# Patient Record
Sex: FEMALE | Race: BLACK OR AFRICAN AMERICAN | NOT HISPANIC OR LATINO | URBAN - METROPOLITAN AREA
[De-identification: names, ages, dates, MRNs, and addresses within clinical notes are randomized per-mention and may not be internally consistent; named-entity substitution may affect disease eponyms.]

---

## 2018-03-06 LAB
BUN SERPL-MCNC: 14 MG/DL (ref 8–27)
BUN/CREAT SERPL: 17 (ref 12–28)
CALCIUM SERPL-MCNC: 10 MG/DL (ref 8.7–10.3)
CHLORIDE SERPL-SCNC: 99 MMOL/L (ref 96–106)
CO2 SERPL-SCNC: 26 MMOL/L (ref 18–29)
CREAT SERPL-MCNC: 0.82 MG/DL (ref 0.57–1)
GFR SERPLBLD CREATININE-BSD FMLA CKD-EPI: 75 ML/MIN/1.73
GFR SERPLBLD CREATININE-BSD FMLA CKD-EPI: 86 ML/MIN/1.73
GLUCOSE SERPL-MCNC: 103 MG/DL (ref 65–99)
HBA1C MFR BLD: 6.8 % (ref 4.8–5.6)
LAB CORP INTERPRETATION:: ABNORMAL
MICROALBUMIN UR-MCNC: <3 UG/ML
POTASSIUM SERPL-SCNC: 4.7 MMOL/L (ref 3.5–5.2)
SODIUM SERPL-SCNC: 141 MMOL/L (ref 134–144)

## 2018-03-09 RX ORDER — LISINOPRIL AND HYDROCHLOROTHIAZIDE 10; 12.5 MG/1; MG/1
1 TABLET ORAL DAILY
COMMUNITY
Start: 2017-06-15 | End: 2018-03-13 | Stop reason: SDUPTHER

## 2018-03-09 RX ORDER — METFORMIN HYDROCHLORIDE 500 MG/1
1 TABLET ORAL 2 TIMES DAILY
COMMUNITY
Start: 2017-06-15 | End: 2018-03-13 | Stop reason: SDUPTHER

## 2018-03-09 RX ORDER — ROSUVASTATIN CALCIUM 20 MG/1
1 TABLET, COATED ORAL DAILY
COMMUNITY
Start: 2017-06-15 | End: 2018-03-13 | Stop reason: SDUPTHER

## 2018-03-09 RX ORDER — OMEPRAZOLE 40 MG/1
1 CAPSULE, DELAYED RELEASE ORAL DAILY
COMMUNITY
Start: 2017-08-07 | End: 2018-03-13 | Stop reason: SDUPTHER

## 2018-03-09 RX ORDER — ASPIRIN 81 MG/1
1 TABLET ORAL DAILY
Status: ON HOLD | COMMUNITY
Start: 2013-02-13 | End: 2024-10-11

## 2018-03-13 ENCOUNTER — OFFICE VISIT (OUTPATIENT)
Dept: FAMILY MEDICINE | Facility: CLINIC | Age: 68
End: 2018-03-13
Attending: FAMILY MEDICINE
Payer: COMMERCIAL

## 2018-03-13 VITALS
SYSTOLIC BLOOD PRESSURE: 126 MMHG | HEIGHT: 61 IN | OXYGEN SATURATION: 97 % | BODY MASS INDEX: 39.04 KG/M2 | DIASTOLIC BLOOD PRESSURE: 72 MMHG | HEART RATE: 73 BPM | WEIGHT: 206.8 LBS | TEMPERATURE: 98.2 F

## 2018-03-13 DIAGNOSIS — E11.9 TYPE 2 DIABETES MELLITUS WITHOUT COMPLICATION, WITHOUT LONG-TERM CURRENT USE OF INSULIN (CMS/HCC): ICD-10-CM

## 2018-03-13 DIAGNOSIS — G89.29 CHRONIC PAIN OF LEFT ANKLE: ICD-10-CM

## 2018-03-13 DIAGNOSIS — I10 BENIGN ESSENTIAL HYPERTENSION: Primary | ICD-10-CM

## 2018-03-13 DIAGNOSIS — E66.9 OBESITY (BMI 35.0-39.9 WITHOUT COMORBIDITY): ICD-10-CM

## 2018-03-13 DIAGNOSIS — Z11.59 ENCOUNTER FOR HEPATITIS C SCREENING TEST FOR LOW RISK PATIENT: ICD-10-CM

## 2018-03-13 DIAGNOSIS — M25.572 CHRONIC PAIN OF LEFT ANKLE: ICD-10-CM

## 2018-03-13 DIAGNOSIS — E78.2 MULTIPLE-TYPE HYPERLIPIDEMIA: ICD-10-CM

## 2018-03-13 PROCEDURE — 99214 OFFICE O/P EST MOD 30 MIN: CPT | Performed by: FAMILY MEDICINE

## 2018-03-13 RX ORDER — OMEPRAZOLE 40 MG/1
40 CAPSULE, DELAYED RELEASE ORAL DAILY
Qty: 90 CAPSULE | Refills: 1 | Status: SHIPPED | OUTPATIENT
Start: 2018-03-13 | End: 2018-05-09 | Stop reason: SDUPTHER

## 2018-03-13 RX ORDER — METFORMIN HYDROCHLORIDE 500 MG/1
500 TABLET ORAL 2 TIMES DAILY
Qty: 180 TABLET | Refills: 1 | Status: SHIPPED | OUTPATIENT
Start: 2018-03-13 | End: 2018-06-11 | Stop reason: HOSPADM

## 2018-03-13 RX ORDER — LISINOPRIL AND HYDROCHLOROTHIAZIDE 10; 12.5 MG/1; MG/1
1 TABLET ORAL DAILY
Qty: 90 TABLET | Refills: 1 | Status: SHIPPED | OUTPATIENT
Start: 2018-03-13 | End: 2018-09-02 | Stop reason: SDUPTHER

## 2018-03-13 RX ORDER — ROSUVASTATIN CALCIUM 20 MG/1
20 TABLET, COATED ORAL DAILY
Qty: 90 TABLET | Refills: 1 | Status: SHIPPED | OUTPATIENT
Start: 2018-03-13 | End: 2020-03-22

## 2018-03-13 ASSESSMENT — ENCOUNTER SYMPTOMS
CHOKING: 0
DIZZINESS: 0
CHEST TIGHTNESS: 0
CHILLS: 0
EYE PAIN: 0
WHEEZING: 0
ACTIVITY CHANGE: 0
SHORTNESS OF BREATH: 0
NUMBNESS: 0
FATIGUE: 0
NERVOUS/ANXIOUS: 0
APPETITE CHANGE: 0
TROUBLE SWALLOWING: 0
ARTHRALGIAS: 0
EYE ITCHING: 0
RHINORRHEA: 0
FEVER: 0
COUGH: 0
EYE DISCHARGE: 0
DIARRHEA: 0
SINUS PAIN: 0
PALPITATIONS: 0
CONSTIPATION: 0
NAUSEA: 0
FREQUENCY: 0
APNEA: 0
BLOOD IN STOOL: 0
ABDOMINAL DISTENTION: 0
BACK PAIN: 0
SORE THROAT: 0
JOINT SWELLING: 1
DIFFICULTY URINATING: 0
VOMITING: 0
HEMATURIA: 0
ABDOMINAL PAIN: 0

## 2018-03-13 NOTE — PATIENT INSTRUCTIONS
Certain factors increase the risk of developing diabetes, including:    Weight. Being overweight is a primary risk factor for type 2 diabetes. The more fatty tissue you have, the more resistant your cells become to insulin.  Fat distribution. If your body stores fat primarily in your abdomen, your risk of type 2 diabetes is greater than if your body stores fat elsewhere, such as your hips and thighs.  Inactivity. The less active you are, the greater your risk of type 2 diabetes. Physical activity helps you control your weight, uses up glucose as energy and makes your cells more sensitive to insulin.  Family history. The risk of type 2 diabetes increases if your parent or sibling has type 2 diabetes.  Race. Although it's unclear why, people of certain races -- including blacks, Hispanics, American Indians and -Americans -- are more likely to develop type 2 diabetes than whites are.  Age. The risk of type 2 diabetes increases as you get older, especially after age 45. That's probably because people tend to exercise less, lose muscle mass and gain weight as they age. But type 2 diabetes is also increasing dramatically among children, adolescents and younger adults.  Prediabetes. Prediabetes is a condition in which your blood sugar level is higher than normal, but not high enough to be classified as diabetes. Left untreated, prediabetes often progresses to type 2 diabetes.  Gestational diabetes. If you developed gestational diabetes when you were pregnant, your risk of later developing type 2 diabetes increases. If you gave birth to a baby weighing more than 9 pounds (4 kilograms), you're also at risk of type 2 diabetes.  Although long-term complications of diabetes develop gradually, they can eventually be disabling or even life-threatening. Some of the potential complications of diabetes include:    Heart and blood vessel disease. Diabetes dramatically increases the risk of various cardiovascular problems,  including coronary artery disease with chest pain (angina), heart attack, stroke, narrowing of arteries (atherosclerosis) and high blood pressure. The risk of stroke is two to four times higher for people with diabetes, and the death rate from heart disease is two to four times higher for people with diabetes than for people without the disease, according to the American Heart Association.  Nerve damage (neuropathy). Excess sugar can injure the walls of the tiny blood vessels (capillaries) that nourish your nerves, especially in the legs. This can cause tingling, numbness, burning or pain that usually begins at the tips of the toes or fingers and gradually spreads upward. Poorly controlled blood sugar can eventually cause you to lose all sense of feeling in the affected limbs. Damage to the nerves that control digestion can cause problems with nausea, vomiting, diarrhea or constipation. For men, erectile dysfunction may be an issue.  Kidney damage (nephropathy). The kidneys contain millions of tiny blood vessel clusters that filter waste from your blood. Diabetes can damage this delicate filtering system. Severe damage can lead to kidney failure or irreversible end-stage kidney disease, requiring dialysis or a kidney transplant.  Eye damage. Diabetes can damage the blood vessels of the retina (diabetic retinopathy), potentially leading to blindness. Diabetes also increases the risk of other serious vision conditions, such as cataracts and glaucoma.  Foot damage. Nerve damage in the feet or poor blood flow to the feet increases the risk of various foot complications. Left untreated, cuts and blisters can become serious infections. Severe damage might require toe, foot or even leg amputation.  Skin and mouth conditions. Diabetes may leave you more susceptible to skin problems, including bacterial and fungal infections. Gum infections also may be a concern, especially if you have a history of poor dental  hygiene.  Osteoporosis. Diabetes may lead to lower than normal bone mineral density, increasing your risk of osteoporosis.  Alzheimer's disease. Type 2 diabetes may increase the risk of Alzheimer's disease and vascular dementia. The poorer your blood sugar control, the greater the risk appears to be. So what connects the two conditions? One theory is that cardiovascular problems caused by diabetes could contribute to dementia by blocking blood flow to the brain or causing strokes. Other possibilities are that too much insulin in the blood leads to brain-damaging inflammation, or lack of insulin in the brain deprives brain cells of glucose.  Hearing problems. Diabetes can also lead to hearing impairment.  Consider these tips:    Commit to managing your diabetes. Learn all you can about type 2 diabetes. Make healthy eating and physical activity part of your daily routine. Establish a relationship with a diabetes educator, and ask your diabetes treatment team for help when you need it.  Identify yourself. Wear a tag or bracelet that says you have diabetes. Keep a glucagon kit nearby in case of a low blood sugar emergency -- and make sure your friends and loved ones know how to use it.  Schedule a yearly physical exam and regular eye exams. Your regular diabetes checkups aren't meant to replace yearly physicals or routine eye exams. During the physical, your doctor will look for any diabetes-related complications, as well as screen for other medical problems. Your eye care specialist will check for signs of retinal damage, cataracts and glaucoma.  Keep your immunizations up to date. High blood sugar can weaken your immune system. Get a flu shot every year, and get a tetanus booster shot every 10 years. Your doctor will likely recommend the pneumonia vaccine, as well. The Centers for Disease Control and Prevention (CDC) also currently recommends hepatitis B vaccination if you haven't previously been vaccinated against  hepatitis B and you're an adult ages 19 to 59 with type 1 or type 2 diabetes. The most recent CDC guidelines advise vaccination as soon as possible after diagnosis with type 1 or type 2 diabetes. If you are age 60 or older, have diabetes and haven't previously received the vaccine, talk to your doctor about whether it's right for you.  Take care of your teeth. Diabetes may leave you prone to gum infections. Brush your teeth at least twice a day, floss your teeth once a day, and schedule dental exams at least twice a year. Consult your dentist right away if your gums bleed or look red or swollen.  Pay attention to your feet. Wash your feet daily in lukewarm water. Dry them gently, especially between the toes, and moisturize with lotion. Check your feet every day for blisters, cuts, sores, redness or swelling. Consult your doctor if you have a sore or other foot problem that isn't healing.  Keep your blood pressure and cholesterol under control. Eating healthy foods and exercising regularly can go a long way toward controlling high blood pressure and cholesterol. Medication may be needed, too.  If you smoke or use other types of tobacco, ask your doctor to help you quit. Smoking increases your risk of various diabetes complications, including heart attack, stroke, nerve damage and kidney disease. Talk to your doctor about ways to stop smoking or to stop using other types of tobacco.  If you drink alcohol, do so responsibly. Alcohol, as well as drink mixers, can cause either high or low blood sugar, depending on how much you drink and if you eat at the same time. If you choose to drink, do so only in moderation and always with a meal. The recommendation for women is no more than one drink daily and no more than two drinks daily for men.  Take stress seriously. If you're stressed, it's easy to abandon your usual diabetes management routine. The hormones your body may produce in response to prolonged stress may prevent  insulin from working properly, which only makes matters worse. To take control, set limits. Prioritize your tasks. Learn relaxation techniques. Get plenty of sleep.    People with a BMI greater than 30 are at higher risk of developing many conditions including hypertension, diabetes, cardiovascular disease, including heart attack and stroke, some cancers, including breast and colon, sleep apnea, arthritis, Alzheimer's disease, and breathing disorders.  Obesity is associated with higher morbidity or disease severity.   With each 5 point reduction in your BMI your risk of sudden death drops by nearly 30 percent.  To maximize success, employ a weight loss program consisting of caloric restriction and regular aerobic exercise. Each alone will result in a caloric loss, but when combined, the caloric burning effect is more dramatic.  A reasonable goal is to lose one pound per week.  Obesity is a chronic disease that will continue for a lifetime. It will not be effectively treated with fad diets, starvation diets, or liquid diets. Do not go on a diet.   As we get older our caloric requirements decrease. Older people don't need as much food as younger people. Our caloric requirements peak at about age 25 to 30 and then decline. A very crude estimate is about 200 calories every decade after thirty.   No fast food:  You can not lose weight eating fast food.  In the September 2007 issue of Men's Health, commentary was made that you need to run the length of a football field 109 times to burn off the calories in the [employer] 10 piece chicken select premium breast strips with spicy buffalo sauce.  No soda, fruit juices, or sweetened beverages: instead drink diet beverages in limited amounts or water. One of the greatest enemies to the obese individual is soda. Even no calorie beverages with artificial sweeteners such as diet soda can increase our cravings for more sweets so be careful. My advice, don't drink your  calories.  Alcoholic drinks are loaded with calories. Most people also tend to ignore the calories their getting from their nightly cocktail. You can ignore them if you want but they're adding up with every sip.  Eat healthier foods such as vegetables, fruit, whole grains, legumes, and nuts.   Portion control: simply put, eat less. Calorie restriction is the most essential ingredient to any weight loss strategy. Try using smaller sized dinner plates. Research has shown that people using smaller plates will consume less food. Visually, the plate appears filled with food and this appeases your desire to eat.  Eat slowly. Those that eat to fast don't allow enough time for the brain to get the 'I'm getting full signal.'   Eat the amount of food you need, not the amount you want. Food for today is only meant to sustain us until tomorrow, nothing more.  Remove yourself from high risk eating situations. For example, if at a party, do not hang around the food table or in the kitchen.  Avoid placing the food serving containers on the dinner table. This reduces the tendency to have seconds.  Meal planning is important as well. Too often, people fail to plan their meals in advance and end up going out or making poor dietary decisions. This is especially important for those who travel and eat out frequently.  Do not grocery shop when hungry. Everything you crave will end up in your cart. Instead, prepare your grocery list, and adhere to it at the store.  Shop the perimeter. The aisles of the grocery store are nothing but calorie trouble.   Pack your lunch instead of eating takeout or dining out. Not only will it be cheaper but, more importantly, healthier.  When dining out visit the restaurant's website prior to your visit and create a health meal. Then stick to it at ordering time. Do not get swayed by a tempting special.  Order a doggie bag with your meal at the time you place your order. When your meal arrives put anything  above and beyond a reasonable portion into the doggie bag for tomorrow’s lunch.  Do not prepare more food than you need. The less food you make, the less you can eat. There are times when you may want to prepare a little extra. For instance, if you pack your lunch you may want to prepare enough for dinner and lunch the following day. Now that is meal planning.   Make your lunch for the next day after dinner. It is always helpful to make your next day's meal when you are not hungry.  Drink water before meals. Having a glass of water prior to each meal will occupy space in your stomach that would otherwise have been filled with food.  Eat a salad with each meal and have the salad dressing put on the side. Salads have a lower caloric density and are healthy. Just go easy with that salad dressing. Salad dressings are loaded with calories - read the labels to see for yourself - and it's possible to get fat eating salads.      Replace desserts with healthy snacks like fruit or low fat yogurt.  Exercise regularly. Exercise burns calories and in combination with calorie restriction results in weight loss. Start with a few short walks throughout the day.     Track your progress by recording your results.  Assign a , whether it be a family member, friend, co-worker, , or a healthcare professional to monitor your progress. Keep in close contact with your  at predetermined intervals so you stay on course. The  should be kept current regarding your progress. Choose someone that will offer positive support and motivate you past time periods when you plateau.  Reward yourself when short term goals are achieved. Just a small token for yourself that will inspire you to reach the next level. Avoid rewarding yourself with food. That could compromise your objective.  Do not get frustrated or give up. Too often people become disgusted with their progress and throw in the towel. Be mentally prepared for the  "times when progress is hard to come by. Prior to beginning, anticipate having periods of time where progress is slowed or stalled.  Then it will not come as such a shock when they occur and you will be able to persevere and continue.   Manage your stress related eating with walking, meditation, yoga or breathing techniques.    Forgive yourself if you slip up. You are only human and are bound to make mistakes. When you do make mistakes, you need to quickly get back on track without beating yourself up. However, do not use the \"I am only human\" excuse to intentionally deviate from your plan. You have to be honest with yourself.  Remember you are in control of your eating habits and no one else.  Go public. Don't secretly try to lose weight concerned about the embarrassment of failure. One, that's self defeating. And two, you can't get any outside help. Instead, announce to everyone you know your intentions to lose weight and ask for their assistance. Get them on your side.   Follow the Rule of the 3 P's: Be positive, persistent, and patient.  Find what motivates you to lose weight. Whether you want to fit into new clothes, look sexier, or how about live a longer healthier life that can be spent doing the things you enjoy, when you want to do them, with the people you love doing them with. Focus on your motivating reasons to lose weight. Write them down and keep them with you.    "

## 2018-03-13 NOTE — PROGRESS NOTES
"Subjective       Patient ID: Fanny Reddy is a 67 y.o. female.    HPI   vision in right eye is not to 100 percent. peripheral vision is intact. No sensitivity to light. Pt notes gets headaches and red eyes if blood pressure is elevated. Have not experienced for 10 years. Recent fall in December 2017. Was seen at [facility] for two ligamentous sprains in left ankles. Denies hitting head. Ankle swells occasionally and is seeing Dr. Chan at [facility] as needed. Pain is controlled in ankle, but still experiencing some pain, tightness, and reduced ROM in Left ankle. Completing exercise at home.    Not compliant with checking blood glucose daily. She is aware that she needs to monitor it daily.     Psx:  No past surgical history on file.  macular pucker on retina in the right eye (November 29, 2017- [facility])    The following have been reviewed and updated as appropriate in this visit:  Allergies  Meds       Review of Systems   Constitutional: Negative for chills, fatigue and fever.   HENT: Negative for congestion, ear discharge, rhinorrhea, sinus pain and sore throat.    Eyes: Negative for pain, discharge and itching.   Respiratory: Negative for apnea, choking, chest tightness and wheezing.    Cardiovascular: Negative for chest pain, palpitations and leg swelling.   Gastrointestinal: Negative for abdominal distention, abdominal pain, blood in stool, constipation, diarrhea, nausea and vomiting.   Genitourinary: Negative for difficulty urinating.   Skin: Negative for rash.   Allergic/Immunologic: Negative for environmental allergies and food allergies.   Neurological: Negative for dizziness and syncope.       Objective     Vitals:    03/13/18 0814   BP: 126/72   Pulse: 73   Temp: 36.8 °C (98.2 °F)   TempSrc: Oral   SpO2: 97%   Weight: 93.8 kg (206 lb 12.8 oz)   Height: 1.549 m (5' 1\")     Body mass index is 39.07 kg/m².    Physical Exam    Assessment/Plan   Problem List Items Addressed " This Visit     Benign essential hypertension - Primary     Stable, cont current regimen         Relevant Medications    lisinopril-hydrochlorothiazide (PRINZIDE,ZESTORETIC) 10-12.5 mg per tablet    Other Relevant Orders    CBC and differential    Comprehensive metabolic panel    Hemoglobin A1c    Lipid panel    Type 2 diabetes mellitus (CMS/HCC) (HCC)    Relevant Medications    metFORMIN (GLUCOPHAGE) 500 mg tablet    Other Relevant Orders    CBC and differential    Comprehensive metabolic panel    Hemoglobin A1c    Lipid panel    Multiple-type hyperlipidemia     Cont statin, repeat lipids prior to next visit         Relevant Medications    rosuvastatin (CRESTOR) 20 mg tablet    Other Relevant Orders    CBC and differential    Comprehensive metabolic panel    Hemoglobin A1c    Lipid panel    Obesity (BMI 35.0-39.9 without comorbidity)     Continue good lifestyle modifications  Pt has successfully lost 6 lbs since last visit.         Relevant Orders    CBC and differential    Comprehensive metabolic panel    Hemoglobin A1c    Lipid panel      Other Visit Diagnoses     Encounter for hepatitis C screening test for low risk patient        Relevant Orders    Hepatitis C Viral RNA, Genotype, LiPA®    Chronic pain of left ankle        managed and followed by dr. weeks - cont physical therapy

## 2018-03-13 NOTE — PROGRESS NOTES
PHI: vision in right eye is not to 100 percent. peripheral vision is intact. No sensitivity to light. Pt notes gets headaches and red eyes if blood pressure is elevated. Have not experienced for 10 years. Recent fall in December 2017. Was seen at [facility] for two ligamentous sprains in left ankles. Denies hitting head. Ankle swells occasionally and is seeing Dr. Chan at [facility] as needed. Pain is controlled in ankle, but still experiencing some pain, tightness, and reduced ROM in Left ankle. Completing exercise at home.    Not compliant with checking blood glucose daily. She is aware that she needs to monitor it daily.     Psx: macular pucker on retina in the right eye (November 29, 2017- [facility])

## 2018-03-13 NOTE — PROGRESS NOTES
"Subjective      Patient ID: Fanny Reddy is a 67 y.o. female.    Cc: chronic conditions  dm2 - stable  htn - stable  hld - stable  Obesity - pt has successfully lost 6 lbs since last visit. Niece does most of the cooking at home and it is more healthy food.    Left ankle - sprain in December. Managed by dr. Chan. Currently doing self physical therapy. Still limping when walking and slowly improving rom      Social History     Social History   • Marital status: Unknown     Spouse name: N/A   • Number of children: N/A   • Years of education: N/A     Occupational History   • Not on file.     Social History Main Topics   • Smoking status: Never Smoker   • Smokeless tobacco: Never Used   • Alcohol use Yes      Comment: socially at celebratory events   • Drug use: No   • Sexual activity: No     Other Topics Concern   • Not on file     Social History Narrative   • No narrative on file         The following have been reviewed and updated as appropriate in this visit:  Allergies  Meds       Review of Systems   Constitutional: Negative for activity change, appetite change, fatigue and fever.   HENT: Negative for congestion, ear pain, postnasal drip, sore throat and trouble swallowing.    Eyes: Negative for pain.   Respiratory: Negative for cough, shortness of breath and wheezing.    Cardiovascular: Negative for chest pain.   Gastrointestinal: Negative for abdominal pain, constipation, diarrhea, nausea and vomiting.   Genitourinary: Negative for frequency and hematuria.   Musculoskeletal: Positive for joint swelling. Negative for arthralgias and back pain.   Skin: Negative for rash.   Neurological: Negative for dizziness and numbness.   Psychiatric/Behavioral: The patient is not nervous/anxious.        Objective     Vitals:    03/13/18 0814   BP: 126/72   Pulse: 73   Temp: 36.8 °C (98.2 °F)   TempSrc: Oral   SpO2: 97%   Weight: 93.8 kg (206 lb 12.8 oz)   Height: 1.549 m (5' 1\")     Body mass index is 39.07 " kg/m².    Physical Exam   Constitutional: She is oriented to person, place, and time. She appears well-developed and well-nourished.   HENT:   Head: Normocephalic and atraumatic.   Eyes: EOM are normal.   Neck: No thyromegaly present.   Cardiovascular: Normal rate, regular rhythm, normal heart sounds and intact distal pulses.    Pulmonary/Chest: Effort normal and breath sounds normal.   Neurological: She is alert and oriented to person, place, and time.   Skin: Skin is warm and dry.   Psychiatric: She has a normal mood and affect. Her behavior is normal.       Assessment/Plan   Problem List Items Addressed This Visit     Benign essential hypertension - Primary     Stable, cont current regimen         Relevant Medications    lisinopril-hydrochlorothiazide (PRINZIDE,ZESTORETIC) 10-12.5 mg per tablet    Other Relevant Orders    CBC and differential    Comprehensive metabolic panel    Hemoglobin A1c    Lipid panel    Type 2 diabetes mellitus (CMS/HCC) (HCC)    Relevant Medications    metFORMIN (GLUCOPHAGE) 500 mg tablet    Other Relevant Orders    CBC and differential    Comprehensive metabolic panel    Hemoglobin A1c    Lipid panel    Multiple-type hyperlipidemia     Cont statin, repeat lipids prior to next visit         Relevant Medications    rosuvastatin (CRESTOR) 20 mg tablet    Other Relevant Orders    CBC and differential    Comprehensive metabolic panel    Hemoglobin A1c    Lipid panel    Obesity (BMI 35.0-39.9 without comorbidity)     Continue good lifestyle modifications  Pt has successfully lost 6 lbs since last visit.         Relevant Orders    CBC and differential    Comprehensive metabolic panel    Hemoglobin A1c    Lipid panel      Other Visit Diagnoses     Encounter for hepatitis C screening test for low risk patient        Relevant Orders    Hepatitis C Viral RNA, Genotype, LiPA®    Chronic pain of left ankle        managed and followed by dr. weeks - cont physical therapy        Up to date on  mammograms, colonoscopy, and gyn. Up to date on all vaccinations including both pna, shingles, flu and tetanus

## 2018-05-09 RX ORDER — OMEPRAZOLE 40 MG/1
CAPSULE, DELAYED RELEASE ORAL
Qty: 90 CAPSULE | Refills: 2 | Status: SHIPPED | OUTPATIENT
Start: 2018-05-09 | End: 2018-05-10 | Stop reason: SDUPTHER

## 2018-05-10 RX ORDER — OMEPRAZOLE 40 MG/1
CAPSULE, DELAYED RELEASE ORAL
Qty: 90 CAPSULE | Refills: 1 | Status: SHIPPED | OUTPATIENT
Start: 2018-05-10 | End: 2019-10-04

## 2018-06-28 RX ORDER — METFORMIN HYDROCHLORIDE 500 MG/1
500 TABLET ORAL 2 TIMES DAILY WITH MEALS
Qty: 180 TABLET | Refills: 1 | Status: SHIPPED | OUTPATIENT
Start: 2018-06-28 | End: 2018-10-18 | Stop reason: SDUPTHER

## 2018-06-28 NOTE — TELEPHONE ENCOUNTER
From: Fanny Reddy  Sent: 6/28/2018 9:54 AM EDT  Subject: Medication Renewal Request    Fanny Reddy would like a refill of the following medications:     Other - Metformin    Preferred pharmacy: [id number] - [place] [id number]  Delivery method: Pickup  Preferred pick-up date and time: 6/28/2018 5:00 PM

## 2018-08-21 ENCOUNTER — TRANSCRIBE ORDERS (OUTPATIENT)
Dept: SCHEDULING | Age: 68
End: 2018-08-21

## 2018-08-21 DIAGNOSIS — Z12.31 ENCOUNTER FOR SCREENING MAMMOGRAM FOR MALIGNANT NEOPLASM OF BREAST: Primary | ICD-10-CM

## 2018-09-04 RX ORDER — LISINOPRIL AND HYDROCHLOROTHIAZIDE 10; 12.5 MG/1; MG/1
TABLET ORAL
Qty: 90 TABLET | Refills: 1 | Status: SHIPPED | OUTPATIENT
Start: 2018-09-04 | End: 2019-03-04 | Stop reason: SDUPTHER

## 2018-09-12 LAB
ALBUMIN SERPL-MCNC: 4.6 G/DL (ref 3.6–4.8)
ALBUMIN/GLOB SERPL: 2.2 {RATIO} (ref 1.2–2.2)
ALP SERPL-CCNC: 75 IU/L (ref 39–117)
ALT SERPL-CCNC: 32 IU/L (ref 0–32)
AST SERPL-CCNC: 23 IU/L (ref 0–40)
BASOPHILS # BLD AUTO: 0 X10E3/UL (ref 0–0.2)
BASOPHILS NFR BLD AUTO: 1 %
BILIRUB SERPL-MCNC: 0.4 MG/DL (ref 0–1.2)
BUN SERPL-MCNC: 14 MG/DL (ref 8–27)
BUN/CREAT SERPL: 16 (ref 12–28)
CALCIUM SERPL-MCNC: 9.8 MG/DL (ref 8.7–10.3)
CHLORIDE SERPL-SCNC: 101 MMOL/L (ref 96–106)
CHOLEST SERPL-MCNC: 220 MG/DL (ref 100–199)
CO2 SERPL-SCNC: 24 MMOL/L (ref 20–29)
CREAT SERPL-MCNC: 0.89 MG/DL (ref 0.57–1)
EOSINOPHIL # BLD AUTO: 0.1 X10E3/UL (ref 0–0.4)
EOSINOPHIL NFR BLD AUTO: 3 %
ERYTHROCYTE [DISTWIDTH] IN BLOOD BY AUTOMATED COUNT: 15.6 % (ref 12.3–15.4)
GLOBULIN SER CALC-MCNC: 2.1 G/DL (ref 1.5–4.5)
GLUCOSE SERPL-MCNC: 124 MG/DL (ref 65–99)
HBA1C MFR BLD: 6.9 % (ref 4.8–5.6)
HCT VFR BLD AUTO: 39.3 % (ref 34–46.6)
HDLC SERPL-MCNC: 59 MG/DL
HGB BLD-MCNC: 12.6 G/DL (ref 11.1–15.9)
IMM GRANULOCYTES # BLD: 0 X10E3/UL (ref 0–0.1)
IMM GRANULOCYTES NFR BLD: 1 %
LAB CORP EGFR IF AFRICN AM: 78 ML/MIN/1.73
LAB CORP EGFR IF NONAFRICN AM: 67 ML/MIN/1.73
LDLC SERPL CALC-MCNC: 139 MG/DL (ref 0–99)
LYMPHOCYTES # BLD AUTO: 1.5 X10E3/UL (ref 0.7–3.1)
LYMPHOCYTES NFR BLD AUTO: 35 %
MCH RBC QN AUTO: 25.6 PG (ref 26.6–33)
MCHC RBC AUTO-ENTMCNC: 32.1 G/DL (ref 31.5–35.7)
MCV RBC AUTO: 80 FL (ref 79–97)
MONOCYTES # BLD AUTO: 0.4 X10E3/UL (ref 0.1–0.9)
MONOCYTES NFR BLD AUTO: 9 %
NEUTROPHILS # BLD AUTO: 2.2 X10E3/UL (ref 1.4–7)
NEUTROPHILS NFR BLD AUTO: 51 %
PLATELET # BLD AUTO: 271 X10E3/UL (ref 150–379)
POTASSIUM SERPL-SCNC: 4.3 MMOL/L (ref 3.5–5.2)
PROT SERPL-MCNC: 6.7 G/DL (ref 6–8.5)
RBC # BLD AUTO: 4.92 X10E6/UL (ref 3.77–5.28)
SODIUM SERPL-SCNC: 140 MMOL/L (ref 134–144)
TRIGL SERPL-MCNC: 108 MG/DL (ref 0–149)
VLDLC SERPL CALC-MCNC: 22 MG/DL (ref 5–40)
WBC # BLD AUTO: 4.3 X10E3/UL (ref 3.4–10.8)

## 2018-09-15 LAB
HCV GENTYP SERPL NAA+PROBE: NORMAL
LAB CORP PLEASE NOTE:: NORMAL

## 2018-09-17 DIAGNOSIS — Z11.59 ENCOUNTER FOR HEPATITIS C SCREENING TEST FOR LOW RISK PATIENT: Primary | ICD-10-CM

## 2018-09-18 ENCOUNTER — OFFICE VISIT (OUTPATIENT)
Dept: FAMILY MEDICINE | Facility: CLINIC | Age: 68
End: 2018-09-18
Payer: COMMERCIAL

## 2018-09-18 VITALS
WEIGHT: 214.2 LBS | SYSTOLIC BLOOD PRESSURE: 126 MMHG | TEMPERATURE: 98.3 F | BODY MASS INDEX: 40.44 KG/M2 | HEART RATE: 73 BPM | DIASTOLIC BLOOD PRESSURE: 74 MMHG | HEIGHT: 61 IN | OXYGEN SATURATION: 97 %

## 2018-09-18 DIAGNOSIS — Z23 NEED FOR VACCINATION: ICD-10-CM

## 2018-09-18 DIAGNOSIS — I10 BENIGN ESSENTIAL HYPERTENSION: ICD-10-CM

## 2018-09-18 DIAGNOSIS — E66.01 OBESITY, CLASS III, BMI 40-49.9 (MORBID OBESITY) (CMS/HCC): ICD-10-CM

## 2018-09-18 DIAGNOSIS — E78.2 MULTIPLE-TYPE HYPERLIPIDEMIA: ICD-10-CM

## 2018-09-18 DIAGNOSIS — E11.9 TYPE 2 DIABETES MELLITUS WITHOUT COMPLICATION, WITHOUT LONG-TERM CURRENT USE OF INSULIN (CMS/HCC): Primary | ICD-10-CM

## 2018-09-18 PROBLEM — E66.813 OBESITY, CLASS III, BMI 40-49.9 (MORBID OBESITY): Status: ACTIVE | Noted: 2018-03-13

## 2018-09-18 LAB
HCV AB S/CO SERPL IA: <0.1 S/CO RATIO (ref 0–0.9)
LAB CORP COMMENT:: NORMAL

## 2018-09-18 PROCEDURE — 99214 OFFICE O/P EST MOD 30 MIN: CPT | Mod: 25 | Performed by: FAMILY MEDICINE

## 2018-09-18 PROCEDURE — 90653 IIV ADJUVANT VACCINE IM: CPT | Performed by: FAMILY MEDICINE

## 2018-09-18 PROCEDURE — 90471 IMMUNIZATION ADMIN: CPT | Performed by: FAMILY MEDICINE

## 2018-09-18 RX ORDER — ROSUVASTATIN CALCIUM 20 MG/1
20 TABLET, COATED ORAL DAILY
COMMUNITY
End: 2018-10-10 | Stop reason: SDUPTHER

## 2018-09-18 RX ORDER — DICLOFENAC SODIUM 10 MG/G
GEL TOPICAL 2 TIMES DAILY PRN
Qty: 100 G | Refills: 1 | Status: SHIPPED | OUTPATIENT
Start: 2018-09-18 | End: 2018-10-12 | Stop reason: SDUPTHER

## 2018-09-18 ASSESSMENT — ENCOUNTER SYMPTOMS
HEMATURIA: 0
BACK PAIN: 0
NUMBNESS: 0
SHORTNESS OF BREATH: 0
CONSTIPATION: 0
ACTIVITY CHANGE: 0
ARTHRALGIAS: 0
FEVER: 0
ABDOMINAL PAIN: 0
NERVOUS/ANXIOUS: 0
APPETITE CHANGE: 0
EYE PAIN: 0
SORE THROAT: 0
DIZZINESS: 0
VOMITING: 0
COUGH: 0
NAUSEA: 0
DIARRHEA: 0
FREQUENCY: 0
FATIGUE: 0
TROUBLE SWALLOWING: 0
WHEEZING: 0

## 2018-09-18 NOTE — PROGRESS NOTES
"Subjective      Patient ID: Fanny Reddy is a 68 y.o. female.    HPI      10/3/18 - has surgery for cataract with Dr. Hernandez  dm2 - no issues  Has seen podiatry this year  Knows she needs to lose weight and is interested in going to nutritional classes at [facility]    The following have been reviewed and updated as appropriate in this visit:       Review of Systems   Constitutional: Negative for activity change, appetite change, fatigue and fever.   HENT: Negative for congestion, ear pain, postnasal drip, sore throat and trouble swallowing.    Eyes: Negative for pain.   Respiratory: Negative for cough, shortness of breath and wheezing.    Cardiovascular: Negative for chest pain.   Gastrointestinal: Negative for abdominal pain, constipation, diarrhea, nausea and vomiting.   Genitourinary: Negative for frequency and hematuria.   Musculoskeletal: Negative for arthralgias and back pain.   Skin: Negative for rash.   Neurological: Negative for dizziness and numbness.   Psychiatric/Behavioral: The patient is not nervous/anxious.        Social History     Social History   • Marital status: Unknown     Spouse name: N/A   • Number of children: N/A   • Years of education: N/A     Occupational History   • Not on file.     Social History Main Topics   • Smoking status: Never Smoker   • Smokeless tobacco: Never Used   • Alcohol use Yes      Comment: socially at celebratory events   • Drug use: No   • Sexual activity: No     Other Topics Concern   • Not on file     Social History Narrative   • No narrative on file       Objective     Vitals:    09/18/18 0829   BP: 126/74   Pulse: 73   Temp: 36.8 °C (98.3 °F)   SpO2: 97%   Weight: 97.2 kg (214 lb 3.2 oz)   Height: 1.549 m (5' 1\")       Physical Exam   Constitutional: She is oriented to person, place, and time. She appears well-developed and well-nourished.   HENT:   Head: Normocephalic and atraumatic.   Neck: Normal range of motion. Neck supple. No thyromegaly present. "   Cardiovascular: Normal rate, regular rhythm, normal heart sounds and intact distal pulses.  Exam reveals no gallop and no friction rub.    No murmur heard.  Pulmonary/Chest: Effort normal and breath sounds normal. No respiratory distress. She has no wheezes. She has no rales.   Neurological: She is alert and oriented to person, place, and time.   Skin: Skin is warm and dry. No rash noted. No erythema.   Psychiatric: She has a normal mood and affect. Her behavior is normal.       Assessment/Plan   Problem List Items Addressed This Visit     Benign essential hypertension     Stable, cont current regimen  Counseled low salt/sodium diet and increased cardiovascular exercise           Type 2 diabetes mellitus (CMS/[facility]) ([facility]) - Primary     Controlled  a1c 6.9%  Cont current regimen  Pt has seen ophtho and podiatry this year  Cont acei and statin         Multiple-type hyperlipidemia     Cont statin         Relevant Medications    rosuvastatin (CRESTOR) 20 mg tablet    Obesity, Class III, BMI 40-49.9 (morbid obesity) (CMS/[facility]) ([facility])     Counseled diet/nutrition and exercise           Other Visit Diagnoses     Need for vaccination        Relevant Orders    Influenza vaccine 65 and older IM preservative free (Completed)          Gyn - Dr. Wade - went sept 2018 - follows annually  mammo scheduled 9/24/18  Flu shot administered today

## 2018-09-18 NOTE — ASSESSMENT & PLAN NOTE
Stable, cont current regimen  Counseled low salt/sodium diet and increased cardiovascular exercise

## 2018-09-18 NOTE — ASSESSMENT & PLAN NOTE
Controlled  a1c 6.9%  Cont current regimen  Pt has seen ophtho and podiatry this year  Cont acei and statin

## 2018-09-24 ENCOUNTER — HOSPITAL ENCOUNTER (OUTPATIENT)
Dept: RADIOLOGY | Facility: HOSPITAL | Age: 68
Discharge: HOME | End: 2018-09-24
Attending: PHYSICIAN ASSISTANT
Payer: COMMERCIAL

## 2018-09-24 DIAGNOSIS — Z12.31 ENCOUNTER FOR SCREENING MAMMOGRAM FOR MALIGNANT NEOPLASM OF BREAST: ICD-10-CM

## 2018-09-24 PROCEDURE — 77063 BREAST TOMOSYNTHESIS BI: CPT

## 2018-10-09 LAB — SPECIMEN STATUS: NORMAL

## 2018-10-10 RX ORDER — ROSUVASTATIN CALCIUM 20 MG/1
20 TABLET, COATED ORAL DAILY
Qty: 90 TABLET | Refills: 1 | Status: SHIPPED | OUTPATIENT
Start: 2018-10-10 | End: 2019-04-02 | Stop reason: SDUPTHER

## 2018-10-12 RX ORDER — DICLOFENAC SODIUM 10 MG/G
GEL TOPICAL
Qty: 100 G | Refills: 1 | Status: SHIPPED | OUTPATIENT
Start: 2018-10-12 | End: 2019-10-04

## 2018-10-15 RX ORDER — DICLOFENAC SODIUM 10 MG/G
GEL TOPICAL
Qty: 100 G | Refills: 1 | Status: SHIPPED | OUTPATIENT
Start: 2018-10-15 | End: 2018-12-11 | Stop reason: SDUPTHER

## 2018-10-19 RX ORDER — METFORMIN HYDROCHLORIDE 500 MG/1
500 TABLET ORAL 2 TIMES DAILY WITH MEALS
Qty: 180 TABLET | Refills: 1 | Status: SHIPPED | OUTPATIENT
Start: 2018-10-19 | End: 2019-02-27 | Stop reason: SDUPTHER

## 2018-10-19 NOTE — TELEPHONE ENCOUNTER
From: Fanny Reddy  Sent: 10/18/2018 10:03 AM EDT  Subject: Medication Renewal Request    Fanny Reddy would like a refill of the following medications:     metFORMIN (GLUCOPHAGE) 500 mg tablet [Lupe Herman DO]    Preferred pharmacy: [facility] [id number] [place] [address]  Delivery method: Pickup

## 2018-11-03 RX ORDER — OMEPRAZOLE 40 MG/1
CAPSULE, DELAYED RELEASE ORAL
Qty: 90 CAPSULE | Refills: 1 | Status: SHIPPED | OUTPATIENT
Start: 2018-11-03 | End: 2019-10-04

## 2018-11-05 RX ORDER — OMEPRAZOLE 40 MG/1
CAPSULE, DELAYED RELEASE ORAL
Qty: 90 CAPSULE | Refills: 1 | Status: SHIPPED | OUTPATIENT
Start: 2018-11-05 | End: 2019-05-27 | Stop reason: SDUPTHER

## 2018-11-20 ENCOUNTER — OFFICE VISIT (OUTPATIENT)
Dept: FAMILY MEDICINE | Facility: CLINIC | Age: 68
End: 2018-11-20
Payer: COMMERCIAL

## 2018-11-20 VITALS
HEIGHT: 61 IN | OXYGEN SATURATION: 98 % | BODY MASS INDEX: 40.7 KG/M2 | TEMPERATURE: 98.6 F | WEIGHT: 215.6 LBS | HEART RATE: 104 BPM

## 2018-11-20 DIAGNOSIS — L71.0 PERIORAL DERMATITIS: Primary | ICD-10-CM

## 2018-11-20 PROCEDURE — 99213 OFFICE O/P EST LOW 20 MIN: CPT | Performed by: FAMILY MEDICINE

## 2018-11-20 RX ORDER — MUPIROCIN 20 MG/G
1 OINTMENT TOPICAL 3 TIMES DAILY
Qty: 22 G | Refills: 1 | Status: SHIPPED | OUTPATIENT
Start: 2018-11-20 | End: 2018-11-27

## 2018-11-20 ASSESSMENT — ENCOUNTER SYMPTOMS
SORE THROAT: 0
CHILLS: 0
SHORTNESS OF BREATH: 0
RHINORRHEA: 0
FATIGUE: 0
SINUS PAIN: 0
FEVER: 0

## 2018-11-20 NOTE — PROGRESS NOTES
"Subjective      Patient ID: Fanny Reddy is a 68 y.o. female.    HPI     4 weeks if skin irritation around lower lip  Has tried neosporin, aquaphor, abreva, lip glosses with no improvement  Skin feels tight  Does admit to licking lips sometimes and previously having dry lips    No fever/chills    The following have been reviewed and updated as appropriate in this visit:       Review of Systems   Constitutional: Negative for chills, fatigue and fever.   HENT: Positive for drooling and mouth sores. Negative for congestion, postnasal drip, rhinorrhea, sinus pain and sore throat.    Respiratory: Negative for shortness of breath.    Cardiovascular: Negative for chest pain.   Skin: Positive for rash.       Social History     Social History   • Marital status: Single     Spouse name: N/A   • Number of children: N/A   • Years of education: N/A     Occupational History   • Not on file.     Social History Main Topics   • Smoking status: Never Smoker   • Smokeless tobacco: Never Used   • Alcohol use Yes      Comment: socially at celebratory events   • Drug use: No   • Sexual activity: No     Other Topics Concern   • Not on file     Social History Narrative   • No narrative on file       Objective     Vitals:    11/20/18 0821   Pulse: (!) 104   Temp: 37 °C (98.6 °F)   SpO2: 98%   Weight: 97.8 kg (215 lb 9.6 oz)   Height: 1.549 m (5' 1\")     Body mass index is 40.74 kg/m².    Physical Exam   Constitutional: She appears well-developed and well-nourished.   HENT:   Head: Normocephalic and atraumatic.   Eyes: Conjunctivae and EOM are normal.   Cardiovascular: Normal rate, regular rhythm and normal heart sounds.  Exam reveals no gallop and no friction rub.    No murmur heard.  Pulmonary/Chest: Effort normal and breath sounds normal. No respiratory distress. She has no wheezes. She has no rales.   Skin:        Psychiatric: She has a normal mood and affect. Her behavior is normal.       Assessment/Plan   Problem List Items " Addressed This Visit     None      Visit Diagnoses     Perioral dermatitis    -  Primary    mupirocin bid apply with qtip and keep lips hydrated with aquaphor, rto if no improvement    Relevant Medications    mupirocin (BACTROBAN) 2 % ointment

## 2018-11-20 NOTE — LETTER
November 20, 2018     Patient: Fanny Reddy   YOB: 1950   Date of Visit: 11/20/2018       To Whom it May Concern:    Fanny Reddy was seen in my clinic on 11/20/2018 at 8:45 am. Please excuse Fanny COTTO for her absence from work on this day to make the appointment.    If you have any questions or concerns, please don't hesitate to call.         Sincerely,         Lupe Herman,         CC: No Recipients

## 2018-11-28 DIAGNOSIS — L30.9 DERMATITIS: Primary | ICD-10-CM

## 2018-12-11 RX ORDER — DICLOFENAC SODIUM 10 MG/G
GEL TOPICAL
Qty: 100 G | Refills: 1 | Status: SHIPPED | OUTPATIENT
Start: 2018-12-11 | End: 2019-02-10 | Stop reason: SDUPTHER

## 2019-01-07 DIAGNOSIS — M54.5 LOW BACK PAIN, UNSPECIFIED BACK PAIN LATERALITY, UNSPECIFIED CHRONICITY, WITH SCIATICA PRESENCE UNSPECIFIED: Primary | ICD-10-CM

## 2019-01-07 NOTE — Clinical Note
Pt had appt this morning and needs referral dated for today, switched insurance and didn't realize needed referral. Thanks!

## 2019-01-14 ENCOUNTER — TRANSCRIBE ORDERS (OUTPATIENT)
Dept: SCHEDULING | Age: 69
End: 2019-01-14

## 2019-01-14 DIAGNOSIS — M54.50 LOW BACK PAIN: Primary | ICD-10-CM

## 2019-01-18 ENCOUNTER — HOSPITAL ENCOUNTER (OUTPATIENT)
Dept: RADIOLOGY | Facility: HOSPITAL | Age: 69
Discharge: HOME | End: 2019-01-18
Attending: ORTHOPAEDIC SURGERY
Payer: COMMERCIAL

## 2019-01-18 DIAGNOSIS — M54.50 LOW BACK PAIN: ICD-10-CM

## 2019-01-18 PROCEDURE — 72100 X-RAY EXAM L-S SPINE 2/3 VWS: CPT

## 2019-01-25 DIAGNOSIS — M54.5 LOW BACK PAIN, UNSPECIFIED BACK PAIN LATERALITY, UNSPECIFIED CHRONICITY, WITH SCIATICA PRESENCE UNSPECIFIED: Primary | ICD-10-CM

## 2019-02-07 ENCOUNTER — OFFICE VISIT (OUTPATIENT)
Dept: FAMILY MEDICINE | Facility: CLINIC | Age: 69
End: 2019-02-07
Payer: COMMERCIAL

## 2019-02-07 DIAGNOSIS — L02.11 CUTANEOUS ABSCESS OF NECK: ICD-10-CM

## 2019-02-07 DIAGNOSIS — L02.91 SOFT TISSUE ABSCESS: Primary | ICD-10-CM

## 2019-02-07 DIAGNOSIS — L02.91 ABSCESS: ICD-10-CM

## 2019-02-07 PROCEDURE — 10060 I&D ABSCESS SIMPLE/SINGLE: CPT | Mod: GC | Performed by: FAMILY MEDICINE

## 2019-02-07 PROCEDURE — 99214 OFFICE O/P EST MOD 30 MIN: CPT | Mod: 25,GC | Performed by: FAMILY MEDICINE

## 2019-02-07 RX ORDER — DOXYCYCLINE HYCLATE 100 MG
100 TABLET ORAL 2 TIMES DAILY
Qty: 10 TABLET | Refills: 0 | Status: SHIPPED | OUTPATIENT
Start: 2019-02-07 | End: 2019-02-12

## 2019-02-07 RX ADMIN — LIDOCAINE HYDROCHLORIDE 3 ML: 10 INJECTION, SOLUTION EPIDURAL; INFILTRATION; INTRACAUDAL; PERINEURAL at 12:00

## 2019-02-07 NOTE — PATIENT INSTRUCTIONS
General Instructions:    · Take Doxycycline 100 mg twice daily for 5 days  · Contact the office if you notice fevers, chills, increasing warmth/redness/tenderness  · Remember to take all of your medications as directed  · Aim for regular aerobic exercise (e.g. Jogging, Cycling, Rowing) for at least 150 minutes per week to help protect your heart  · Eat a healthy diet with focus on vegetables, fruits, and lean meats

## 2019-02-07 NOTE — PROGRESS NOTES
[facility]     CHIEF COMPLAINT   Fanny Reddy is a 68 y.o. female who presents to the office for evaluation of bump on neck.   HISTORY OF PRESENT ILLNESS      History is obtained from the patient as well as her niece who is also present during today's exam.    Patient reports a bump on the back of her neck with onset over the weekend. There is associated pain and erythema. No recent trauma. Denies fevers, chills, discharge, recent antibiotic use. She has tried Neosporin and hot compresses with no relief. Has not tried puncturing it at home. Denies any known allergies to antibiotics. FHx noncontributory.    PAST MEDICAL AND SURGICAL HISTORY      PMHx:  Patient Active Problem List    Diagnosis Date Noted   • Cutaneous abscess of neck 02/07/2019   • Obesity, Class III, BMI 40-49.9 (morbid obesity) (CMS/[facility]) ([facility]) 03/13/2018   • Benign essential hypertension 01/19/2011   • Type 2 diabetes mellitus (CMS/[facility]) ([facility]) 01/19/2011   • Multiple-type hyperlipidemia 01/19/2011   • Vitamin D deficiency 01/19/2011     PSHx:  No past surgical history on file.  MEDICATIONS        Current Outpatient Prescriptions:   •  aspirin (ASPIR-LOW) 81 mg enteric coated tablet, Take 1 tablet by mouth daily., Disp: , Rfl:   •  diclofenac sodium (VOLTAREN) 1 % topical gel, APPLY TOPICALLY TWO TIMES A DAY AS NEEDED FOR MILD PAIN, Disp: 100 g, Rfl: 1  •  diclofenac sodium (VOLTAREN) 1 % topical gel, APPLY TO AFFECTED AREA(S) TWO TIMES A DAY AS NEEDED MILD PAIN, Disp: 100 g, Rfl: 1  •  doxycycline hyclate (VIBRA-TABS) 100 mg tablet, Take 1 tablet (100 mg total) by mouth 2 (two) times a day for 5 days., Disp: 10 tablet, Rfl: 0  •  lisinopril-hydrochlorothiazide (PRINZIDE,ZESTORETIC) 10-12.5 mg per tablet, TAKE ONE TABLET BY MOUTH EVERY DAY, Disp: 90 tablet, Rfl: 1  •  metFORMIN (GLUCOPHAGE) 500 mg tablet, Take 1 tablet (500 mg total) by mouth 2 (two) times a day with meals., Disp: 180 tablet, Rfl: 1  •  omeprazole  "(PriLOSEC) 40 mg capsule, TAKE ONE CAPSULE BY MOUTH EVERY DAY, Disp: 90 capsule, Rfl: 1  •  omeprazole (PriLOSEC) 40 mg capsule, TAKE ONE CAPSULE BY MOUTH EVERY DAY, Disp: 90 capsule, Rfl: 1  •  rosuvastatin (CRESTOR) 20 mg tablet, Take 1 tablet (20 mg total) by mouth daily., Disp: 90 tablet, Rfl: 1  ALLERGIES      Patient has no known allergies.  FAMILY HISTORY      No family history on file.  SOCIAL HISTORY      Social History     Social History   • Marital status: Single     Spouse name: N/A   • Number of children: N/A   • Years of education: N/A     Social History Main Topics   • Smoking status: Never Smoker   • Smokeless tobacco: Never Used   • Alcohol use Yes      Comment: socially at celebratory events   • Drug use: No   • Sexual activity: No     Other Topics Concern   • Not on file     Social History Narrative   • No narrative on file     REVIEW OF SYSTEMS      .Review of Systems   Constitutional: Negative for chills and fever.   Respiratory: Negative for shortness of breath.    Cardiovascular: Negative for chest pain.   Skin: Positive for color change.        Erythematous and painful lump at back of neck   Hematological: Negative for adenopathy.     PHYSICAL EXAMINATION      /72   Pulse 83   Temp 37.3 °C (99.2 °F)   Ht 1.562 m (5' 1.5\")   Wt 100 kg (220 lb 9.6 oz)   SpO2 98%   BMI 41.01 kg/m²   Body mass index is 41.01 kg/m².    Physical Exam   Constitutional: She appears well-developed and well-nourished. She is cooperative. No distress.   Cardiovascular: Normal rate, regular rhythm, S1 normal and S2 normal.  Exam reveals no gallop and no friction rub.    No murmur heard.  Pulmonary/Chest: Effort normal and breath sounds normal. No accessory muscle usage. No respiratory distress. She has no wheezes. She has no rhonchi. She has no rales.   Lymphadenopathy:     She has no cervical adenopathy.   Skin:   Single 3 cm nodule that is erythematous with a central opening located posterolaterally on the " R neck. There is fluctuance and tenderness to palpation. No erythema of surrounding skin.      LABS / IMAGING / STUDIES      Lab Results   Component Value Date    CREATININE 0.89 09/11/2018     Lab Results   Component Value Date    WBC 4.3 09/11/2018    HGB 12.6 09/11/2018    HCT 39.3 09/11/2018    MCV 80 09/11/2018     09/11/2018     Lab Results   Component Value Date    CHOL 220 (H) 09/11/2018    TRIG 108 09/11/2018    HDL 59 09/11/2018     Lab Results   Component Value Date    HGBA1C 6.9 (H) 09/11/2018     Lab Results   Component Value Date    MICROALBUR <3.0 03/05/2018         ASSESSMENT AND PLAN   Problem List Items Addressed This Visit     Cutaneous abscess of neck     Failed topical neosporin and warm compresses. No signs of systemic infection. I&D performed in office with purulent material drained. Wound left open without packing. Refer to procedure note for details. Doxycycline 100 mg BID x5 days prescribed. Patient was advised to contact the office if she develops increasing erythema, warmth, pain of site or fevers/chills.         Relevant Orders    Incision and Drainage      Other Visit Diagnoses     Soft tissue abscess    -  Primary    Relevant Medications    lidocaine PF (XYLOCAINE) 10 mg/mL (1 %) injection 3 mL (Completed) (Start on 2/8/2019 10:15 PM)    Other Relevant Orders    Incision and Drainage    Abscess               Kvng Kam,   2/7/2019

## 2019-02-08 VITALS
HEART RATE: 83 BPM | HEIGHT: 62 IN | TEMPERATURE: 99.2 F | BODY MASS INDEX: 40.59 KG/M2 | OXYGEN SATURATION: 98 % | SYSTOLIC BLOOD PRESSURE: 120 MMHG | WEIGHT: 220.6 LBS | DIASTOLIC BLOOD PRESSURE: 72 MMHG

## 2019-02-08 RX ORDER — LIDOCAINE HYDROCHLORIDE 10 MG/ML
3 INJECTION, SOLUTION EPIDURAL; INFILTRATION; INTRACAUDAL; PERINEURAL ONCE
Status: COMPLETED | OUTPATIENT
Start: 2019-02-08 | End: 2019-02-07

## 2019-02-08 ASSESSMENT — ENCOUNTER SYMPTOMS
ADENOPATHY: 0
FEVER: 0
SHORTNESS OF BREATH: 0
COLOR CHANGE: 1
CHILLS: 0

## 2019-02-09 NOTE — ASSESSMENT & PLAN NOTE
Failed topical neosporin and warm compresses. No signs of systemic infection. I&D performed in office with purulent material drained. Wound left open without packing. Refer to procedure note for details. Doxycycline 100 mg BID x5 days prescribed. Patient was advised to contact the office if she develops increasing erythema, warmth, pain of site or fevers/chills.

## 2019-02-09 NOTE — PROCEDURES
Incision and drainage  Date/Time: 2/8/2019 9:13 PM  Performed by: KENZIE AVILES  Authorized by: KENZIE AVILES     Consent:     Consent obtained:  Verbal    Consent given by:  Patient    Risks discussed:  Bleeding, incomplete drainage, pain and infection    Alternatives discussed:  No treatment and alternative treatment  Location:     Type:  Abscess    Size:  3 mm    Location:  Neck  Pre-procedure details:     Skin preparation:  Antiseptic wash  Anesthesia (see MAR for exact dosages):     Anesthesia method:  Local infiltration    Local anesthetic:  Lidocaine 1% w/o epi  Procedure type:     Complexity:  Simple  Procedure details:     Needle aspiration: no      Incision types:  Single straight    Incision depth:  Subcutaneous    Scalpel blade:  11    Wound management:  Probed and deloculated and irrigated with saline    Drainage:  Purulent and bloody    Drainage amount:  Scant    Wound treatment:  Wound left open    Packing materials:  None  Post-procedure details:     Patient tolerance of procedure:  Tolerated well, no immediate complications

## 2019-02-11 RX ORDER — DICLOFENAC SODIUM 10 MG/G
GEL TOPICAL
Qty: 100 G | Refills: 1 | Status: SHIPPED | OUTPATIENT
Start: 2019-02-11 | End: 2019-04-11 | Stop reason: SDUPTHER

## 2019-02-13 NOTE — PROGRESS NOTES
[facility]     CHIEF COMPLAINT   Fanny Reddy is a 68 y.o. female who presents to the office for evaluation of abscess f/u     HISTORY OF PRESENT ILLNESS      2/7/19: I&D with Doxycycline 100 mg BID x5 days    She just completed her 5 day antibiotic course yesterday. Tolerated treatment well. No fevers, chills. Original site of abscess no longer bothersome to the patient. She does have some residual scar tissue forming a nodule at the site.   PAST MEDICAL AND SURGICAL HISTORY      PMHx:  Patient Active Problem List    Diagnosis Date Noted   • Cutaneous abscess of neck 02/07/2019   • Obesity, Class III, BMI 40-49.9 (morbid obesity) (CMS/[facility]) ([facility]) 03/13/2018   • Benign essential hypertension 01/19/2011   • Type 2 diabetes mellitus (CMS/[facility]) ([facility]) 01/19/2011   • Multiple-type hyperlipidemia 01/19/2011   • Vitamin D deficiency 01/19/2011     PSHx:  No past surgical history on file.  MEDICATIONS        Current Outpatient Prescriptions:   •  aspirin (ASPIR-LOW) 81 mg enteric coated tablet, Take 1 tablet by mouth daily., Disp: , Rfl:   •  diclofenac sodium (VOLTAREN) 1 % topical gel, APPLY TOPICALLY TWO TIMES A DAY AS NEEDED FOR MILD PAIN, Disp: 100 g, Rfl: 1  •  diclofenac sodium (VOLTAREN) 1 % topical gel, APPLY TO AFFECTED AREA(S) TWO TIMES A DAY AS NEEDED FOR MILD PAIN, Disp: 100 g, Rfl: 1  •  lisinopril-hydrochlorothiazide (PRINZIDE,ZESTORETIC) 10-12.5 mg per tablet, TAKE ONE TABLET BY MOUTH EVERY DAY, Disp: 90 tablet, Rfl: 1  •  metFORMIN (GLUCOPHAGE) 500 mg tablet, Take 1 tablet (500 mg total) by mouth 2 (two) times a day with meals., Disp: 180 tablet, Rfl: 1  •  omeprazole (PriLOSEC) 40 mg capsule, TAKE ONE CAPSULE BY MOUTH EVERY DAY, Disp: 90 capsule, Rfl: 1  •  omeprazole (PriLOSEC) 40 mg capsule, TAKE ONE CAPSULE BY MOUTH EVERY DAY, Disp: 90 capsule, Rfl: 1  •  rosuvastatin (CRESTOR) 20 mg tablet, Take 1 tablet (20 mg total) by mouth daily., Disp: 90 tablet, Rfl: 1  ALLERGIES     "  Patient has no known allergies.  FAMILY HISTORY      No family history on file.  SOCIAL HISTORY      Social History     Social History   • Marital status: Single     Spouse name: N/A   • Number of children: N/A   • Years of education: N/A     Social History Main Topics   • Smoking status: Never Smoker   • Smokeless tobacco: Never Used   • Alcohol use Yes      Comment: socially at celebratory events   • Drug use: No   • Sexual activity: No     Other Topics Concern   • Not on file     Social History Narrative   • No narrative on file     REVIEW OF SYSTEMS      .Review of Systems   Constitutional: Negative for chills and fever.   Respiratory: Negative for shortness of breath.    Cardiovascular: Negative for chest pain.   Gastrointestinal: Negative for abdominal pain and diarrhea.   Skin: Negative for wound.     PHYSICAL EXAMINATION      /80   Pulse 67   Temp 36.7 °C (98 °F)   Ht 1.562 m (5' 1.5\")   Wt 100 kg (220 lb 6.4 oz)   SpO2 96%   BMI 40.97 kg/m²   Body mass index is 40.97 kg/m².    Physical Exam   Constitutional: She appears well-developed and well-nourished. She is cooperative. No distress.   Cardiovascular: Normal rate, regular rhythm, S1 normal and S2 normal.  Exam reveals no gallop and no friction rub.    No murmur heard.  Pulmonary/Chest: Effort normal and breath sounds normal. No accessory muscle usage. No respiratory distress. She has no wheezes. She has no rhonchi. She has no rales.   Skin:          LABS / IMAGING / STUDIES      Lab Results   Component Value Date    CREATININE 0.89 09/11/2018     Lab Results   Component Value Date    WBC 4.3 09/11/2018    HGB 12.6 09/11/2018    HCT 39.3 09/11/2018    MCV 80 09/11/2018     09/11/2018     Lab Results   Component Value Date    CHOL 220 (H) 09/11/2018    TRIG 108 09/11/2018    HDL 59 09/11/2018     Lab Results   Component Value Date    HGBA1C 6.9 (H) 09/11/2018     Lab Results   Component Value Date    MICROALBUR <3.0 03/05/2018       "   ASSESSMENT AND PLAN   Problem List Items Addressed This Visit     Benign essential hypertension    Relevant Orders    Ambulatory referral to Diabetic Education    Type 2 diabetes mellitus (CMS/[facility]) ([facility])    Relevant Orders    Ambulatory referral to Diabetic Education    Obesity, Class III, BMI 40-49.9 (morbid obesity) (CMS/[facility]) ([facility])    Relevant Orders    Ambulatory referral to Diabetic Education    Cutaneous abscess of neck - Primary     Resolved s/p office I&D and 5 day course of Doxycycline.                Kvng Kam DO  2/14/2019

## 2019-02-14 ENCOUNTER — OFFICE VISIT (OUTPATIENT)
Dept: FAMILY MEDICINE | Facility: CLINIC | Age: 69
End: 2019-02-14
Payer: COMMERCIAL

## 2019-02-14 VITALS
SYSTOLIC BLOOD PRESSURE: 136 MMHG | HEART RATE: 67 BPM | WEIGHT: 220.4 LBS | TEMPERATURE: 98 F | HEIGHT: 62 IN | OXYGEN SATURATION: 96 % | BODY MASS INDEX: 40.56 KG/M2 | DIASTOLIC BLOOD PRESSURE: 80 MMHG

## 2019-02-14 DIAGNOSIS — E11.9 TYPE 2 DIABETES MELLITUS WITHOUT COMPLICATION, WITHOUT LONG-TERM CURRENT USE OF INSULIN (CMS/HCC): ICD-10-CM

## 2019-02-14 DIAGNOSIS — L02.11 CUTANEOUS ABSCESS OF NECK: Primary | ICD-10-CM

## 2019-02-14 DIAGNOSIS — E66.01 OBESITY, CLASS III, BMI 40-49.9 (MORBID OBESITY) (CMS/HCC): ICD-10-CM

## 2019-02-14 DIAGNOSIS — I10 BENIGN ESSENTIAL HYPERTENSION: ICD-10-CM

## 2019-02-14 PROCEDURE — 99213 OFFICE O/P EST LOW 20 MIN: CPT | Mod: GC,24 | Performed by: FAMILY MEDICINE

## 2019-02-14 NOTE — PATIENT INSTRUCTIONS
General Instructions:    · Remember to take all of your medications as directed  · Aim for regular aerobic exercise (e.g. Jogging, Cycling, Rowing) for at least 150 minutes per week to help protect your heart  · Eat a healthy diet with focus on vegetables, fruits, and lean meats

## 2019-02-15 ASSESSMENT — ENCOUNTER SYMPTOMS
CHILLS: 0
ABDOMINAL PAIN: 0
DIARRHEA: 0
FEVER: 0
SHORTNESS OF BREATH: 0
WOUND: 0

## 2019-02-26 ENCOUNTER — TELEPHONE (OUTPATIENT)
Dept: FAMILY MEDICINE | Facility: CLINIC | Age: 69
End: 2019-02-26

## 2019-02-27 ENCOUNTER — TELEPHONE (OUTPATIENT)
Dept: FAMILY MEDICINE | Facility: CLINIC | Age: 69
End: 2019-02-27

## 2019-02-27 RX ORDER — METFORMIN HYDROCHLORIDE 500 MG/1
500 TABLET ORAL 2 TIMES DAILY WITH MEALS
Qty: 180 TABLET | Refills: 1 | Status: SHIPPED | OUTPATIENT
Start: 2019-02-27 | End: 2019-02-28 | Stop reason: SDUPTHER

## 2019-02-27 NOTE — TELEPHONE ENCOUNTER
From: Fanny Reddy  Sent: 2/27/2019 4:45 PM EST  Subject: Medication Renewal Request    Fanny Reddy would like a refill of the following medications:     metFORMIN (GLUCOPHAGE) 500 mg tablet [Lupe Herman DO]    Preferred pharmacy: [facility] [id number] [place] [address]  Delivery method: Pickup  Preferred pick-up date and time: 2/28/2019 4:30 PM

## 2019-02-28 RX ORDER — METFORMIN HYDROCHLORIDE 500 MG/1
500 TABLET ORAL 2 TIMES DAILY WITH MEALS
Qty: 180 TABLET | Refills: 1 | Status: SHIPPED | OUTPATIENT
Start: 2019-02-28 | End: 2019-05-27 | Stop reason: SDUPTHER

## 2019-03-04 RX ORDER — LISINOPRIL AND HYDROCHLOROTHIAZIDE 10; 12.5 MG/1; MG/1
1 TABLET ORAL
Qty: 90 TABLET | Refills: 1 | Status: SHIPPED | OUTPATIENT
Start: 2019-03-04 | End: 2019-04-30 | Stop reason: SDUPTHER

## 2019-03-04 NOTE — TELEPHONE ENCOUNTER
Pharmacy called requesting a Rx refill for the following to be sent to pharmacy on file      Thanks!

## 2019-03-18 DIAGNOSIS — E78.5 HYPERLIPIDEMIA, UNSPECIFIED HYPERLIPIDEMIA TYPE: ICD-10-CM

## 2019-03-18 DIAGNOSIS — I10 ESSENTIAL HYPERTENSION, BENIGN: Primary | ICD-10-CM

## 2019-03-25 ENCOUNTER — OFFICE VISIT (OUTPATIENT)
Dept: FAMILY MEDICINE | Facility: CLINIC | Age: 69
End: 2019-03-25
Payer: COMMERCIAL

## 2019-03-25 VITALS
WEIGHT: 218 LBS | HEART RATE: 83 BPM | TEMPERATURE: 98.6 F | SYSTOLIC BLOOD PRESSURE: 126 MMHG | BODY MASS INDEX: 40.52 KG/M2 | OXYGEN SATURATION: 91 % | DIASTOLIC BLOOD PRESSURE: 80 MMHG

## 2019-03-25 DIAGNOSIS — E11.9 TYPE 2 DIABETES MELLITUS WITHOUT COMPLICATION, WITHOUT LONG-TERM CURRENT USE OF INSULIN (CMS/HCC): Primary | ICD-10-CM

## 2019-03-25 DIAGNOSIS — Z12.31 SCREENING MAMMOGRAM, ENCOUNTER FOR: ICD-10-CM

## 2019-03-25 DIAGNOSIS — E78.2 MULTIPLE-TYPE HYPERLIPIDEMIA: ICD-10-CM

## 2019-03-25 DIAGNOSIS — Z13.820 SCREENING FOR OSTEOPOROSIS: ICD-10-CM

## 2019-03-25 DIAGNOSIS — I10 BENIGN ESSENTIAL HYPERTENSION: ICD-10-CM

## 2019-03-25 DIAGNOSIS — E66.01 OBESITY, CLASS III, BMI 40-49.9 (MORBID OBESITY) (CMS/HCC): ICD-10-CM

## 2019-03-25 PROCEDURE — G0402 INITIAL PREVENTIVE EXAM: HCPCS | Performed by: FAMILY MEDICINE

## 2019-03-25 RX ORDER — VALACYCLOVIR HYDROCHLORIDE 500 MG/1
500 TABLET, FILM COATED ORAL DAILY
Qty: 90 TABLET | Refills: 1 | Status: SHIPPED | OUTPATIENT
Start: 2019-03-25 | End: 2019-06-04 | Stop reason: SDUPTHER

## 2019-03-25 ASSESSMENT — VISUAL ACUITY
OS_CC: 20/30
OD_CC: 20/40

## 2019-03-25 ASSESSMENT — MINI COG
TOTAL SCORE: 5
COMPLETED: YES

## 2019-03-25 NOTE — PROGRESS NOTES
Subjective     Fanny Reddy is a 69 y.o. female who presents for a Welcome to Medicare exam.     Comprehensive Medical and Social History  Patient Active Problem List   Diagnosis   • Benign essential hypertension   • Type 2 diabetes mellitus (CMS/[facility]) ([facility])   • Multiple-type hyperlipidemia   • Vitamin D deficiency   • Obesity, Class III, BMI 40-49.9 (morbid obesity) (CMS/[facility]) ([facility])   • Cutaneous abscess of neck     No past medical history on file.  No past surgical history on file.  No Known Allergies  Current Outpatient Prescriptions   Medication Sig Dispense Refill   • aspirin (ASPIR-LOW) 81 mg enteric coated tablet Take 1 tablet by mouth daily.     • diclofenac sodium (VOLTAREN) 1 % topical gel APPLY TOPICALLY TWO TIMES A DAY AS NEEDED FOR MILD PAIN 100 g 1   • diclofenac sodium (VOLTAREN) 1 % topical gel APPLY TO AFFECTED AREA(S) TWO TIMES A DAY AS NEEDED FOR MILD PAIN 100 g 1   • lisinopril-hydrochlorothiazide (PRINZIDE,ZESTORETIC) 10-12.5 mg per tablet Take 1 tablet by mouth once daily. 90 tablet 1   • metFORMIN (GLUCOPHAGE) 500 mg tablet Take 1 tablet (500 mg total) by mouth 2 (two) times a day with meals. 180 tablet 1   • omeprazole (PriLOSEC) 40 mg capsule TAKE ONE CAPSULE BY MOUTH EVERY DAY 90 capsule 1   • omeprazole (PriLOSEC) 40 mg capsule TAKE ONE CAPSULE BY MOUTH EVERY DAY 90 capsule 1   • rosuvastatin (CRESTOR) 20 mg tablet Take 1 tablet (20 mg total) by mouth daily. 90 tablet 1   • valACYclovir (VALTREX) 500 mg tablet Take 1 tablet (500 mg total) by mouth daily. 90 tablet 1     No current facility-administered medications for this visit.      Social History     Social History   • Marital status: Single     Spouse name: N/A   • Number of children: N/A   • Years of education: N/A     Social History Main Topics   • Smoking status: Never Smoker   • Smokeless tobacco: Never Used   • Alcohol use Yes      Comment: socially at celebratory events   • Drug use: No   • Sexual activity: No     Other  Topics Concern   • Not on file     Social History Narrative   • No narrative on file       Objective   Vitals  Vitals:    03/25/19 0921   BP: 126/80   Pulse: 83   Temp: 37 °C (98.6 °F)   SpO2: (!) 91%   Weight: 98.9 kg (218 lb)     Body mass index is 40.52 kg/m².    Advanced Care Plan  Does patient have advance directive?: No                                     PHQ  Over the Past 2 Weeks, Have You Felt Down, Depressed or Hopeless?: no  Over the Past 2 Weeks, Have You Felt Little Interest or Pleasure In Doing Things?: no                                          Mini Cog  Completed: Yes  Score: 5  Result: Negative    Get Up and Go  Result: Pass            STEADI Falls Risk  One or more falls in the last year: No           Has trouble stepping up onto a curb: No   Advised to use a cane or walker to get around safely: No   Often has to rush to the toilet: No   Feels unsteady when walking: No   Has lost some feeling in feet: No   Often feels sad or depressed: No   Steadies self on furniture while walking at home: No   Takes medication that makes him/her feel lightheaded or more tired than usual: No   Worried about falling: No   Takes medicine to sleep or improve mood: No   Needs to push with hands when rising from a chair: No   Falls screen completed: No       Hearing and Vision Screening   Visual Acuity Screening    Right eye Left eye Both eyes   Without correction:      With correction: 20/40 20/30          Assessment/Plan   Problem List Items Addressed This Visit     Benign essential hypertension     Stable, cont current regimen  Counseled low salt/sodium diet and increased cardiovascular exercise           Relevant Orders    Ambulatory referral to Nutrition Services    CBC and Differential    Comprehensive metabolic panel    Hemoglobin A1c    Microalbumin / creatinine urine ratio    Lipid panel    Type 2 diabetes mellitus (CMS/HCC) ([facility]) - Primary     Controlled fall 2018 6.9%  Cont current regimen  Pt to see ophtho  fall 2019  Podiatry appt oct 2019  Cont acei and statin         Relevant Orders    Ambulatory referral to Nutrition Services    CBC and Differential    Comprehensive metabolic panel    Hemoglobin A1c    Microalbumin / creatinine urine ratio    Lipid panel    Multiple-type hyperlipidemia     Cont statin  F/u lipid profile         Relevant Orders    Ambulatory referral to Nutrition Services    CBC and Differential    Comprehensive metabolic panel    Hemoglobin A1c    Microalbumin / creatinine urine ratio    Lipid panel    Obesity, Class III, BMI 40-49.9 (morbid obesity) (CMS/[facility]) ([facility])     Counseled diet/nutrition and exercise  DM education/nutrition provided          Relevant Orders    Ambulatory referral to Nutrition Services    CBC and Differential    Comprehensive metabolic panel    Hemoglobin A1c    Microalbumin / creatinine urine ratio    Lipid panel      Other Visit Diagnoses     Screening mammogram, encounter for        Relevant Orders    BI SCREENING MAMMOGRAM BILATERAL    Screening for osteoporosis        Relevant Orders    DEXA BONE DENSITY          See Patient Instructions (the written plan) which was given to the patient for PPPS and health risk factors with interventions.

## 2019-03-25 NOTE — PATIENT INSTRUCTIONS
Women's Personalized Prevention Plan Services (PPPS)      Preventive Services Checklist (Assumes Average Risk Unless Otherwise Noted)  Preventive Service Target Population and Frequency Last Done Date Due   Abd Aortic Aneurysm Screening Family history of AAA (covered once) n/a n/a   Alcohol Misuse Screening As necessary for those at risk (covered annually) n/a    Breast Cancer Screening Mammogram every 1-2yrs 50-71yo; every 1-2yrs 35-48yo if patient desires after weighing potential harms and benefits (covered once 35-40yo, annually >=41yo) 9/24/18 Sept 2019   Cholesterol Screening Both risk factors: 1) >=21yo and 2)  increased risk coronary artery disease (covered every 5 years) Fall 2018 Due - ordered 3/25/19   Colorectal Cancer Screening >=49yo: Colonoscopy every 10 years, FIT annually or Cologuard every 3 years (up to 84yo for Cologuard) 10/29/17 10 years   Diabetes Screening Any 1 risk factor: hypertension, dyslipidemia, obesity, high glucose; or Any 2 risk factors: >=66 yo, overweight, family history diabetes, history gestational diabetes or delivery of infant >9lbs (covered every 6 months) Fall 2018 Due - ordered 3/25/19   Glaucoma Screening Any 1 risk factor: 1) diabetes, 2) family history glaucoma, 3)  >=49yo, 4)  American >=64yo (covered annually) Fall 2018 Fall 2019   Hepatitis C Screening Any 1 risk factor: 1) born between 9518-2314, 2) blood transfusion before 1992, 3) current or past injection drug use (covered once for average risk, annually for high risk) Completed     Lung Cancer Screening Low-dose chest CT if all 3 risk factors: 1) 55-78yo, 2) smoker or quit within last 15y, 3) >=30 pack years (covered annually) n/a    Osteoporosis Screening >=64yo (covered every 24 months)  <64yo if any 1 risk factor: 1) estrogen deficient and at risk for osteoporosis; 2) established osteoporosis on treatment; 3) x-rays show possible osteoporosis, osteopenia or  "vertebral fractures; 4) on steroid or planning to start; 5) primary hyperparathyroidism (covered as medically necessary) 2015 Due with mammo fall 2019   Sexually Transmitted Diseases (STDs) As necessary chlamydia, gonorrhea, syphilis, hepatitis B (covered annually if not pregnant, more often in pregnancy)  HIV if any 1 risk factor present: 1) <14yo or >66yo and at increased risk, 2) 15-66yo and ask for it, or 3) pregnant (covered annually if not pregnant, up to 3x in pregnancy) n/a n/a   Vaccine: Hepatitis B As necessary if medium or high risk (series covered once)     Vaccine: Influenza All patients without contraindications (covered annually.) Fall 2018 Fall 2019   Vaccine: Pneumococcal Pneumovax + Prevnar (both covered, >=11 months apart) Completed     Vaccine: Shingrix (Shingles) >= 51yo without contraindications             (2 doses, 2 months apart) To check with pharmacy    Other Services Tdap - check with pharmacy              Women's Personalized Prevention Plan Services (PPPS)                                           Health Risk Factors and Interventions  \"x\" Indicates risk is associated with this patient Risk Factor Recommended Interventions     Obesity     Hypertension     Diabetes     Fall Risk     Tobacco Use     Other:          "

## 2019-03-25 NOTE — ASSESSMENT & PLAN NOTE
Controlled fall 2018 6.9%  Cont current regimen  [facility]  [facility] app[facility] oct [facility]  Cont acei and statin

## 2019-04-02 RX ORDER — ROSUVASTATIN CALCIUM 20 MG/1
TABLET, COATED ORAL
Qty: 90 TABLET | Refills: 1 | Status: SHIPPED | OUTPATIENT
Start: 2019-04-02 | End: 2019-09-27 | Stop reason: SDUPTHER

## 2019-04-03 LAB — AMB MLHC EXTERNAL DIABETIC EYE EXAMS: NEGATIVE

## 2019-04-11 RX ORDER — DICLOFENAC SODIUM 10 MG/G
GEL TOPICAL
Qty: 100 G | Refills: 1 | Status: SHIPPED | OUTPATIENT
Start: 2019-04-11 | End: 2019-06-07 | Stop reason: SDUPTHER

## 2019-05-01 RX ORDER — LISINOPRIL AND HYDROCHLOROTHIAZIDE 10; 12.5 MG/1; MG/1
1 TABLET ORAL
Qty: 90 TABLET | Refills: 1 | Status: SHIPPED | OUTPATIENT
Start: 2019-05-01 | End: 2019-11-02 | Stop reason: SDUPTHER

## 2019-05-01 NOTE — TELEPHONE ENCOUNTER
From: Fanny Reddy  Sent: 4/30/2019 5:25 PM EDT  Subject: Medication Renewal Request    Fanny Reddy would like a refill of the following medications:     lisinopril-hydrochlorothiazide (PRINZIDE,ZESTORETIC) 10-12.5 mg per tablet [Lupe Herman, DO]   Patient Comment: Is it possible to get a 90 day supply?    Preferred pharmacy: [facility] [id number] [place] [id number]  Delivery method: Pickup  Preferred pick-up date and time: 5/3/2019 12:30 PM

## 2019-05-28 RX ORDER — OMEPRAZOLE 40 MG/1
40 CAPSULE, DELAYED RELEASE ORAL
Qty: 90 CAPSULE | Refills: 1 | Status: SHIPPED | OUTPATIENT
Start: 2019-05-28 | End: 2019-11-13 | Stop reason: SDUPTHER

## 2019-05-28 RX ORDER — METFORMIN HYDROCHLORIDE 500 MG/1
500 TABLET ORAL 2 TIMES DAILY WITH MEALS
Qty: 180 TABLET | Refills: 1 | Status: SHIPPED | OUTPATIENT
Start: 2019-05-28 | End: 2020-02-19

## 2019-05-28 NOTE — TELEPHONE ENCOUNTER
From: Fanny Reddy  Sent: 5/27/2019 9:21 AM EDT  Subject: Medication Renewal Request    Fanny Reddy would like a refill of the following medications:     omeprazole (PriLOSEC) 40 mg capsule [Lupe Herman DO]   Patient Comment: Can I please get a 90 day supply - Many thanks     metFORMIN (GLUCOPHAGE) 500 mg tablet [Lupe Herman DO]    Preferred pharmacy: [facility] [id number] [place] [id number]  Delivery method: Pickup  Preferred pick-up date and time: 5/29/2019 5:00 PM

## 2019-05-30 DIAGNOSIS — G89.29 CHRONIC BILATERAL LOW BACK PAIN, WITH SCIATICA PRESENCE UNSPECIFIED: Primary | ICD-10-CM

## 2019-05-30 DIAGNOSIS — M54.5 CHRONIC BILATERAL LOW BACK PAIN, WITH SCIATICA PRESENCE UNSPECIFIED: Primary | ICD-10-CM

## 2019-06-04 RX ORDER — VALACYCLOVIR HYDROCHLORIDE 500 MG/1
500 TABLET, FILM COATED ORAL DAILY
Qty: 90 TABLET | Refills: 1 | Status: SHIPPED | OUTPATIENT
Start: 2019-06-04 | End: 2020-10-07 | Stop reason: SDUPTHER

## 2019-06-07 RX ORDER — DICLOFENAC SODIUM 10 MG/G
GEL TOPICAL
Qty: 100 G | Refills: 1 | Status: SHIPPED | OUTPATIENT
Start: 2019-06-07 | End: 2019-07-06 | Stop reason: SDUPTHER

## 2019-06-27 RX ORDER — TRIAMCINOLONE ACETONIDE 1 MG/G
1 OINTMENT TOPICAL 2 TIMES DAILY
Qty: 30 G | Refills: 1 | Status: SHIPPED | OUTPATIENT
Start: 2019-06-27 | End: 2019-07-23 | Stop reason: SDUPTHER

## 2019-07-01 ENCOUNTER — OFFICE VISIT (OUTPATIENT)
Dept: FAMILY MEDICINE | Facility: CLINIC | Age: 69
End: 2019-07-01
Payer: COMMERCIAL

## 2019-07-01 VITALS
BODY MASS INDEX: 40.67 KG/M2 | TEMPERATURE: 99.2 F | HEIGHT: 62 IN | WEIGHT: 221 LBS | HEART RATE: 84 BPM | OXYGEN SATURATION: 96 %

## 2019-07-01 DIAGNOSIS — L30.9 ECZEMA, UNSPECIFIED TYPE: Primary | ICD-10-CM

## 2019-07-01 PROCEDURE — 99213 OFFICE O/P EST LOW 20 MIN: CPT | Performed by: FAMILY MEDICINE

## 2019-07-01 ASSESSMENT — ENCOUNTER SYMPTOMS
FEVER: 0
ARTHRALGIAS: 0
CHILLS: 0
JOINT SWELLING: 0
WHEEZING: 0
FATIGUE: 0
SHORTNESS OF BREATH: 0
WOUND: 0
PALPITATIONS: 0

## 2019-07-01 NOTE — PROGRESS NOTES
"Subjective      Patient ID: Fanny Reddy is a 69 y.o. female.    HPI     Hx of eczema on hands   Worsening sx past week  Started triamcinolone 3 days ago and has already noticed some improvement  Worse is on the crevices of where her fingers are  No redness no oozing  +itching at night    The following have been reviewed and updated as appropriate in this visit:  Allergies  Meds  Problems       Review of Systems   Constitutional: Negative for chills, fatigue and fever.   Respiratory: Negative for shortness of breath and wheezing.    Cardiovascular: Negative for chest pain and palpitations.   Musculoskeletal: Negative for arthralgias and joint swelling.   Skin: Positive for rash. Negative for wound.       Social History     Social History   • Marital status: Single     Spouse name: N/A   • Number of children: N/A   • Years of education: N/A     Occupational History   • Not on file.     Social History Main Topics   • Smoking status: Never Smoker   • Smokeless tobacco: Never Used   • Alcohol use Yes      Comment: socially at celebratory events   • Drug use: No   • Sexual activity: No     Other Topics Concern   • Not on file     Social History Narrative   • No narrative on file       Objective     Vitals:    07/01/19 1058   Pulse: 84   Temp: 37.3 °C (99.2 °F)   SpO2: 96%   Weight: 100 kg (221 lb)   Height: 1.575 m (5' 2\")     Body mass index is 40.42 kg/m².    Physical Exam   Constitutional: She is oriented to person, place, and time. She appears well-developed and well-nourished.   Cardiovascular: Normal rate, regular rhythm and normal heart sounds.  Exam reveals no gallop and no friction rub.    No murmur heard.  Pulmonary/Chest: Effort normal and breath sounds normal. No respiratory distress. She has no wheezes. She has no rales.   Musculoskeletal: Normal range of motion. She exhibits no edema or tenderness.   Neurological: She is alert and oriented to person, place, and time.   Skin: Skin is warm. No " pallor.   +eczema patches/cracking near mcp flexure b/l hands  No s/sx of cellulitis   Psychiatric: She has a normal mood and affect. Her behavior is normal.       Assessment/Plan   Problem List Items Addressed This Visit     None      Visit Diagnoses     Eczema, unspecified type    -  Primary    cont topical triamcinolone, cont aquaphor/cera ve. no s/sx of infection at this time

## 2019-07-08 RX ORDER — DICLOFENAC SODIUM 10 MG/G
GEL TOPICAL
Qty: 100 G | Refills: 1 | Status: SHIPPED | OUTPATIENT
Start: 2019-07-08 | End: 2019-09-08 | Stop reason: SDUPTHER

## 2019-07-23 RX ORDER — TRIAMCINOLONE ACETONIDE 1 MG/G
OINTMENT TOPICAL
Qty: 30 G | Refills: 1 | Status: SHIPPED | OUTPATIENT
Start: 2019-07-23 | End: 2019-08-24 | Stop reason: SDUPTHER

## 2019-08-25 RX ORDER — TRIAMCINOLONE ACETONIDE 1 MG/G
OINTMENT TOPICAL
Qty: 30 G | Refills: 1 | Status: SHIPPED | OUTPATIENT
Start: 2019-08-25 | End: 2019-09-07 | Stop reason: SDUPTHER

## 2019-08-25 RX ORDER — METFORMIN HYDROCHLORIDE 500 MG/1
TABLET ORAL
Qty: 180 TABLET | Refills: 1 | Status: SHIPPED | OUTPATIENT
Start: 2019-08-25 | End: 2019-10-04

## 2019-08-26 DIAGNOSIS — M75.00 ADHESIVE CAPSULITIS OF SHOULDER, UNSPECIFIED LATERALITY: ICD-10-CM

## 2019-08-26 DIAGNOSIS — R53.83 OTHER FATIGUE: Primary | ICD-10-CM

## 2019-09-09 RX ORDER — DICLOFENAC SODIUM 10 MG/G
GEL TOPICAL
Qty: 100 G | Refills: 1 | Status: SHIPPED | OUTPATIENT
Start: 2019-09-09 | End: 2019-11-13 | Stop reason: SDUPTHER

## 2019-09-09 RX ORDER — TRIAMCINOLONE ACETONIDE 1 MG/G
OINTMENT TOPICAL
Qty: 30 G | Refills: 1 | Status: SHIPPED | OUTPATIENT
Start: 2019-09-09 | End: 2019-09-30 | Stop reason: SDUPTHER

## 2019-09-18 DIAGNOSIS — Z11.59 NEED FOR HEPATITIS C SCREENING TEST: ICD-10-CM

## 2019-09-18 DIAGNOSIS — Z00.00 ROUTINE GENERAL MEDICAL EXAMINATION AT A HEALTH CARE FACILITY: Primary | ICD-10-CM

## 2019-09-18 DIAGNOSIS — I10 ESSENTIAL HYPERTENSION, BENIGN: ICD-10-CM

## 2019-09-18 DIAGNOSIS — R53.83 FATIGUE, UNSPECIFIED TYPE: ICD-10-CM

## 2019-09-18 DIAGNOSIS — E11.9 CONTROLLED TYPE 2 DIABETES MELLITUS WITHOUT COMPLICATION, WITHOUT LONG-TERM CURRENT USE OF INSULIN (CMS/HCC): ICD-10-CM

## 2019-09-18 DIAGNOSIS — E78.5 HYPERLIPIDEMIA, UNSPECIFIED HYPERLIPIDEMIA TYPE: ICD-10-CM

## 2019-09-24 LAB
ALBUMIN SERPL-MCNC: 4.5 G/DL (ref 3.6–4.8)
ALBUMIN/GLOB SERPL: 2.4 {RATIO} (ref 1.2–2.2)
ALP SERPL-CCNC: 85 IU/L (ref 39–117)
ALT SERPL-CCNC: 48 IU/L (ref 0–32)
AST SERPL-CCNC: 36 IU/L (ref 0–40)
BASOPHILS # BLD AUTO: 0.1 X10E3/UL (ref 0–0.2)
BASOPHILS NFR BLD AUTO: 1 %
BILIRUB SERPL-MCNC: 0.4 MG/DL (ref 0–1.2)
BUN SERPL-MCNC: 14 MG/DL (ref 8–27)
BUN/CREAT SERPL: 14 (ref 12–28)
CALCIUM SERPL-MCNC: 9.6 MG/DL (ref 8.7–10.3)
CHLORIDE SERPL-SCNC: 102 MMOL/L (ref 96–106)
CHOLEST SERPL-MCNC: 150 MG/DL (ref 100–199)
CO2 SERPL-SCNC: 23 MMOL/L (ref 20–29)
CREAT SERPL-MCNC: 0.97 MG/DL (ref 0.57–1)
EOSINOPHIL # BLD AUTO: 0.1 X10E3/UL (ref 0–0.4)
EOSINOPHIL NFR BLD AUTO: 3 %
ERYTHROCYTE [DISTWIDTH] IN BLOOD BY AUTOMATED COUNT: 15.9 % (ref 12.3–15.4)
GLOBULIN SER CALC-MCNC: 1.9 G/DL (ref 1.5–4.5)
GLUCOSE SERPL-MCNC: 160 MG/DL (ref 65–99)
HBA1C MFR BLD: 8.4 % (ref 4.8–5.6)
HCT VFR BLD AUTO: 38.8 % (ref 34–46.6)
HCV AB S/CO SERPL IA: 0.1 S/CO RATIO (ref 0–0.9)
HDLC SERPL-MCNC: 52 MG/DL
HGB BLD-MCNC: 12.7 G/DL (ref 11.1–15.9)
IMM GRANULOCYTES # BLD AUTO: 0 X10E3/UL (ref 0–0.1)
IMM GRANULOCYTES NFR BLD AUTO: 1 %
LAB CORP EGFR IF AFRICN AM: 69 ML/MIN/1.73
LAB CORP EGFR IF NONAFRICN AM: 60 ML/MIN/1.73
LDLC SERPL CALC-MCNC: 75 MG/DL (ref 0–99)
LYMPHOCYTES # BLD AUTO: 1.7 X10E3/UL (ref 0.7–3.1)
LYMPHOCYTES NFR BLD AUTO: 35 %
MCH RBC QN AUTO: 25.3 PG (ref 26.6–33)
MCHC RBC AUTO-ENTMCNC: 32.7 G/DL (ref 31.5–35.7)
MCV RBC AUTO: 77 FL (ref 79–97)
MICROALBUMIN UR-MCNC: 3.3 UG/ML
MONOCYTES # BLD AUTO: 0.3 X10E3/UL (ref 0.1–0.9)
MONOCYTES NFR BLD AUTO: 6 %
NEUTROPHILS # BLD AUTO: 2.6 X10E3/UL (ref 1.4–7)
NEUTROPHILS NFR BLD AUTO: 54 %
PLATELET # BLD AUTO: 271 X10E3/UL (ref 150–450)
POTASSIUM SERPL-SCNC: 4.5 MMOL/L (ref 3.5–5.2)
PROT SERPL-MCNC: 6.4 G/DL (ref 6–8.5)
RBC # BLD AUTO: 5.01 X10E6/UL (ref 3.77–5.28)
SODIUM SERPL-SCNC: 139 MMOL/L (ref 134–144)
T4 FREE SERPL-MCNC: 1.19 NG/DL (ref 0.82–1.77)
TRIGL SERPL-MCNC: 117 MG/DL (ref 0–149)
TSH SERPL DL<=0.005 MIU/L-ACNC: 4.25 UIU/ML (ref 0.45–4.5)
VLDLC SERPL CALC-MCNC: 23 MG/DL (ref 5–40)
WBC # BLD AUTO: 4.9 X10E3/UL (ref 3.4–10.8)

## 2019-09-27 RX ORDER — ROSUVASTATIN CALCIUM 20 MG/1
TABLET, COATED ORAL
Qty: 90 TABLET | Refills: 1 | Status: SHIPPED | OUTPATIENT
Start: 2019-09-27 | End: 2019-10-04

## 2019-09-30 RX ORDER — TRIAMCINOLONE ACETONIDE 1 MG/G
OINTMENT TOPICAL
Qty: 30 G | Refills: 1 | Status: SHIPPED | OUTPATIENT
Start: 2019-09-30 | End: 2019-11-27 | Stop reason: SDUPTHER

## 2019-10-04 ENCOUNTER — OFFICE VISIT (OUTPATIENT)
Dept: FAMILY MEDICINE | Facility: CLINIC | Age: 69
End: 2019-10-04
Payer: COMMERCIAL

## 2019-10-04 VITALS
HEIGHT: 62 IN | SYSTOLIC BLOOD PRESSURE: 128 MMHG | DIASTOLIC BLOOD PRESSURE: 82 MMHG | BODY MASS INDEX: 39.56 KG/M2 | OXYGEN SATURATION: 96 % | TEMPERATURE: 99 F | WEIGHT: 215 LBS | HEART RATE: 88 BPM

## 2019-10-04 DIAGNOSIS — E78.2 MULTIPLE-TYPE HYPERLIPIDEMIA: ICD-10-CM

## 2019-10-04 DIAGNOSIS — E11.65 UNCONTROLLED TYPE 2 DIABETES MELLITUS WITH HYPERGLYCEMIA (CMS/HCC): Primary | ICD-10-CM

## 2019-10-04 DIAGNOSIS — E11.9 TYPE 2 DIABETES MELLITUS WITHOUT COMPLICATION, WITHOUT LONG-TERM CURRENT USE OF INSULIN (CMS/HCC): ICD-10-CM

## 2019-10-04 DIAGNOSIS — I10 BENIGN ESSENTIAL HYPERTENSION: Primary | ICD-10-CM

## 2019-10-04 PROCEDURE — 99214 OFFICE O/P EST MOD 30 MIN: CPT | Mod: 25 | Performed by: FAMILY MEDICINE

## 2019-10-04 PROCEDURE — 90653 IIV ADJUVANT VACCINE IM: CPT | Performed by: FAMILY MEDICINE

## 2019-10-04 PROCEDURE — G0008 ADMIN INFLUENZA VIRUS VAC: HCPCS | Performed by: FAMILY MEDICINE

## 2019-10-04 RX ORDER — INSULIN PUMP SYRINGE, 3 ML
EACH MISCELLANEOUS
Qty: 1 EACH | Refills: 0 | Status: SHIPPED | OUTPATIENT
Start: 2019-10-04 | End: 2020-10-03

## 2019-10-04 ASSESSMENT — ENCOUNTER SYMPTOMS
SORE THROAT: 0
ABDOMINAL PAIN: 0
COUGH: 0
DIZZINESS: 0
VOMITING: 0
APPETITE CHANGE: 0
CONSTIPATION: 0
FEVER: 0
FREQUENCY: 0
BACK PAIN: 0
TROUBLE SWALLOWING: 0
NAUSEA: 0
NUMBNESS: 0
HEMATURIA: 0
ARTHRALGIAS: 0
WHEEZING: 0
DIARRHEA: 0
SHORTNESS OF BREATH: 0
EYE PAIN: 0
FATIGUE: 0
NERVOUS/ANXIOUS: 0
ACTIVITY CHANGE: 0

## 2019-10-04 NOTE — PROGRESS NOTES
Subjective      Patient ID: Fanny Reddy is a 69 y.o. female.    HPI     Chronic conditions  Hypertension stable   hyperlipidemia stable  Diabetes type 2 patient's A1c increased to over 8% on most recent labs.  Patient states she was noncompliant when she was on vacation in [place] she would forget her nighttime dosing of metformin almost daily.  She also did not pay too much attention to the food that she was eating.  She states when she saw that lab value on her portal that was a wake-up call for her to get her act back and check.  She has since been compliant with her medication.  She was at her GYNs office who also has a nutritionist whom she met with.  Nutrition is provided her with some good options for all meals including snacks.  Patient has since already adopted that into her daily habits.  She states in 1 week she is Tim lost 3 pounds and that has been motivation for her to keep going.    The following have been reviewed and updated as appropriate in this visit:  Allergies  Meds  Problems       Review of Systems   Constitutional: Negative for activity change, appetite change, fatigue and fever.   HENT: Negative for congestion, ear pain, postnasal drip, sore throat and trouble swallowing.    Eyes: Negative for pain.   Respiratory: Negative for cough, shortness of breath and wheezing.    Cardiovascular: Negative for chest pain.   Gastrointestinal: Negative for abdominal pain, constipation, diarrhea, nausea and vomiting.   Genitourinary: Negative for frequency and hematuria.   Musculoskeletal: Negative for arthralgias and back pain.   Skin: Negative for rash.   Neurological: Negative for dizziness and numbness.   Psychiatric/Behavioral: The patient is not nervous/anxious.        Social History     Social History   • Marital status: Single     Spouse name: N/A   • Number of children: N/A   • Years of education: N/A     Occupational History   • Not on file.     Social History Main Topics   •  "Smoking status: Never Smoker   • Smokeless tobacco: Never Used   • Alcohol use Yes      Comment: socially at celebratory events   • Drug use: No   • Sexual activity: No     Other Topics Concern   • Not on file     Social History Narrative   • No narrative on file       Objective     Vitals:    10/04/19 0912   BP: 128/82   Pulse: 88   Temp: 37.2 °C (99 °F)   SpO2: 96%   Weight: 97.5 kg (215 lb)   Height: 1.575 m (5' 2\")     Body mass index is 39.32 kg/m².    Physical Exam   Constitutional: She is oriented to person, place, and time. She appears well-developed and well-nourished.   HENT:   Head: Normocephalic and atraumatic.   Eyes: EOM are normal.   Neck: Normal range of motion. Neck supple. No thyromegaly present.   Cardiovascular: Normal rate, regular rhythm, normal heart sounds and intact distal pulses.  Exam reveals no gallop and no friction rub.    No murmur heard.  Pulmonary/Chest: Effort normal and breath sounds normal. No respiratory distress. She has no wheezes. She has no rales.   Musculoskeletal: She exhibits no edema.   Lymphadenopathy:     She has no cervical adenopathy.   Neurological: She is alert and oriented to person, place, and time.   Skin: Skin is warm and dry. No rash noted. No erythema.   Psychiatric: She has a normal mood and affect. Her behavior is normal.       Assessment/Plan   Problem List Items Addressed This Visit     Benign essential hypertension - Primary     Stable, cont current regimen  Counseled low salt/sodium diet and increased cardiovascular exercise           Type 2 diabetes mellitus (CMS/[facility])     a1c increased to >8%  Pt admitted to non compliance with medication and seeing that value scared her.  She has been diligent about taking her meds since. She has also started working with a nutritionist and has lost 3 lbs in 1 week.  Recheck a1c in 3 months  Cont acei and statin  Pt compliant with podiatry and ophtho annually         Relevant Orders    Hemoglobin A1c    Comprehensive " metabolic panel    Multiple-type hyperlipidemia     Cont statin         BMI 39.0-39.9,adult          mammo and dexa - has set up for 10/18/19  Flu administered   shingrix - on waiting list on shop [employer]

## 2019-10-04 NOTE — ASSESSMENT & PLAN NOTE
a1c increased to >8%  Pt admitted to non compliance with medication and seeing that value scared her.  She has been diligent about taking her meds since. She has also started working with a nutritionist and has lost 3 lbs in 1 week.  Recheck a1c in 3 months  Cont acei and statin  Pt compliant with podiatry and ophtho annually

## 2019-10-18 ENCOUNTER — HOSPITAL ENCOUNTER (OUTPATIENT)
Dept: RADIOLOGY | Facility: HOSPITAL | Age: 69
Discharge: HOME | End: 2019-10-18
Attending: FAMILY MEDICINE
Payer: COMMERCIAL

## 2019-10-18 DIAGNOSIS — Z12.31 SCREENING MAMMOGRAM, ENCOUNTER FOR: ICD-10-CM

## 2019-10-18 DIAGNOSIS — Z13.820 SCREENING FOR OSTEOPOROSIS: ICD-10-CM

## 2019-10-18 PROCEDURE — 77063 BREAST TOMOSYNTHESIS BI: CPT

## 2019-10-18 PROCEDURE — 77080 DXA BONE DENSITY AXIAL: CPT

## 2019-11-04 RX ORDER — LISINOPRIL AND HYDROCHLOROTHIAZIDE 10; 12.5 MG/1; MG/1
1 TABLET ORAL
Qty: 90 TABLET | Refills: 1 | Status: SHIPPED | OUTPATIENT
Start: 2019-11-04 | End: 2020-04-02 | Stop reason: SDUPTHER

## 2019-11-13 RX ORDER — OMEPRAZOLE 40 MG/1
40 CAPSULE, DELAYED RELEASE ORAL
Qty: 90 CAPSULE | Refills: 1 | Status: SHIPPED | OUTPATIENT
Start: 2019-11-13 | End: 2020-05-05 | Stop reason: SDUPTHER

## 2019-11-13 RX ORDER — DICLOFENAC SODIUM 10 MG/G
GEL TOPICAL
Qty: 100 G | Refills: 1 | Status: SHIPPED | OUTPATIENT
Start: 2019-11-13 | End: 2019-12-01 | Stop reason: SDUPTHER

## 2019-11-27 RX ORDER — TRIAMCINOLONE ACETONIDE 1 MG/G
OINTMENT TOPICAL
Qty: 30 G | Refills: 1 | Status: SHIPPED | OUTPATIENT
Start: 2019-11-27 | End: 2019-12-12 | Stop reason: SDUPTHER

## 2019-12-01 RX ORDER — DICLOFENAC SODIUM 10 MG/G
GEL TOPICAL
Qty: 100 G | Refills: 1 | Status: SHIPPED | OUTPATIENT
Start: 2019-12-01 | End: 2020-12-14

## 2019-12-02 RX ORDER — DICLOFENAC SODIUM 10 MG/G
GEL TOPICAL
Qty: 100 G | Refills: 1 | Status: SHIPPED | OUTPATIENT
Start: 2019-12-02 | End: 2020-12-14

## 2019-12-12 RX ORDER — TRIAMCINOLONE ACETONIDE 1 MG/G
OINTMENT TOPICAL
Qty: 30 G | Refills: 1 | Status: SHIPPED | OUTPATIENT
Start: 2019-12-12 | End: 2020-01-22

## 2019-12-30 RX ORDER — NAPROXEN 500 MG/1
500 TABLET ORAL 2 TIMES DAILY WITH MEALS
Qty: 60 TABLET | Refills: 1 | Status: SHIPPED | OUTPATIENT
Start: 2019-12-30 | End: 2020-03-09

## 2019-12-30 RX ORDER — NAPROXEN 500 MG/1
TABLET ORAL
COMMUNITY
Start: 2019-11-13 | End: 2019-12-30 | Stop reason: SDUPTHER

## 2020-01-21 DIAGNOSIS — E11.9 TYPE 2 DIABETES MELLITUS WITHOUT COMPLICATION, WITHOUT LONG-TERM CURRENT USE OF INSULIN (CMS/HCC): Primary | ICD-10-CM

## 2020-01-22 RX ORDER — TRIAMCINOLONE ACETONIDE 1 MG/G
OINTMENT TOPICAL 2 TIMES DAILY
Qty: 30 G | Refills: 1 | Status: SHIPPED | OUTPATIENT
Start: 2020-01-22 | End: 2020-02-21

## 2020-02-18 ENCOUNTER — HOSPITAL ENCOUNTER (EMERGENCY)
Facility: HOSPITAL | Age: 70
Discharge: HOME | End: 2020-02-18
Attending: EMERGENCY MEDICINE | Admitting: EMERGENCY MEDICINE
Payer: COMMERCIAL

## 2020-02-18 VITALS
TEMPERATURE: 98 F | DIASTOLIC BLOOD PRESSURE: 78 MMHG | HEIGHT: 62 IN | BODY MASS INDEX: 39.56 KG/M2 | RESPIRATION RATE: 15 BRPM | SYSTOLIC BLOOD PRESSURE: 162 MMHG | WEIGHT: 215 LBS | HEART RATE: 70 BPM | OXYGEN SATURATION: 99 %

## 2020-02-18 DIAGNOSIS — K62.5 RECTAL BLEEDING: Primary | ICD-10-CM

## 2020-02-18 LAB
ABO + RH BLD: NORMAL
ALBUMIN SERPL-MCNC: 4 G/DL (ref 3.4–5)
ALP SERPL-CCNC: 66 IU/L (ref 35–126)
ALT SERPL-CCNC: 30 IU/L (ref 11–54)
ANION GAP SERPL CALC-SCNC: 11 MEQ/L (ref 3–15)
APTT PPP: 26 SEC (ref 23–35)
AST SERPL-CCNC: 28 IU/L (ref 15–41)
BASOPHILS # BLD: 0.05 K/UL (ref 0.01–0.1)
BASOPHILS NFR BLD: 1 %
BILIRUB SERPL-MCNC: 0.7 MG/DL (ref 0.3–1.2)
BLD GP AB SCN SERPL QL: NEGATIVE
BUN SERPL-MCNC: 13 MG/DL (ref 8–20)
CALCIUM SERPL-MCNC: 8.7 MG/DL (ref 8.9–10.3)
CHLORIDE SERPL-SCNC: 106 MEQ/L (ref 98–109)
CO2 SERPL-SCNC: 22 MEQ/L (ref 22–32)
CREAT SERPL-MCNC: 0.9 MG/DL (ref 0.6–1.1)
D AG BLD QL: NEGATIVE
DIFFERENTIAL METHOD BLD: ABNORMAL
EOSINOPHIL # BLD: 0.22 K/UL (ref 0.04–0.36)
EOSINOPHIL NFR BLD: 4.5 %
ERYTHROCYTE [DISTWIDTH] IN BLOOD BY AUTOMATED COUNT: 14.2 % (ref 11.7–14.4)
GFR SERPL CREATININE-BSD FRML MDRD: >60 ML/MIN/1.73M*2
GLUCOSE SERPL-MCNC: 225 MG/DL (ref 70–99)
HCT VFR BLDCO AUTO: 39.3 % (ref 35–45)
HGB BLD-MCNC: 12.4 G/DL (ref 11.8–15.7)
IMM GRANULOCYTES # BLD AUTO: 0.04 K/UL (ref 0–0.08)
IMM GRANULOCYTES NFR BLD AUTO: 0.8 %
INR PPP: 1 INR
LABORATORY COMMENT REPORT: NORMAL
LYMPHOCYTES # BLD: 1.77 K/UL (ref 1.2–3.5)
LYMPHOCYTES NFR BLD: 36.1 %
MCH RBC QN AUTO: 24.9 PG (ref 28–33.2)
MCHC RBC AUTO-ENTMCNC: 31.6 G/DL (ref 32.2–35.5)
MCV RBC AUTO: 79.1 FL (ref 83–98)
MONOCYTES # BLD: 0.43 K/UL (ref 0.28–0.8)
MONOCYTES NFR BLD: 8.8 %
NEUTROPHILS # BLD: 2.39 K/UL (ref 1.7–7)
NEUTS SEG NFR BLD: 48.8 %
NRBC BLD-RTO: 0 %
PDW BLD AUTO: 9.6 FL (ref 9.4–12.3)
PLATELET # BLD AUTO: 300 K/UL (ref 150–369)
POTASSIUM SERPL-SCNC: 5 MEQ/L (ref 3.6–5.1)
PROT SERPL-MCNC: 6.2 G/DL (ref 6–8.2)
PROTHROMBIN TIME: 12.9 SEC (ref 12.2–14.5)
RBC # BLD AUTO: 4.97 M/UL (ref 3.93–5.22)
SODIUM SERPL-SCNC: 139 MEQ/L (ref 136–144)
WBC # BLD AUTO: 4.9 K/UL (ref 3.8–10.5)

## 2020-02-18 PROCEDURE — 80053 COMPREHEN METABOLIC PANEL: CPT | Performed by: EMERGENCY MEDICINE

## 2020-02-18 PROCEDURE — 85730 THROMBOPLASTIN TIME PARTIAL: CPT | Performed by: EMERGENCY MEDICINE

## 2020-02-18 PROCEDURE — 36415 COLL VENOUS BLD VENIPUNCTURE: CPT | Performed by: EMERGENCY MEDICINE

## 2020-02-18 PROCEDURE — 99283 EMERGENCY DEPT VISIT LOW MDM: CPT

## 2020-02-18 PROCEDURE — 63700000 HC SELF-ADMINISTRABLE DRUG: Performed by: EMERGENCY MEDICINE

## 2020-02-18 PROCEDURE — 85025 COMPLETE CBC W/AUTO DIFF WBC: CPT | Performed by: EMERGENCY MEDICINE

## 2020-02-18 PROCEDURE — 85610 PROTHROMBIN TIME: CPT | Performed by: EMERGENCY MEDICINE

## 2020-02-18 PROCEDURE — 86900 BLOOD TYPING SEROLOGIC ABO: CPT

## 2020-02-18 RX ADMIN — LISINOPRIL: 10 TABLET ORAL at 07:27

## 2020-02-18 ASSESSMENT — ENCOUNTER SYMPTOMS
LIGHT-HEADEDNESS: 0
EYE PAIN: 0
SEIZURES: 0
CHILLS: 0
PALPITATIONS: 0
SHORTNESS OF BREATH: 0
HEMATOCHEZIA: 1
ABDOMINAL PAIN: 0
VOMITING: 0
FEVER: 0
HEMATURIA: 0
BLOOD IN STOOL: 1
ARTHRALGIAS: 0
CONSTIPATION: 1
DYSURIA: 0
COLOR CHANGE: 0
DIARRHEA: 1
BACK PAIN: 0
SORE THROAT: 0
COUGH: 0

## 2020-02-18 NOTE — ED PROVIDER NOTES
HPI     Chief Complaint   Patient presents with   • Rectal Bleeding         History provided by:  Patient  Rectal Bleeding   Quality:  Bright red  Amount:  Moderate  Chronicity:  New  Context: diarrhea (3 episodes this morning)    Similar prior episodes: no    Relieved by:  Nothing  Ineffective treatments:  None tried  Associated symptoms: no abdominal pain, no fever, no light-headedness and no vomiting         Patient History     Past Medical History:   Diagnosis Date   • Type 2 diabetes mellitus (CMS/HCC)        History reviewed. No pertinent surgical history.    History reviewed. No pertinent family history.    Social History     Tobacco Use   • Smoking status: Never Smoker   • Smokeless tobacco: Never Used   Substance Use Topics   • Alcohol use: Yes     Comment: socially at [employer] events   • Drug use: No       Systems Reviewed from Nursing Triage:          Review of Systems     Review of Systems   Constitutional: Negative for chills and fever.   HENT: Negative for ear pain and sore throat.    Eyes: Negative for pain and visual disturbance.   Respiratory: Negative for cough and shortness of breath.    Cardiovascular: Negative for chest pain and palpitations.   Gastrointestinal: Positive for blood in stool, constipation, diarrhea and hematochezia. Negative for abdominal pain and vomiting.   Genitourinary: Negative for dysuria and hematuria.   Musculoskeletal: Negative for arthralgias and back pain.   Skin: Negative for color change and rash.   Neurological: Negative for seizures, syncope and light-headedness.   All other systems reviewed and are negative.       Physical Exam     ED Triage Vitals [02/18/20 0626]   Temp Heart Rate Resp BP SpO2   -- 77 16 (!) 208/87 98 %      Temp src Heart Rate Source Patient Position BP Location FiO2 (%) (Set)   -- -- Lying Right upper arm --                     Patient Vitals for the past 24 hrs:   BP Pulse Resp SpO2 Height Weight   02/18/20 0626 (!) 208/87 77 16 98 % 1.575 m  "(5' 2\") 97.5 kg (215 lb)                                       Physical Exam   Constitutional: She appears well-developed and well-nourished. No distress.   HENT:   Head: Normocephalic and atraumatic.   Eyes: Conjunctivae are normal. No scleral icterus.   Neck: Normal range of motion. Neck supple. No JVD present.   Cardiovascular: Normal rate and regular rhythm.   Pulmonary/Chest: Effort normal and breath sounds normal. No respiratory distress.   Abdominal: Soft. There is no tenderness.   Genitourinary:   Genitourinary Comments: Heme positive  Bloody brown stool     Musculoskeletal: She exhibits no edema.   Neurological: She is alert. She has normal strength.   Skin: Skin is warm and dry.   Psychiatric: She has a normal mood and affect. Her behavior is normal.   Nursing note and vitals reviewed.           Procedures    ED Course & MDM     Labs Reviewed   CBC AND DIFF   PROTIME-INR   PTT   TYPE AND SCREEN   COMPREHENSIVE METABOLIC PANEL       No orders to display               MDM      Scribe Attestation  By signing my name below, I, Alfredo Teixeira, attest that this documentation has been prepared under the direction and in the presence of Dr. Covington.    2/18/2020 6:58 AM    I, Krysta Covington, personally performed the services described in this documentation, as documented by the scribe in my presence, and it is both accurate and complete.  2/18/2020 7:44 AM        ED Course as of Feb 18 1007   Tue Feb 18, 2020   0650 68yo female h/o HTN, DM and chol c/o 3 episodes of loose stools this morning. At the end of the last episode, noticed BRBPR. Never happened before. Last colonoscopy 3 years ago showed polyps, that were removed. Recently has been constipated. No abd pain. Takes baby ASA daily. Occas takes Naproxen for knee pain.  Soft abd, rectal: blood  Did not take her BP meds today.  Will check labs and reassess.    [KEO]   0744 Hgb 12.4  BP stabilizing    [KEO]   0840 Pt has had no further bleeding in ED. Feels " comfortable f/u with Dr. Willams (her GI doc) as outpatient.    [KEO]      ED Course User Index  [KEO] Krysta Covington MD         Clinical Impressions as of Feb 18 1007   Rectal bleeding        Alfredo Teixeira  02/18/20 0659       Krysta Covington MD  02/18/20 1007

## 2020-02-19 RX ORDER — METFORMIN HYDROCHLORIDE 500 MG/1
TABLET ORAL
Qty: 180 TABLET | Refills: 1 | Status: SHIPPED | OUTPATIENT
Start: 2020-02-19 | End: 2020-02-21 | Stop reason: SDUPTHER

## 2020-02-21 ENCOUNTER — OFFICE VISIT (OUTPATIENT)
Dept: FAMILY MEDICINE | Facility: CLINIC | Age: 70
End: 2020-02-21
Payer: COMMERCIAL

## 2020-02-21 VITALS
DIASTOLIC BLOOD PRESSURE: 80 MMHG | SYSTOLIC BLOOD PRESSURE: 132 MMHG | BODY MASS INDEX: 39.93 KG/M2 | WEIGHT: 217 LBS | HEIGHT: 62 IN | OXYGEN SATURATION: 96 % | HEART RATE: 86 BPM | TEMPERATURE: 98.8 F

## 2020-02-21 DIAGNOSIS — E11.9 TYPE 2 DIABETES MELLITUS WITHOUT COMPLICATION, WITHOUT LONG-TERM CURRENT USE OF INSULIN (CMS/HCC): ICD-10-CM

## 2020-02-21 DIAGNOSIS — N18.30 TYPE 2 DIABETES MELLITUS WITH STAGE 3 CHRONIC KIDNEY DISEASE, WITHOUT LONG-TERM CURRENT USE OF INSULIN (CMS/HCC): ICD-10-CM

## 2020-02-21 DIAGNOSIS — J38.2 VOCAL CORD NODULE: ICD-10-CM

## 2020-02-21 DIAGNOSIS — I10 BENIGN ESSENTIAL HYPERTENSION: Primary | ICD-10-CM

## 2020-02-21 DIAGNOSIS — E78.2 MULTIPLE-TYPE HYPERLIPIDEMIA: ICD-10-CM

## 2020-02-21 DIAGNOSIS — E11.22 TYPE 2 DIABETES MELLITUS WITH STAGE 3 CHRONIC KIDNEY DISEASE, WITHOUT LONG-TERM CURRENT USE OF INSULIN (CMS/HCC): ICD-10-CM

## 2020-02-21 PROCEDURE — 99214 OFFICE O/P EST MOD 30 MIN: CPT | Performed by: FAMILY MEDICINE

## 2020-02-21 RX ORDER — TRIAMCINOLONE ACETONIDE 1 MG/G
OINTMENT TOPICAL
Qty: 30 G | Refills: 1 | Status: SHIPPED | OUTPATIENT
Start: 2020-02-21 | End: 2020-03-17

## 2020-02-21 RX ORDER — METFORMIN HYDROCHLORIDE 500 MG/1
1000 TABLET ORAL 2 TIMES DAILY WITH MEALS
Qty: 360 TABLET | Refills: 1 | Status: SHIPPED | OUTPATIENT
Start: 2020-02-21 | End: 2020-07-22 | Stop reason: SDUPTHER

## 2020-02-21 ASSESSMENT — ENCOUNTER SYMPTOMS
WHEEZING: 0
NUMBNESS: 0
HEADACHES: 1
FREQUENCY: 0
CONSTIPATION: 0
FATIGUE: 0
HEMATURIA: 0
WEAKNESS: 0
DIARRHEA: 0
TROUBLE SWALLOWING: 0
COUGH: 0
APPETITE CHANGE: 0
EYE PAIN: 0
ABDOMINAL PAIN: 0
FEVER: 0
BACK PAIN: 0
VOMITING: 0
SORE THROAT: 0
SHORTNESS OF BREATH: 0
ACTIVITY CHANGE: 0
ARTHRALGIAS: 0
NERVOUS/ANXIOUS: 0
NAUSEA: 0
DIZZINESS: 0

## 2020-02-21 NOTE — ASSESSMENT & PLAN NOTE
Stable, cont current regimen  Counseled low salt/sodium diet and increased cardiovascular exercise    Elevated in ER but today wnl, most likely due to ER stressors

## 2020-02-21 NOTE — PROGRESS NOTES
Subjective      Patient ID: Fanny Reddy is a 69 y.o. female.    HPI     Recent ER visit for rectal bleeding    Had episode of BRBPR - stool and toilet bowl - no episodes since  Has follow up with Dr. Willams - most likely internal hemorrhoid and to use otc prep h  Has colonoscopy end of April 2020      htn - elevated bp in ER  dm2 - elevated sugars in ER - last a1c was elevated too and pt admits to not being compliant with foods she should be eating      Denies cp, sob, abdominal pain    The following have been reviewed and updated as appropriate in this visit:  Allergies  Meds  Problems       Review of Systems   Constitutional: Negative for activity change, appetite change, fatigue and fever.   HENT: Negative for congestion, ear pain, postnasal drip, sore throat and trouble swallowing.    Eyes: Negative for pain.   Respiratory: Negative for cough, shortness of breath and wheezing.    Cardiovascular: Negative for chest pain.   Gastrointestinal: Negative for abdominal pain, constipation, diarrhea, nausea and vomiting.   Genitourinary: Negative for frequency and hematuria.   Musculoskeletal: Negative for arthralgias and back pain.   Skin: Negative for rash.   Neurological: Positive for headaches. Negative for dizziness, syncope, weakness and numbness.   Psychiatric/Behavioral: The patient is not nervous/anxious.        Social History     Socioeconomic History   • Marital status: Single     Spouse name: Not on file   • Number of children: Not on file   • Years of education: Not on file   • Highest education level: Not on file   Occupational History   • Not on file   Social Needs   • Financial resource strain: Not on file   • Food insecurity:     Worry: Not on file     Inability: Not on file   • Transportation needs:     Medical: Not on file     Non-medical: Not on file   Tobacco Use   • Smoking status: Never Smoker   • Smokeless tobacco: Never Used   Substance and Sexual Activity   • Alcohol use: Yes     " Comment: socially at celebratory events   • Drug use: No   • Sexual activity: Never   Lifestyle   • Physical activity:     Days per week: Not on file     Minutes per session: Not on file   • Stress: Not on file   Relationships   • Social connections:     Talks on phone: Not on file     Gets together: Not on file     Attends Shinto service: Not on file     Active member of club or organization: Not on file     Attends meetings of clubs or organizations: Not on file     Relationship status: Not on file   • Intimate partner violence:     Fear of current or ex partner: Not on file     Emotionally abused: Not on file     Physically abused: Not on file     Forced sexual activity: Not on file   Other Topics Concern   • Not on file   Social History Narrative   • Not on file       Objective     Vitals:    02/21/20 1131   BP: 132/80   Pulse: 86   Temp: 37.1 °C (98.8 °F)   SpO2: 96%   Weight: 98.4 kg (217 lb)   Height: 1.575 m (5' 2\")     Body mass index is 39.69 kg/m².    Physical Exam   Constitutional: She is oriented to person, place, and time. She appears well-developed and well-nourished.   HENT:   Head: Normocephalic and atraumatic.   Eyes: EOM are normal.   Neck: No thyromegaly present.   Cardiovascular: Normal rate, regular rhythm, normal heart sounds and intact distal pulses. Exam reveals no gallop and no friction rub.   No murmur heard.  Pulmonary/Chest: Effort normal and breath sounds normal. No stridor. No respiratory distress. She has no wheezes.   Abdominal: Soft. Bowel sounds are normal. She exhibits no distension. There is no tenderness.   Neurological: She is alert and oriented to person, place, and time.   Skin: Skin is warm and dry.   Psychiatric: She has a normal mood and affect. Her behavior is normal.       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 dupont         Relevant Medications    metFORMIN (GLUCOPHAGE) 500 mg tablet    Multiple-type hyperlipidemia     Cont statin            Other    BMI 39.0-39.9,adult      Other Visit Diagnoses     Vocal cord nodule        Relevant Orders    Ambulatory referral to ENT

## 2020-03-09 RX ORDER — NAPROXEN 500 MG/1
TABLET ORAL
Qty: 60 TABLET | Refills: 1 | Status: SHIPPED | OUTPATIENT
Start: 2020-03-09 | End: 2020-05-05

## 2020-03-17 LAB
ALBUMIN SERPL-MCNC: 4.5 G/DL (ref 3.8–4.8)
ALBUMIN/GLOB SERPL: 2.3 {RATIO} (ref 1.2–2.2)
ALP SERPL-CCNC: 81 IU/L (ref 39–117)
ALT SERPL-CCNC: 26 IU/L (ref 0–32)
AST SERPL-CCNC: 27 IU/L (ref 0–40)
BILIRUB SERPL-MCNC: 0.3 MG/DL (ref 0–1.2)
BUN SERPL-MCNC: 12 MG/DL (ref 8–27)
BUN/CREAT SERPL: 13 (ref 12–28)
CALCIUM SERPL-MCNC: 9.6 MG/DL (ref 8.7–10.3)
CHLORIDE SERPL-SCNC: 99 MMOL/L (ref 96–106)
CO2 SERPL-SCNC: 23 MMOL/L (ref 20–29)
CREAT SERPL-MCNC: 0.91 MG/DL (ref 0.57–1)
GLOBULIN SER CALC-MCNC: 2 G/DL (ref 1.5–4.5)
GLUCOSE SERPL-MCNC: 166 MG/DL (ref 65–99)
HBA1C MFR BLD: 8.3 % (ref 4.8–5.6)
LAB CORP EGFR IF AFRICN AM: 75 ML/MIN/1.73
LAB CORP EGFR IF NONAFRICN AM: 65 ML/MIN/1.73
POTASSIUM SERPL-SCNC: 4.2 MMOL/L (ref 3.5–5.2)
PROT SERPL-MCNC: 6.5 G/DL (ref 6–8.5)
SODIUM SERPL-SCNC: 138 MMOL/L (ref 134–144)

## 2020-03-17 RX ORDER — TRIAMCINOLONE ACETONIDE 1 MG/G
OINTMENT TOPICAL
Qty: 30 G | Refills: 1 | Status: SHIPPED | OUTPATIENT
Start: 2020-03-17 | End: 2020-04-14

## 2020-03-22 RX ORDER — ROSUVASTATIN CALCIUM 20 MG/1
TABLET, COATED ORAL
Qty: 90 TABLET | Refills: 1 | Status: SHIPPED | OUTPATIENT
Start: 2020-03-22 | End: 2020-09-18

## 2020-03-26 ENCOUNTER — TELEPHONE (OUTPATIENT)
Dept: FAMILY MEDICINE | Facility: CLINIC | Age: 70
End: 2020-03-26

## 2020-03-26 NOTE — TELEPHONE ENCOUNTER
LVM to Pt notifying them that their appt has been canceled due to the outbreak of Covid 19. And to call back to reschedule.

## 2020-04-02 RX ORDER — LISINOPRIL AND HYDROCHLOROTHIAZIDE 10; 12.5 MG/1; MG/1
1 TABLET ORAL
Qty: 90 TABLET | Refills: 1 | Status: SHIPPED | OUTPATIENT
Start: 2020-04-02 | End: 2020-04-30

## 2020-04-14 RX ORDER — TRIAMCINOLONE ACETONIDE 1 MG/G
OINTMENT TOPICAL
Qty: 30 G | Refills: 1 | Status: SHIPPED | OUTPATIENT
Start: 2020-04-14 | End: 2020-12-14

## 2020-04-27 ENCOUNTER — TELEMEDICINE (OUTPATIENT)
Dept: NEUROLOGY | Facility: CLINIC | Age: 70
End: 2020-04-27
Payer: COMMERCIAL

## 2020-04-27 DIAGNOSIS — R51.9 NONINTRACTABLE HEADACHE, UNSPECIFIED CHRONICITY PATTERN, UNSPECIFIED HEADACHE TYPE: Primary | ICD-10-CM

## 2020-04-27 PROCEDURE — 99204 OFFICE O/P NEW MOD 45 MIN: CPT | Mod: 95 | Performed by: PSYCHIATRY & NEUROLOGY

## 2020-04-27 NOTE — PROGRESS NOTES
Request for Consent:   Video Encounter   Misael, my name is Mora Sharma MD.  Before we proceed, can you please verify your identification by telling me your full name and date of birth?  Correct Can you tell me who is in the room with you? No one    You and I are about to have a telemedicine check-in or visit because you have requested it.  This is a live video-conference.  I am a real person, speaking to you in real time.  There is no one else with me on the video-conference.  However, when we use ([employer], [employer], etc) it is important for you to know that the video-conference may not be secure or private.  I am not recording this conversation and I am asking you not to record it.  This telemedicine visit will be billed to your health insurance or you, if you are self-insured.  You understand you will be responsible for any copayments or coinsurances that apply to your telemedicine visit.  Before starting our telemedicine visit, I am required to get your consent for this virtual check-in or visit by telemedicine. Do you consent?    Patient Response to Request for Consent:  Yes    Patient ID: Fanny Reddy                              : 1950  MRN: [id number]                                            VISIT DATE: 2020  ENCOUNTER PROVIDER: Mora Sharma  REFERRING PROVIDER: No ref. provider found    CHIEF COMPLAINT: Headache    HISTORY OF PRESENT ILLNESS:  Fanny Reddy is a 70 year old woman with PMH DMII, HTN, HLD who presents for evaluation of headache.Since February she has been having dull headaches over the temples, especially the right temple. They weren't everyday, but more days than not. She is still getting these headaches, but the other day the headache was in the left temple for the first time. The pain may last for a few days. It is 3/10 in severity. She thought maybe the pain was related to the rain when it occurred on the left. The right temple pain keeps coming back. It  happens at random during the day. She tried Excedrin which has helped. She is a  and thought maybe it's related to working on the computer. She has not had any vision changes; no blurred vision, darkening of vision, double vision. She had a cataract removed on the right, she is being watched for glaucoma. No temple tenderness. No photophobia, phonophobia, nausea. When she is busy she doesn't notice the pain. She has no prior history of headaches.    No change in medications in 10 years.  She does have sweats at night, she will take the sheets off and then put them back and she is fine.  No jaw claudication.   She has not taken naproxen in last 6 weeks for arthritis. The arthritis is mostly in her knees. She had frozen shoulder in Jan, improved    She had MONICA 10 years ago, she was taken off CPAP due to normal sleep study.    PAST MEDICAL HISTORY:  has a past medical history of Type 2 diabetes mellitus (CMS/[facility]). HTN, HLD    PAST SURGICAL HISTORY: Hysterectomy    SOCIAL HISTORY:  reports that she has never smoked. She has never used smokeless tobacco. She reports that she drinks alcohol. She reports that she does not use drugs. Social etoh. No caffeine    FAMILY HISTORY: No FH of migraines or neurologic disease    MEDICATIONS:   Current Outpatient Medications:   •  aspirin (ASPIR-LOW) 81 mg enteric coated tablet, Take 1 tablet by mouth daily., Disp: , Rfl:   •  blood sugar diagnostic (glucose blood) strip, 1 application daily., Disp: 100 strip, Rfl: 6  •  blood-glucose meter (ONETOUCH VERIO FLEX START) kit, Use as instructed, Disp: 1 each, Rfl: 0  •  diclofenac sodium (VOLTAREN) 1 % topical gel, APPLY TO AFFECTED AREA(S) TWO TIMES A DAY, Disp: 100 g, Rfl: 1  •  diclofenac sodium (VOLTAREN) 1 % topical gel, APPLY TO AFFECTED AREA(S) TWO TIMES A DAY, Disp: 100 g, Rfl: 1  •  lancets 31 gauge misc, 1 application daily., Disp: 100 each, Rfl: 3  •  lisinopriL-hydrochlorothiazide (PRINZIDE,ZESTORETIC) 10-12.5 mg  per tablet, Take 1 tablet by mouth once daily., Disp: 90 tablet, Rfl: 1  •  metFORMIN (GLUCOPHAGE) 500 mg tablet, Take 2 tablets (1,000 mg total) by mouth 2 (two) times a day with meals., Disp: 360 tablet, Rfl: 1  •  naproxen (NAPROSYN) 500 mg tablet, TAKE ONE TABLET BY MOUTH TWICE A DAY WITH MEALS, Disp: 60 tablet, Rfl: 1  •  omeprazole (PriLOSEC) 40 mg capsule, Take 1 capsule (40 mg total) by mouth once daily., Disp: 90 capsule, Rfl: 1  •  rosuvastatin (CRESTOR) 20 mg tablet, TAKE ONE TABLET BY MOUTH EVERY DAY, Disp: 90 tablet, Rfl: 1  •  triamcinolone (KENALOG) 0.1 % ointment, APPLY TO AFFECTED AREA TWO TIMES A DAY, Disp: 30 g, Rfl: 1  •  valACYclovir (VALTREX) 500 mg tablet, Take 1 tablet (500 mg total) by mouth daily., Disp: 90 tablet, Rfl: 1    ALLERGIES: has No Known Allergies.     REVIEW OF SYSTEMS:  All other systems reviewed and negative except as noted in the HPI.    Exam limited through telemedicine  PHYSICAL EXAM:  There were no vitals taken for this visit.  GENERAL APPEARANCE: Well appearing, well nourished and well developed.  Not in acute distress.  Appears stated age.  HEAD: Normocephalic, atraumatic.  EYES:  Sclerae white. Conjunctivae clear.  NECK:  Neck was supple.  EXTREMITIES: No clubbing, no cyanosis nor edema.  SKIN:  Dry, intact. No rashes noted.  PSYCHIATRIC: Calm and cooperative with appropriate insight    NEUROLOGICAL EXAM:  MENTAL STATUS: Awake and alert with fluent language. Attention, memory, and executive function were intact.  CRANIAL NERVES:  Extraocular movements are intact. Normal pursuits and saccades. No nystagmus. Face is symmetric. Hearing grossly intact. Uvula and tongue are midline. No dysarthria.  MOTOR:  Normal muscle bulk. No pronator drift. Moves arms and legs equally  COORDINATION: No ataxia on finger-to-nose  GAIT: Normal based steady gait.     LABORATORY STUDIES:  A1c 8.3    IMAGING STUDIES:   No CNS imaging    IMPRESSION:  Fanny Reddy is a 70 year old woman  with PMH DMII, HTN, HLD who presents for evaluation of temporal headaches (R>>L) for the past couple of months. She denies any vision changes. She denies temporal tenderness or jaw claudication. She does endorse night sweats. Given her age and location of headache, I explained possibility of temporal arteritis and that this needs to be ruled out. Other considerations could be fluctuations in glucose or BP, or tension headache.    RECOMMENDATIONS:  -Obtain ESR, CRP. She has a routine appt scheduled with her ophthalmologist in 2 days. She will discuss the consideration of GCA with him as well. Await results of fundoscopic exam.  Discussed that pending results of ESR/CRP may need referral for TA biopsy. Will hold off on empiric steroids given lack of any vision symptoms at all and diabetes (A1c 8.3)  -Advised to check glucose and BP when the headaches occur  -Consider MRI brain pending above  -Stay hydrated, maintain regular sleep and meal schedules    Follow-up will be scheduled pending above tests    Thank you for allowing me to participate in the care of your patient.  Please feel free to contact me at any time if you have any further questions.    Mora Sharma MD

## 2020-04-27 NOTE — LETTER
Request for Consent:   Video Encounter   Misael, my name is Mora Sharma MD.  Before we proceed, can you please verify your identification by telling me your full name and date of birth?  Correct Can you tell me who is in the room with you? No one     You and I are about to have a telemedicine check-in or visit because you have requested it.  This is a live video-conference.  I am a real person, speaking to you in real time.  There is no one else with me on the video-conference.  However, when we use ([employer], [employer], etc) it is important for you to know that the video-conference may not be secure or private.  I am not recording this conversation and I am asking you not to record it.  This telemedicine visit will be billed to your health insurance or you, if you are self-insured.  You understand you will be responsible for any copayments or coinsurances that apply to your telemedicine visit.  Before starting our telemedicine visit, I am required to get your consent for this virtual check-in or visit by telemedicine. Do you consent?     Patient Response to Request for Consent:  Yes     Patient ID: Fanny Reddy                              : 1950  MRN: [id number]                                             VISIT DATE: 2020  ENCOUNTER PROVIDER: Mroa Sharma  REFERRING PROVIDER: No ref. provider found     CHIEF COMPLAINT: Headache     HISTORY OF PRESENT ILLNESS:  Fanny Reddy is a 70 year old woman with PMH DMII, HTN, HLD who presents for evaluation of headache.Since February she has been having dull headaches over the temples, especially the right temple. They weren't everyday, but more days than not. She is still getting these headaches, but the other day the headache was in the left temple for the first time. The pain may last for a few days. It is 3/10 in severity. She thought maybe the pain was related to the rain when it occurred on the left. The right temple pain keeps coming back.  It happens at random during the day. She tried Excedrin which has helped. She is a  and thought maybe it's related to working on the computer. She has not had any vision changes; no blurred vision, darkening of vision, double vision. She had a cataract removed on the right, she is being watched for glaucoma. No temple tenderness. No photophobia, phonophobia, nausea. When she is busy she doesn't notice the pain. She has no prior history of headaches.     No change in medications in 10 years.  She does have sweats at night, she will take the sheets off and then put them back and she is fine.  No jaw claudication.   She has not taken naproxen in last 6 weeks for arthritis. The arthritis is mostly in her knees. She had frozen shoulder in Jan, improved     She had MONICA 10 years ago, she was taken off CPAP due to normal sleep study.     PAST MEDICAL HISTORY:  has a past medical history of Type 2 diabetes mellitus (CMS/[facility]). HTN, HLD     PAST SURGICAL HISTORY: Hysterectomy     SOCIAL HISTORY:  reports that she has never smoked. She has never used smokeless tobacco. She reports that she drinks alcohol. She reports that she does not use drugs. Social etoh. No caffeine     FAMILY HISTORY: No FH of migraines or neurologic disease     MEDICATIONS:   Current Outpatient Medications:   •  aspirin (ASPIR-LOW) 81 mg enteric coated tablet, Take 1 tablet by mouth daily., Disp: , Rfl:   •  blood sugar diagnostic (glucose blood) strip, 1 application daily., Disp: 100 strip, Rfl: 6  •  blood-glucose meter (ONETOUCH VERIO FLEX START) kit, Use as instructed, Disp: 1 each, Rfl: 0  •  diclofenac sodium (VOLTAREN) 1 % topical gel, APPLY TO AFFECTED AREA(S) TWO TIMES A DAY, Disp: 100 g, Rfl: 1  •  diclofenac sodium (VOLTAREN) 1 % topical gel, APPLY TO AFFECTED AREA(S) TWO TIMES A DAY, Disp: 100 g, Rfl: 1  •  lancets 31 gauge misc, 1 application daily., Disp: 100 each, Rfl: 3  •  lisinopriL-hydrochlorothiazide (PRINZIDE,ZESTORETIC)  10-12.5 mg per tablet, Take 1 tablet by mouth once daily., Disp: 90 tablet, Rfl: 1  •  metFORMIN (GLUCOPHAGE) 500 mg tablet, Take 2 tablets (1,000 mg total) by mouth 2 (two) times a day with meals., Disp: 360 tablet, Rfl: 1  •  naproxen (NAPROSYN) 500 mg tablet, TAKE ONE TABLET BY MOUTH TWICE A DAY WITH MEALS, Disp: 60 tablet, Rfl: 1  •  omeprazole (PriLOSEC) 40 mg capsule, Take 1 capsule (40 mg total) by mouth once daily., Disp: 90 capsule, Rfl: 1  •  rosuvastatin (CRESTOR) 20 mg tablet, TAKE ONE TABLET BY MOUTH EVERY DAY, Disp: 90 tablet, Rfl: 1  •  triamcinolone (KENALOG) 0.1 % ointment, APPLY TO AFFECTED AREA TWO TIMES A DAY, Disp: 30 g, Rfl: 1  •  valACYclovir (VALTREX) 500 mg tablet, Take 1 tablet (500 mg total) by mouth daily., Disp: 90 tablet, Rfl: 1     ALLERGIES: has No Known Allergies.      REVIEW OF SYSTEMS:  All other systems reviewed and negative except as noted in the HPI.     Exam limited through telemedicine  PHYSICAL EXAM:  There were no vitals taken for this visit.  GENERAL APPEARANCE: Well appearing, well nourished and well developed.  Not in acute distress.  Appears stated age.  HEAD: Normocephalic, atraumatic.  EYES:  Sclerae white. Conjunctivae clear.  NECK:  Neck was supple.  EXTREMITIES: No clubbing, no cyanosis nor edema.  SKIN:  Dry, intact. No rashes noted.  PSYCHIATRIC: Calm and cooperative with appropriate insight     NEUROLOGICAL EXAM:  MENTAL STATUS: Awake and alert with fluent language. Attention, memory, and executive function were intact.  CRANIAL NERVES:  Extraocular movements are intact. Normal pursuits and saccades. No nystagmus. Face is symmetric. Hearing grossly intact. Uvula and tongue are midline. No dysarthria.  MOTOR:  Normal muscle bulk. No pronator drift. Moves arms and legs equally  COORDINATION: No ataxia on finger-to-nose  GAIT: Normal based steady gait.      LABORATORY STUDIES:  A1c 8.3     IMAGING STUDIES:   No CNS imaging     IMPRESSION:  Fanny Reddy is a  70 year old woman with PMH DMII, HTN, HLD who presents for evaluation of temporal headaches (R>>L) for the past couple of months. She denies any vision changes. She denies temporal tenderness or jaw claudication. She does endorse night sweats. Given her age and location of headache, I explained possibility of temporal arteritis and that this needs to be ruled out. Other considerations could be fluctuations in glucose or BP, or tension headache.     RECOMMENDATIONS:  -Obtain ESR, CRP. She has a routine appt scheduled with her ophthalmologist in 2 days. She will discuss the consideration of GCA with him as well. Await results of fundoscopic exam.  Discussed that pending results of ESR/CRP may need referral for TA biopsy. Will hold off on empiric steroids given lack of any vision symptoms at all and diabetes (A1c 8.3)  -Advised to check glucose and BP when the headaches occur  -Consider MRI brain pending above  -Stay hydrated, maintain regular sleep and meal schedules     Follow-up will be scheduled pending above tests     Thank you for allowing me to participate in the care of your patient.  Please feel free to contact me at any time if you have any further questions.     Mora Sharma MD

## 2020-04-30 DIAGNOSIS — R51.9 FACIAL PAIN: Primary | ICD-10-CM

## 2020-04-30 RX ORDER — LISINOPRIL AND HYDROCHLOROTHIAZIDE 10; 12.5 MG/1; MG/1
TABLET ORAL
Qty: 90 TABLET | Refills: 1 | Status: SHIPPED | OUTPATIENT
Start: 2020-04-30 | End: 2020-05-01

## 2020-05-01 RX ORDER — LISINOPRIL AND HYDROCHLOROTHIAZIDE 10; 12.5 MG/1; MG/1
TABLET ORAL
Qty: 90 TABLET | Refills: 1 | Status: SHIPPED | OUTPATIENT
Start: 2020-05-01 | End: 2020-05-02 | Stop reason: SDUPTHER

## 2020-05-02 RX ORDER — LISINOPRIL AND HYDROCHLOROTHIAZIDE 10; 12.5 MG/1; MG/1
TABLET ORAL
Qty: 90 TABLET | Refills: 1 | Status: SHIPPED | OUTPATIENT
Start: 2020-05-02 | End: 2020-05-04

## 2020-05-04 RX ORDER — LISINOPRIL AND HYDROCHLOROTHIAZIDE 10; 12.5 MG/1; MG/1
TABLET ORAL
Qty: 90 TABLET | Refills: 1 | Status: SHIPPED | OUTPATIENT
Start: 2020-05-04 | End: 2021-03-04 | Stop reason: SDUPTHER

## 2020-05-05 RX ORDER — OMEPRAZOLE 40 MG/1
40 CAPSULE, DELAYED RELEASE ORAL
Qty: 90 CAPSULE | Refills: 1 | Status: SHIPPED | OUTPATIENT
Start: 2020-05-05 | End: 2020-05-06

## 2020-05-05 RX ORDER — NAPROXEN 500 MG/1
TABLET ORAL
Qty: 60 TABLET | Refills: 1 | Status: SHIPPED | OUTPATIENT
Start: 2020-05-05 | End: 2020-11-07 | Stop reason: SDUPTHER

## 2020-05-06 RX ORDER — OMEPRAZOLE 40 MG/1
CAPSULE, DELAYED RELEASE ORAL
Qty: 90 CAPSULE | Refills: 1 | Status: SHIPPED | OUTPATIENT
Start: 2020-05-06 | End: 2020-10-28

## 2020-05-08 LAB
CRP SERPL-MCNC: 2 MG/L (ref 0–10)
ERYTHROCYTE [SEDIMENTATION RATE] IN BLOOD BY WESTERGREN METHOD: 11 MM/HR (ref 0–40)

## 2020-05-11 ENCOUNTER — TELEPHONE (OUTPATIENT)
Dept: NEUROLOGY | Facility: CLINIC | Age: 70
End: 2020-05-11

## 2020-05-11 NOTE — TELEPHONE ENCOUNTER
I called patient. ESR, CRP normal. Headaches actually resolved! She will monitor and let me know if they recur

## 2020-05-26 ENCOUNTER — TELEPHONE (OUTPATIENT)
Dept: FAMILY MEDICINE | Facility: CLINIC | Age: 70
End: 2020-05-26

## 2020-05-26 NOTE — TELEPHONE ENCOUNTER
----- Message from Choco Hung, DO sent at 5/24/2020  7:18 PM EDT -----  I was unsure who to send this to, on Tuesday can we send the pt the letter I wrote for her today? She should have access on the chart but is also asking for a hard copy. Thank you!

## 2020-06-05 ENCOUNTER — TELEPHONE (OUTPATIENT)
Dept: FAMILY MEDICINE | Facility: CLINIC | Age: 70
End: 2020-06-05

## 2020-06-05 NOTE — TELEPHONE ENCOUNTER
LVM to schedule patient for AWV. Last AWV back in April was cancelled. Recent labs are up to date to review A1c levels. Please schedule when patient calls back. (High Risk Diabetic)

## 2020-06-25 DIAGNOSIS — E11.65 TYPE 2 DIABETES MELLITUS WITH HYPERGLYCEMIA, WITHOUT LONG-TERM CURRENT USE OF INSULIN (CMS/HCC): Primary | ICD-10-CM

## 2020-06-25 PROBLEM — E11.22 DM TYPE 2 CAUSING CKD STAGE 3 (CMS/HCC): Status: RESOLVED | Noted: 2020-02-21 | Resolved: 2020-06-25

## 2020-06-25 PROBLEM — N18.30 DM TYPE 2 CAUSING CKD STAGE 3 (CMS/HCC): Status: RESOLVED | Noted: 2020-02-21 | Resolved: 2020-06-25

## 2020-06-25 RX ORDER — DAPAGLIFLOZIN 5 MG/1
5 TABLET, FILM COATED ORAL DAILY
Qty: 30 TABLET | Refills: 1 | Status: SHIPPED | OUTPATIENT
Start: 2020-06-25 | End: 2020-07-09

## 2020-06-25 NOTE — ASSESSMENT & PLAN NOTE
HgA1c 8.2%.  FBG in the low 300's on Metformin 1000 mg BID.  No cormobidities to guide therapy other than obesity    -Start Dapaglifozin 5 mg QD  -C/w Metformin 1000 mg BID  -Log AM FBG and bring to next appointment  -May benefit from GLP-1 agonist if BG remains uncontrolled  -Consider DM Education referral

## 2020-06-28 LAB
ALBUMIN/CREAT UR: 4 MG/G CREAT (ref 0–29)
CHOLEST SERPL-MCNC: 135 MG/DL (ref 100–199)
CREAT UR-MCNC: 85.4 MG/DL
HBA1C MFR BLD: 14 % (ref 4.8–5.6)
HDLC SERPL-MCNC: 36 MG/DL
LDLC SERPL CALC-MCNC: 67 MG/DL (ref 0–99)
MICROALBUMIN UR-MCNC: 3.6 UG/ML
TRIGL SERPL-MCNC: 158 MG/DL (ref 0–149)
VLDLC SERPL CALC-MCNC: 32 MG/DL (ref 5–40)

## 2020-07-10 ENCOUNTER — OFFICE VISIT (OUTPATIENT)
Dept: FAMILY MEDICINE | Facility: CLINIC | Age: 70
End: 2020-07-10
Payer: COMMERCIAL

## 2020-07-10 VITALS
BODY MASS INDEX: 37.25 KG/M2 | OXYGEN SATURATION: 97 % | SYSTOLIC BLOOD PRESSURE: 122 MMHG | HEART RATE: 82 BPM | WEIGHT: 202.4 LBS | TEMPERATURE: 96.8 F | HEIGHT: 62 IN | DIASTOLIC BLOOD PRESSURE: 78 MMHG

## 2020-07-10 DIAGNOSIS — E78.2 MULTIPLE-TYPE HYPERLIPIDEMIA: ICD-10-CM

## 2020-07-10 DIAGNOSIS — I10 BENIGN ESSENTIAL HYPERTENSION: ICD-10-CM

## 2020-07-10 DIAGNOSIS — E55.9 VITAMIN D DEFICIENCY: ICD-10-CM

## 2020-07-10 DIAGNOSIS — J34.89 SINUS PRESSURE: ICD-10-CM

## 2020-07-10 DIAGNOSIS — Z00.00 ROUTINE GENERAL MEDICAL EXAMINATION AT A HEALTH CARE FACILITY: Primary | ICD-10-CM

## 2020-07-10 DIAGNOSIS — E11.65 TYPE 2 DIABETES MELLITUS WITH HYPERGLYCEMIA, WITHOUT LONG-TERM CURRENT USE OF INSULIN (CMS/HCC): ICD-10-CM

## 2020-07-10 PROBLEM — L02.11 CUTANEOUS ABSCESS OF NECK: Status: RESOLVED | Noted: 2019-02-07 | Resolved: 2020-07-10

## 2020-07-10 LAB
EXPIRATION DATE: NORMAL
GLUCOSE BLOOD, POC: 128
Lab: NORMAL
POCT MANUFACTURER: NORMAL

## 2020-07-10 PROCEDURE — G0438 PPPS, INITIAL VISIT: HCPCS | Performed by: FAMILY MEDICINE

## 2020-07-10 PROCEDURE — 82962 GLUCOSE BLOOD TEST: CPT | Performed by: FAMILY MEDICINE

## 2020-07-10 RX ORDER — FLUTICASONE PROPIONATE 50 MCG
1 SPRAY, SUSPENSION (ML) NASAL DAILY
Qty: 1 BOTTLE | Refills: 11 | Status: SHIPPED | OUTPATIENT
Start: 2020-07-10 | End: 2020-12-14

## 2020-07-10 ASSESSMENT — MINI COG
TOTAL SCORE: 5
COMPLETED: YES

## 2020-07-10 NOTE — PROGRESS NOTES
Subjective     Fanny Reddy is a 70 y.o. female who presents for an initial annual wellness visit.     Patient Care Team:  Lupe Herman DO as PCP - General (Family Medicine)  Lupe Herman DO    Comprehensive Medical and Social History  Patient Active Problem List   Diagnosis   • Benign essential hypertension   • Type 2 diabetes mellitus with hyperglycemia (CMS/HCC)   • Multiple-type hyperlipidemia   • Vitamin D deficiency   • BMI 39.0-39.9,adult   • Routine general medical examination at a health care facility     Past Medical History:   Diagnosis Date   • Cutaneous abscess of neck 2/7/2019   • Type 2 diabetes mellitus (CMS/HCC)      No past surgical history on file.  No Known Allergies  Current Outpatient Medications   Medication Sig Dispense Refill   • aspirin (ASPIR-LOW) 81 mg enteric coated tablet Take 1 tablet by mouth daily.     • blood sugar diagnostic (glucose blood) strip 1 application daily. 100 strip 6   • blood-glucose meter (ONETOUCH VERIO FLEX START) kit Use as instructed 1 each 0   • dapagliflozin (FARXIGA) 10 mg tablet Take 1 tablet (10 mg total) by mouth daily. 90 tablet 1   • diclofenac sodium (VOLTAREN) 1 % topical gel APPLY TO AFFECTED AREA(S) TWO TIMES A  g 1   • diclofenac sodium (VOLTAREN) 1 % topical gel APPLY TO AFFECTED AREA(S) TWO TIMES A  g 1   • fluticasone propionate (FLONASE) 50 mcg/actuation nasal spray Administer 1 spray into each nostril daily. 1 Bottle 11   • lancets 31 gauge misc 1 application daily. 100 each 3   • lisinopriL-hydrochlorothiazide (PRINZIDE,ZESTORETIC) 10-12.5 mg per tablet TAKE ONE TABLET BY MOUTH EVERY DAY 90 tablet 1   • metFORMIN (GLUCOPHAGE) 500 mg tablet Take 2 tablets (1,000 mg total) by mouth 2 (two) times a day with meals. 360 tablet 1   • naproxen (NAPROSYN) 500 mg tablet TAKE ONE TABLET BY MOUTH TWICE A DAY WITH MEALS 60 tablet 1   • omeprazole (PriLOSEC) 40 mg capsule TAKE ONE CAPSULE BY MOUTH EVERY DAY 90 capsule 1   •  "rosuvastatin (CRESTOR) 20 mg tablet TAKE ONE TABLET BY MOUTH EVERY DAY 90 tablet 1   • triamcinolone (KENALOG) 0.1 % ointment APPLY TO AFFECTED AREA TWO TIMES A DAY 30 g 1   • valACYclovir (VALTREX) 500 mg tablet Take 1 tablet (500 mg total) by mouth daily. 90 tablet 1     No current facility-administered medications for this visit.      Social History     Tobacco Use   • Smoking status: Never Smoker   • Smokeless tobacco: Never Used   Substance Use Topics   • Alcohol use: Yes     Comment: socially at [email or web address] events   • Drug use: No     No family history on file.    Objective   Vitals  Vitals:    07/10/20 0906   BP: 122/78   Pulse: 82   Temp: (!) 36 °C (96.8 °F)   SpO2: 97%   Weight: 91.8 kg (202 lb 6.4 oz)   Height: 1.575 m (5' 2\")     Body mass index is 37.02 kg/m².    Advanced Care Plan  Does patient have advance directive?: No                                     PHQ  Have You Felt Down, Depressed or Hopeless?: no  Have You Felt Little Interest or Pleasure in Doing Things?: no                                              Mini Cog  Completed: Yes  Score: 5  Result: Negative    Get Up and Go  Result: Pass      STEADI Falls Risk  One or more falls in the last year: No           Has trouble stepping up onto a curb: No   Advised to use a cane or walker to get around safely: Yes   Often has to rush to the toilet: Yes   Feels unsteady when walking: Yes   Has lost some feeling in feet: No   Often feels sad or depressed: No   Steadies self on furniture while walking at home: Yes   Takes medication that makes him/her feel lightheaded or more tired than usual: No   Worried about falling: Yes   Takes medicine to sleep or improve mood: No   Needs to push with hands when rising from a chair: No   Falls screen completed: Yes     Hearing and Vision Screening  No exam data present  See HRA for relevant hearing screening response.      Assessment/Plan   Diagnoses and all orders for this visit:    Routine general medical " examination at a health care facility (Primary)    Benign essential hypertension    BMI 39.0-39.9,adult    Multiple-type hyperlipidemia    Type 2 diabetes mellitus with hyperglycemia, without long-term current use of insulin (CMS/[facility])  -     POCT glucose    Vitamin D deficiency    Sinus pressure  -     fluticasone propionate (FLONASE) 50 mcg/actuation nasal spray; Administer 1 spray into each nostril daily.      Her bp is stable right now   I asked her to transition slowly   radha use flonase and zyrtec for sinus pressure / post nasal drip   Cont statin   Cont vitamin D   Cont dual therapy for diabetes as her sugars have improved       See Patient Instructions (the written plan) which was given to the patient for PPPS and health risk factors with interventions.

## 2020-08-04 DIAGNOSIS — E11.65 TYPE 2 DIABETES MELLITUS WITH HYPERGLYCEMIA, WITHOUT LONG-TERM CURRENT USE OF INSULIN (CMS/HCC): Primary | ICD-10-CM

## 2020-08-12 DIAGNOSIS — I10 BENIGN ESSENTIAL HYPERTENSION: Primary | ICD-10-CM

## 2020-08-12 RX ORDER — ACETAMINOPHEN 500 MG
1 TABLET ORAL DAILY
Qty: 1 EACH | Refills: 0 | Status: SHIPPED | OUTPATIENT
Start: 2020-08-12 | End: 2020-12-14

## 2020-08-20 RX ORDER — METFORMIN HYDROCHLORIDE 500 MG/1
TABLET ORAL
Qty: 180 TABLET | Refills: 1 | Status: SHIPPED | OUTPATIENT
Start: 2020-08-20 | End: 2020-08-21

## 2020-08-21 RX ORDER — METFORMIN HYDROCHLORIDE 500 MG/1
TABLET ORAL
Qty: 180 TABLET | Refills: 1 | Status: SHIPPED | OUTPATIENT
Start: 2020-08-21 | End: 2020-12-08 | Stop reason: DRUGHIGH

## 2020-08-27 LAB
ALBUMIN SERPL-MCNC: 5.1 G/DL (ref 3.8–4.8)
ALBUMIN/GLOB SERPL: 2.3 {RATIO} (ref 1.2–2.2)
ALP SERPL-CCNC: 76 IU/L (ref 39–117)
ALT SERPL-CCNC: 22 IU/L (ref 0–32)
AST SERPL-CCNC: 22 IU/L (ref 0–40)
BILIRUB SERPL-MCNC: 0.5 MG/DL (ref 0–1.2)
BUN SERPL-MCNC: 16 MG/DL (ref 8–27)
BUN/CREAT SERPL: 18 (ref 12–28)
CALCIUM SERPL-MCNC: 10.1 MG/DL (ref 8.7–10.3)
CHLORIDE SERPL-SCNC: 98 MMOL/L (ref 96–106)
CHOLEST SERPL-MCNC: 145 MG/DL (ref 100–199)
CO2 SERPL-SCNC: 22 MMOL/L (ref 20–29)
CREAT SERPL-MCNC: 0.91 MG/DL (ref 0.57–1)
GLOBULIN SER CALC-MCNC: 2.2 G/DL (ref 1.5–4.5)
GLUCOSE SERPL-MCNC: 124 MG/DL (ref 65–99)
HBA1C MFR BLD: 8.1 % (ref 4.8–5.6)
HDLC SERPL-MCNC: 41 MG/DL
LAB CORP EGFR IF AFRICN AM: 74 ML/MIN/1.73
LAB CORP EGFR IF NONAFRICN AM: 65 ML/MIN/1.73
LDLC SERPL CALC-MCNC: 84 MG/DL (ref 0–99)
MICROALBUMIN UR-MCNC: 8.2 UG/ML
POTASSIUM SERPL-SCNC: 4.7 MMOL/L (ref 3.5–5.2)
PROT SERPL-MCNC: 7.3 G/DL (ref 6–8.5)
SODIUM SERPL-SCNC: 137 MMOL/L (ref 134–144)
TRIGL SERPL-MCNC: 102 MG/DL (ref 0–149)
VLDLC SERPL CALC-MCNC: 20 MG/DL (ref 5–40)

## 2020-09-01 ENCOUNTER — OFFICE VISIT (OUTPATIENT)
Dept: FAMILY MEDICINE | Facility: CLINIC | Age: 70
End: 2020-09-01
Payer: COMMERCIAL

## 2020-09-01 VITALS
TEMPERATURE: 97.2 F | HEART RATE: 87 BPM | HEIGHT: 62 IN | BODY MASS INDEX: 36.4 KG/M2 | DIASTOLIC BLOOD PRESSURE: 72 MMHG | WEIGHT: 197.8 LBS | SYSTOLIC BLOOD PRESSURE: 118 MMHG | OXYGEN SATURATION: 97 %

## 2020-09-01 DIAGNOSIS — E78.2 MULTIPLE-TYPE HYPERLIPIDEMIA: ICD-10-CM

## 2020-09-01 DIAGNOSIS — E11.65 TYPE 2 DIABETES MELLITUS WITH HYPERGLYCEMIA, WITHOUT LONG-TERM CURRENT USE OF INSULIN (CMS/HCC): Primary | ICD-10-CM

## 2020-09-01 DIAGNOSIS — I10 BENIGN ESSENTIAL HYPERTENSION: ICD-10-CM

## 2020-09-01 PROCEDURE — 99214 OFFICE O/P EST MOD 30 MIN: CPT | Performed by: FAMILY MEDICINE

## 2020-09-01 ASSESSMENT — ENCOUNTER SYMPTOMS
ABDOMINAL PAIN: 0
HEMATURIA: 0
NERVOUS/ANXIOUS: 0
FEVER: 0
BACK PAIN: 0
NAUSEA: 0
TROUBLE SWALLOWING: 0
FREQUENCY: 0
WHEEZING: 0
FATIGUE: 0
DIARRHEA: 0
COUGH: 0
DIZZINESS: 0
VOMITING: 0
SORE THROAT: 0
APPETITE CHANGE: 0
SHORTNESS OF BREATH: 0
EYE PAIN: 0
CONSTIPATION: 0
NUMBNESS: 0
ACTIVITY CHANGE: 0
ARTHRALGIAS: 0

## 2020-09-01 NOTE — PROGRESS NOTES
Subjective      Patient ID: Fanny Reddy is a 70 y.o. female.    HPI     Chronic conditions  dm2 - a1c improved from 14 to 8.1 in 2 months, eye appt oct with dr. dupont  Pt now counts carbs daily -has continued to be successful with weight loss as well  Am sugars   htn - stable, has stress test with dr. Seay this week      The following have been reviewed and updated as appropriate in this visit:  Allergies  Meds  Problems       Review of Systems   Constitutional: Negative for activity change, appetite change, fatigue and fever.   HENT: Negative for congestion, ear pain, postnasal drip, sore throat and trouble swallowing.    Eyes: Negative for pain.   Respiratory: Negative for cough, shortness of breath and wheezing.    Cardiovascular: Negative for chest pain.   Gastrointestinal: Negative for abdominal pain, constipation, diarrhea, nausea and vomiting.   Genitourinary: Negative for frequency and hematuria.   Musculoskeletal: Negative for arthralgias and back pain.   Skin: Negative for rash.   Neurological: Negative for dizziness and numbness.   Psychiatric/Behavioral: The patient is not nervous/anxious.        Social History     Socioeconomic History   • Marital status: Single     Spouse name: Not on file   • Number of children: Not on file   • Years of education: Not on file   • Highest education level: Not on file   Occupational History   • Not on file   Social Needs   • Financial resource strain: Not on file   • Food insecurity:     Worry: Not on file     Inability: Not on file   • Transportation needs:     Medical: Not on file     Non-medical: Not on file   Tobacco Use   • Smoking status: Never Smoker   • Smokeless tobacco: Never Used   Substance and Sexual Activity   • Alcohol use: Yes     Comment: socially at celebratory events   • Drug use: No   • Sexual activity: Never   Lifestyle   • Physical activity:     Days per week: Not on file     Minutes per session: Not on file   • Stress: Not on  "file   Relationships   • Social connections:     Talks on phone: Not on file     Gets together: Not on file     Attends [employer] service: Not on file     Active member of club or organization: Not on file     Attends meetings of clubs or organizations: Not on file     Relationship status: Not on file   • Intimate partner violence:     Fear of current or ex partner: Not on file     Emotionally abused: Not on file     Physically abused: Not on file     Forced sexual activity: Not on file   Other Topics Concern   • Not on file   Social History Narrative   • Not on file       Objective     Vitals:    09/01/20 1122   BP: 118/72   Pulse: 87   Temp: 36.2 °C (97.2 °F)   SpO2: 97%   Weight: 89.7 kg (197 lb 12.8 oz)   Height: 1.575 m (5' 2\")     Body mass index is 36.18 kg/m².    Physical Exam   Constitutional: She is oriented to person, place, and time. She appears well-developed and well-nourished.   HENT:   Head: Normocephalic and atraumatic.   Eyes: EOM are normal.   Neck: No thyromegaly present.   Cardiovascular: Normal rate, regular rhythm, normal heart sounds and intact distal pulses. Exam reveals no gallop and no friction rub.   No murmur heard.  Pulmonary/Chest: Effort normal and breath sounds normal. No stridor. No respiratory distress. She has no wheezes.   Neurological: She is alert and oriented to person, place, and time.   Skin: Skin is warm and dry. No rash noted. No erythema.   Psychiatric: She has a normal mood and affect. Her behavior is normal.       Assessment/Plan   Problem List Items Addressed This Visit        Circulatory    Benign essential hypertension     Stable, cont current regimen  Counseled low salt/sodium diet and increased cardiovascular exercise              Endocrine/Metabolic    Type 2 diabetes mellitus with hyperglycemia (CMS/[facility]) - Primary     a1c 8.1%  Cont same regimen - repeat in 2 months   On acei and statin  Cont carb counting  Recommended snap kitchen  Has ophtho exam oct with  " kiah         Relevant Orders    Hemoglobin A1c    Comprehensive metabolic panel    Hemoglobin A1c    Comprehensive metabolic panel    Multiple-type hyperlipidemia     Cont statin  Lab Results   Component Value Date    CHOL 145 08/26/2020    CHOL 135 06/27/2020    CHOL 150 09/23/2019     Lab Results   Component Value Date    HDL 41 08/26/2020    HDL 36 (L) 06/27/2020    HDL 52 09/23/2019     Lab Results   Component Value Date    LDLCALC 84 08/26/2020    LDLCALC 67 06/27/2020    LDLCALC 75 09/23/2019     Lab Results   Component Value Date    TRIG 102 08/26/2020    TRIG 158 (H) 06/27/2020    TRIG 117 09/23/2019     No results found for: CHOLHDL              Other    BMI 36.0-36.9,adult        utd colonoscopy  mammo due oct  [facility]

## 2020-09-01 NOTE — ASSESSMENT & PLAN NOTE
a1c 8.1%  Cont same regimen - repeat in 2 months   On acei and statin  Cont carb counting  Recommended snap kitchen  Has ophtho exam oct with dr. dupont

## 2020-09-01 NOTE — ASSESSMENT & PLAN NOTE
Cont statin  Lab Results   Component Value Date    CHOL 145 08/26/2020    CHOL 135 06/27/2020    CHOL 150 09/23/2019     Lab Results   Component Value Date    HDL 41 08/26/2020    HDL 36 (L) 06/27/2020    HDL 52 09/23/2019     Lab Results   Component Value Date    LDLCALC 84 08/26/2020    LDLCALC 67 06/27/2020    LDLCALC 75 09/23/2019     Lab Results   Component Value Date    TRIG 102 08/26/2020    TRIG 158 (H) 06/27/2020    TRIG 117 09/23/2019     No results found for: CHOLHDL

## 2020-09-01 NOTE — PROGRESS NOTES
"Subjective      Patient ID: Fanny Reddy is a 70 y.o. female.    HPI     10/29/2020 - dr dupont    The following have been reviewed and updated as appropriate in this visit:       Review of Systems    Social History     Socioeconomic History   • Marital status: Single     Spouse name: Not on file   • Number of children: Not on file   • Years of education: Not on file   • Highest education level: Not on file   Occupational History   • Not on file   Social Needs   • Financial resource strain: Not on file   • Food insecurity:     Worry: Not on file     Inability: Not on file   • Transportation needs:     Medical: Not on file     Non-medical: Not on file   Tobacco Use   • Smoking status: Never Smoker   • Smokeless tobacco: Never Used   Substance and Sexual Activity   • Alcohol use: Yes     Comment: socially at celebratory events   • Drug use: No   • Sexual activity: Never   Lifestyle   • Physical activity:     Days per week: Not on file     Minutes per session: Not on file   • Stress: Not on file   Relationships   • Social connections:     Talks on phone: Not on file     Gets together: Not on file     Attends [employer] service: Not on file     Active member of club or organization: Not on file     Attends meetings of clubs or organizations: Not on file     Relationship status: Not on file   • Intimate partner violence:     Fear of current or ex partner: Not on file     Emotionally abused: Not on file     Physically abused: Not on file     Forced sexual activity: Not on file   Other Topics Concern   • Not on file   Social History Narrative   • Not on file       Objective     Vitals:    09/01/20 1122   BP: 118/72   Pulse: 87   Temp: 36.2 °C (97.2 °F)   SpO2: 97%   Weight: 89.7 kg (197 lb 12.8 oz)   Height: 1.575 m (5' 2\")     Body mass index is 36.18 kg/m².    Physical Exam    Assessment/Plan   Problem List Items Addressed This Visit        Circulatory    Benign essential hypertension       Endocrine/Metabolic    " Type 2 diabetes mellitus with hyperglycemia (CMS/HCC) - Primary    Multiple-type hyperlipidemia       Other    BMI 36.0-36.9,adult

## 2020-09-10 ENCOUNTER — OFFICE VISIT (OUTPATIENT)
Dept: FAMILY MEDICINE | Facility: CLINIC | Age: 70
End: 2020-09-10
Payer: COMMERCIAL

## 2020-09-10 VITALS
HEART RATE: 82 BPM | WEIGHT: 196 LBS | SYSTOLIC BLOOD PRESSURE: 126 MMHG | OXYGEN SATURATION: 98 % | DIASTOLIC BLOOD PRESSURE: 86 MMHG | BODY MASS INDEX: 36.07 KG/M2 | TEMPERATURE: 97.2 F | HEIGHT: 62 IN

## 2020-09-10 DIAGNOSIS — R22.1 LOCALIZED SWELLING, MASS OR LUMP OF NECK: Primary | ICD-10-CM

## 2020-09-10 DIAGNOSIS — Z23 NEED FOR VACCINATION: ICD-10-CM

## 2020-09-10 PROCEDURE — 99213 OFFICE O/P EST LOW 20 MIN: CPT | Mod: GC,25 | Performed by: STUDENT IN AN ORGANIZED HEALTH CARE EDUCATION/TRAINING PROGRAM

## 2020-09-10 PROCEDURE — G0008 ADMIN INFLUENZA VIRUS VAC: HCPCS | Mod: GC | Performed by: STUDENT IN AN ORGANIZED HEALTH CARE EDUCATION/TRAINING PROGRAM

## 2020-09-10 PROCEDURE — 90694 VACC AIIV4 NO PRSRV 0.5ML IM: CPT | Mod: GC | Performed by: STUDENT IN AN ORGANIZED HEALTH CARE EDUCATION/TRAINING PROGRAM

## 2020-09-10 NOTE — PATIENT INSTRUCTIONS
Call [facility] [phone] for Radiology scheduling.    Follow up as scheduled.

## 2020-09-10 NOTE — PROGRESS NOTES
Pt aware to wait in office ten to fifteen minutes after administration for any immediate reactions.

## 2020-09-10 NOTE — PROGRESS NOTES
[facility]     CHIEF COMPLAINT   Fanny Reddy is a 70 y.o. female who presents to the office for evaluation of lump on right collar bone.      HISTORY OF PRESENT ILLNESS      Lump on right collar bone:  - noticed lump on right collar bone earlier this month.   - feels as though collar bone is more prominent on that side  - no previous injury in the area  - no pain, redness or warmth  - no overlying skin changes  - no changes since earlier this month    PAST MEDICAL AND SURGICAL HISTORY      PMHx:  Patient Active Problem List    Diagnosis Date Noted   • Localized swelling, mass or lump of neck 09/11/2020   • Routine general medical examination at a [facility] care facility 07/10/2020   • BMI 36.0-36.9,adult 03/13/2018   • Benign essential hypertension 01/19/2011   • Type 2 diabetes mellitus with hyperglycemia (CMS/[facility]) 01/19/2011   • Multiple-type hyperlipidemia 01/19/2011   • Vitamin D deficiency 01/19/2011     PSHx:  No past surgical history on file.  MEDICATIONS        Current Outpatient Medications:   •  aspirin (ASPIR-LOW) 81 mg enteric coated tablet, Take 1 tablet by mouth daily., Disp: , Rfl:   •  blood pressure monitor kit, 1 application daily. ICD 10: I10, Disp: 1 each, Rfl: 0  •  blood sugar diagnostic (glucose blood) strip, 1 application 2 (two) times a day., Disp: 200 strip, Rfl: 6  •  blood-glucose meter (ONETOUCH VERIO FLEX START) kit, Use as instructed, Disp: 1 each, Rfl: 0  •  dapagliflozin (FARXIGA) 10 mg tablet, Take 1 tablet (10 mg total) by mouth daily., Disp: 90 tablet, Rfl: 1  •  diclofenac sodium (VOLTAREN) 1 % topical gel, APPLY TO AFFECTED AREA(S) TWO TIMES A DAY, Disp: 100 g, Rfl: 1  •  diclofenac sodium (VOLTAREN) 1 % topical gel, APPLY TO AFFECTED AREA(S) TWO TIMES A DAY, Disp: 100 g, Rfl: 1  •  fluticasone propionate (FLONASE) 50 mcg/actuation nasal spray, Administer 1 spray into each nostril daily., Disp: 1 Bottle, Rfl: 11  •  lancets 31 gauge misc, 1 application  daily., Disp: 100 each, Rfl: 3  •  lisinopriL-hydrochlorothiazide (PRINZIDE,ZESTORETIC) 10-12.5 mg per tablet, TAKE ONE TABLET BY MOUTH EVERY DAY, Disp: 90 tablet, Rfl: 1  •  metFORMIN (GLUCOPHAGE) 500 mg tablet, TAKE ONE TABLET BY MOUTH TWICE A DAY WITH MEALS, Disp: 180 tablet, Rfl: 1  •  naproxen (NAPROSYN) 500 mg tablet, TAKE ONE TABLET BY MOUTH TWICE A DAY WITH MEALS, Disp: 60 tablet, Rfl: 1  •  omeprazole (PriLOSEC) 40 mg capsule, TAKE ONE CAPSULE BY MOUTH EVERY DAY, Disp: 90 capsule, Rfl: 1  •  rosuvastatin (CRESTOR) 20 mg tablet, TAKE ONE TABLET BY MOUTH EVERY DAY, Disp: 90 tablet, Rfl: 1  •  triamcinolone (KENALOG) 0.1 % ointment, APPLY TO AFFECTED AREA TWO TIMES A DAY, Disp: 30 g, Rfl: 1  •  valACYclovir (VALTREX) 500 mg tablet, Take 1 tablet (500 mg total) by mouth daily., Disp: 90 tablet, Rfl: 1  ALLERGIES      Patient has no known allergies.  FAMILY HISTORY      No family history on file.  SOCIAL HISTORY      Social History     Socioeconomic History   • Marital status: Single     Spouse name: None   • Number of children: None   • Years of education: None   • Highest education level: None   Occupational History   • None   Social Needs   • Financial resource strain: None   • Food insecurity:     Worry: None     Inability: None   • Transportation needs:     Medical: None     Non-medical: None   Tobacco Use   • Smoking status: Never Smoker   • Smokeless tobacco: Never Used   Substance and Sexual Activity   • Alcohol use: Yes     Comment: socially at celebratory events   • Drug use: No   • Sexual activity: Never   Lifestyle   • Physical activity:     Days per week: None     Minutes per session: None   • Stress: None   Relationships   • Social connections:     Talks on phone: None     Gets together: None     Attends [employer] service: None     Active member of club or organization: None     Attends meetings of clubs or organizations: None     Relationship status: None   • Intimate partner violence:     Fear of  "current or ex partner: None     Emotionally abused: None     Physically abused: None     Forced sexual activity: None   Other Topics Concern   • None   Social History Narrative   • None     REVIEW OF SYSTEMS      Review of Systems   Constitutional: Negative for chills and fever.   HENT: Negative for congestion and sore throat.    Respiratory: Negative for cough and shortness of breath.    Cardiovascular: Negative for chest pain.   Gastrointestinal: Negative for abdominal pain, nausea and vomiting.   Musculoskeletal: Negative for myalgias.   Neurological: Negative for dizziness, light-headedness and headaches.     PHYSICAL EXAMINATION      Visit Vitals  /86 (BP Location: Right upper arm, Patient Position: Sitting)   Pulse 82   Temp 36.2 °C (97.2 °F)   Ht 1.575 m (5' 2\")   Wt 88.9 kg (196 lb)   SpO2 98%   BMI 35.85 kg/m²     Body mass index is 35.85 kg/m².    Physical Exam   Constitutional: She is oriented to person, place, and time. She appears well-developed and well-nourished. No distress.   HENT:   Head: Normocephalic and atraumatic.   Right Ear: External ear normal.   Left Ear: External ear normal.   Nose: Nose normal.   Eyes: Conjunctivae are normal.   Neck: Normal range of motion. Neck supple.   Cardiovascular: Normal rate, regular rhythm and normal heart sounds. Exam reveals no gallop and no friction rub.   No murmur heard.  Pulmonary/Chest: Effort normal and breath sounds normal. No respiratory distress. She has no wheezes. She has no rales.   Neurological: She is alert and oriented to person, place, and time.   Skin: Skin is warm and dry.   The right side clavicle bone near sternoclavicular junction appears more prominent, no discrete mass palpated.    Psychiatric: She has a normal mood and affect. Her behavior is normal.     LABS / IMAGING / STUDIES      Lab Results   Component Value Date    CREATININE 0.91 08/26/2020    CREATININE 0.91 03/16/2020    CREATININE 0.9 02/18/2020     Lab Results "   Component Value Date    WBC 4.90 02/18/2020    HGB 12.4 02/18/2020    HCT 39.3 02/18/2020    MCV 79.1 (L) 02/18/2020     02/18/2020     Lab Results   Component Value Date    CHOL 145 08/26/2020    CHOL 135 06/27/2020    CHOL 150 09/23/2019    TRIG 102 08/26/2020    TRIG 158 (H) 06/27/2020    TRIG 117 09/23/2019    HDL 41 08/26/2020    HDL 36 (L) 06/27/2020    HDL 52 09/23/2019     Lab Results   Component Value Date    HGBA1C 8.1 (H) 08/26/2020    HGBA1C 14.0 (H) 06/27/2020    HGBA1C 8.3 (H) 03/16/2020     Lab Results   Component Value Date    MICROALBUR 8.2 08/26/2020           HEALTH MAINTENANCE   ...   ASSESSMENT AND PLAN   Problem List Items Addressed This Visit        Other    Localized swelling, mass or lump of neck - Primary     No discrete mass felt on clavicle at patient's area concern, although hard to say if there is some soft tissue prominence vs just a bony prominence of the clavicle near the sternoclavicular joint. Will obtain ultrasound to rule out any mass. If normal will reassure patient.          Relevant Orders    ULTRASOUND SOFT TISSUE HEAD/FACE/NECK      Other Visit Diagnoses     Need for vaccination        Relevant Orders    Influenza vaccine Quad Adjuvanted 65 and Older (FluAd Quad) (Completed)           Maria Victoria Carr DO  9/10/2020

## 2020-09-11 PROBLEM — R22.1 LOCALIZED SWELLING, MASS OR LUMP OF NECK: Status: ACTIVE | Noted: 2020-09-11

## 2020-09-11 ASSESSMENT — ENCOUNTER SYMPTOMS
DIZZINESS: 0
CHILLS: 0
MYALGIAS: 0
NAUSEA: 0
ABDOMINAL PAIN: 0
VOMITING: 0
COUGH: 0
LIGHT-HEADEDNESS: 0
SHORTNESS OF BREATH: 0
HEADACHES: 0
SORE THROAT: 0
FEVER: 0

## 2020-09-11 NOTE — ASSESSMENT & PLAN NOTE
No discrete mass felt on clavicle at patient's area concern, although hard to say if there is some soft tissue prominence vs just a bony prominence of the clavicle near the sternoclavicular joint. Will obtain ultrasound to rule out any mass. If normal will reassure patient.

## 2020-09-17 ENCOUNTER — HOSPITAL ENCOUNTER (OUTPATIENT)
Dept: RADIOLOGY | Facility: HOSPITAL | Age: 70
Discharge: HOME | End: 2020-09-17
Attending: STUDENT IN AN ORGANIZED HEALTH CARE EDUCATION/TRAINING PROGRAM
Payer: COMMERCIAL

## 2020-09-17 DIAGNOSIS — R22.1 LOCALIZED SWELLING, MASS OR LUMP OF NECK: ICD-10-CM

## 2020-09-17 PROCEDURE — 76536 US EXAM OF HEAD AND NECK: CPT

## 2020-09-18 RX ORDER — ROSUVASTATIN CALCIUM 20 MG/1
TABLET, COATED ORAL
Qty: 90 TABLET | Refills: 1 | Status: SHIPPED | OUTPATIENT
Start: 2020-09-18 | End: 2020-11-02 | Stop reason: SDUPTHER

## 2020-09-18 NOTE — RESULT ENCOUNTER NOTE
Hi Fanny    Your ultrasound showed no suspicious masses.  Likely bony prominence of your clavicle.   Have a great weekend,    Dr. Carr

## 2020-09-25 DIAGNOSIS — R22.1 LOCALIZED SWELLING, MASS OR LUMP OF NECK: Primary | ICD-10-CM

## 2020-09-25 NOTE — PROGRESS NOTES
US of soft tissue of neck for evaluation of right clavicular mass did not reveal suspicious mass. Per radiology reports MRI for further eval if clinically indicated. MRI soft tissue neck w/wo contrast ordered for further evaluation.

## 2020-09-28 RX ORDER — LANCETS
EACH MISCELLANEOUS
Qty: 100 EACH | Refills: 3 | Status: SHIPPED | OUTPATIENT
Start: 2020-09-28 | End: 2021-04-26

## 2020-10-05 ENCOUNTER — TELEPHONE (OUTPATIENT)
Dept: FAMILY MEDICINE | Facility: CLINIC | Age: 70
End: 2020-10-05

## 2020-10-05 DIAGNOSIS — R22.1 LOCALIZED SWELLING, MASS OR LUMP OF NECK: Primary | ICD-10-CM

## 2020-10-05 NOTE — TELEPHONE ENCOUNTER
MRI Neck Soft Tissue Pre Cert was submitted through [employer] portal on 10/1/2020. Case is still pending review.    Notified patient that I will follow back up with her once status is updated through [employer] portal.

## 2020-10-07 RX ORDER — VALACYCLOVIR HYDROCHLORIDE 500 MG/1
500 TABLET, FILM COATED ORAL DAILY
Qty: 90 TABLET | Refills: 1 | Status: SHIPPED | OUTPATIENT
Start: 2020-10-07 | End: 2021-04-29 | Stop reason: SDUPTHER

## 2020-10-12 NOTE — TELEPHONE ENCOUNTER
Patient has been contacted and notified that Pre Auth was denied. Patient has been made aware that Xray of clavicle was ordered and will call to schedule.

## 2020-10-12 NOTE — TELEPHONE ENCOUNTER
Prior auth for MRI Neck Soft Tissue has been denied. See statement below.    Based on Medicare National Coverage Determinations (NCD): 220.2 Magnetic Resonance Imaging and [employer] Musculoskeletal Imaging Guidelines Section: [place] 10.2 Lesion of Bone, we cannot approve this request. Your records show that you may have a problem in the growth or structure of a bone in your body. The reason this request cannot be approved is because: 1. Guidelines support further imaging when results of plain x-rays reveal an osteochondroma with clinical concern of malignant transformation. The clinical information provided does not meet these criteria and, therefore, the request is not indicated at this time. 2. Guidelines support imaging in the evaluation of a bone lesion following complete radiograph of the entire bone containing the lesion when the diagnosis is uncertain based on plain x-ray appearance or the imaging is requested for preoperative planning. The clinical information provided does not meet these criteria and, therefore, the request is not indicated at this time.    How would you like to proceed?

## 2020-10-12 NOTE — TELEPHONE ENCOUNTER
Okay. We can proceed with plain x-rays of the right clavicle. I placed order for the x-rays. Thank you.

## 2020-10-13 ENCOUNTER — HOSPITAL ENCOUNTER (OUTPATIENT)
Dept: RADIOLOGY | Facility: HOSPITAL | Age: 70
Discharge: HOME | End: 2020-10-13
Attending: STUDENT IN AN ORGANIZED HEALTH CARE EDUCATION/TRAINING PROGRAM
Payer: COMMERCIAL

## 2020-10-13 DIAGNOSIS — R22.1 LOCALIZED SWELLING, MASS OR LUMP OF NECK: ICD-10-CM

## 2020-10-13 PROCEDURE — 73000 X-RAY EXAM OF COLLAR BONE: CPT | Mod: RT

## 2020-10-15 ENCOUNTER — TELEPHONE (OUTPATIENT)
Dept: FAMILY MEDICINE | Facility: CLINIC | Age: 70
End: 2020-10-15

## 2020-10-15 NOTE — TELEPHONE ENCOUNTER
Contacted patient to discuss xray right clavicle results. No abnormal findings at her area of concern. Discussed we will submit again for pre-authorization of MRI to further evaluate her lump over right clavicular region. Pt voiced understanding and agreed with plan.

## 2020-10-15 NOTE — TELEPHONE ENCOUNTER
Amanda, can you reach out to [employer] and see if peer to peer can be done for MRI Neck, soft tissue that was previously denied.     If it's too late to do peer to peer for that case, please submit pre-cert again and if denied, Dr. Carr would like to do peer to peer.

## 2020-10-16 ENCOUNTER — TELEPHONE (OUTPATIENT)
Dept: FAMILY MEDICINE | Facility: CLINIC | Age: 70
End: 2020-10-16

## 2020-10-25 ENCOUNTER — HOSPITAL ENCOUNTER (EMERGENCY)
Facility: HOSPITAL | Age: 70
Discharge: HOME | End: 2020-10-25
Attending: EMERGENCY MEDICINE
Payer: COMMERCIAL

## 2020-10-25 ENCOUNTER — APPOINTMENT (EMERGENCY)
Dept: RADIOLOGY | Facility: HOSPITAL | Age: 70
End: 2020-10-25
Attending: EMERGENCY MEDICINE
Payer: COMMERCIAL

## 2020-10-25 VITALS
RESPIRATION RATE: 22 BRPM | SYSTOLIC BLOOD PRESSURE: 123 MMHG | HEART RATE: 85 BPM | WEIGHT: 191 LBS | HEIGHT: 62 IN | DIASTOLIC BLOOD PRESSURE: 71 MMHG | OXYGEN SATURATION: 97 % | BODY MASS INDEX: 35.15 KG/M2 | TEMPERATURE: 97.3 F

## 2020-10-25 DIAGNOSIS — S63.501A SPRAIN OF RIGHT WRIST, INITIAL ENCOUNTER: ICD-10-CM

## 2020-10-25 DIAGNOSIS — S80.12XA CONTUSION OF LEFT LOWER LEG, INITIAL ENCOUNTER: ICD-10-CM
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
## 2020-10-25 DIAGNOSIS — S02.5XXA FRACTURE OF TOOTH (TRAUMATIC), INITIAL ENCOUNTER FOR CLOSED FRACTURE: Primary | ICD-10-CM

## 2020-10-25 LAB
GLUCOSE BLD-MCNC: 130 MG/DL (ref 70–99)
POCT TEST: ABNORMAL

## 2020-10-25 PROCEDURE — 63700000 HC SELF-ADMINISTRABLE DRUG: Performed by: EMERGENCY MEDICINE

## 2020-10-25 PROCEDURE — 73590 X-RAY EXAM OF LOWER LEG: CPT | Mod: LT

## 2020-10-25 PROCEDURE — 73110 X-RAY EXAM OF WRIST: CPT | Mod: RT

## 2020-10-25 PROCEDURE — 63600000 HC DRUGS/DETAIL CODE: Performed by: EMERGENCY MEDICINE

## 2020-10-25 PROCEDURE — 99283 EMERGENCY DEPT VISIT LOW MDM: CPT | Mod: 25

## 2020-10-25 PROCEDURE — 90715 TDAP VACCINE 7 YRS/> IM: CPT | Performed by: EMERGENCY MEDICINE

## 2020-10-25 PROCEDURE — 90471 IMMUNIZATION ADMIN: CPT | Performed by: EMERGENCY MEDICINE

## 2020-10-25 RX ORDER — ACETAMINOPHEN 325 MG/1
975 TABLET ORAL ONCE
Status: COMPLETED | OUTPATIENT
Start: 2020-10-25 | End: 2020-10-25

## 2020-10-25 RX ORDER — ACETAMINOPHEN 325 MG/1
TABLET ORAL
Status: DISCONTINUED
Start: 2020-10-25 | End: 2020-10-25 | Stop reason: HOSPADM

## 2020-10-25 RX ADMIN — ACETAMINOPHEN 975 MG: 325 TABLET, FILM COATED ORAL at 07:45

## 2020-10-25 RX ADMIN — TETANUS TOXOID, REDUCED DIPHTHERIA TOXOID AND ACELLULAR PERTUSSIS VACCINE, ADSORBED 0.5 ML: 5; 2.5; 8; 8; 2.5 SUSPENSION INTRAMUSCULAR at 07:27

## 2020-10-25 ASSESSMENT — ENCOUNTER SYMPTOMS
ABDOMINAL PAIN: 0
FEVER: 0
FREQUENCY: 0
NAUSEA: 0
HEADACHES: 0
SORE THROAT: 0
WEAKNESS: 0
CHILLS: 0
LIGHT-HEADEDNESS: 0
VOMITING: 0
SHORTNESS OF BREATH: 0
NUMBNESS: 0
DYSURIA: 0
RHINORRHEA: 0

## 2020-10-25 NOTE — ED PROVIDER NOTES
HPI     Chief Complaint   Patient presents with   • Fall       70-year-old female with history of hypertension, diabetes, hyperlipidemia presents for evaluation after a fall.  She states that yesterday at 1300 hrs., she was exiting from a , and missed a step, falling forward onto her face.  She struck her face on the ground, breaking a tooth, she also hurt both of her shins.  She has been able to ambulate with difficulty.  She did not lose consciousness.  She did not want to come to the hospital due to fear of the coronavirus.  Her pain is worse today, so she came here today.  She is right-hand dominant.      History provided by:  Patient  Trauma  Mechanism of injury: fall  Injury location: head/neck, shoulder/arm and leg  Injury location detail: head, R wrist and R lower leg and L lower leg  Incident location: outdoors     Fall:       Fall occurred: down stairs       Impact surface: concrete       Point of impact: face    Current symptoms:       Associated symptoms:             Denies abdominal pain, chest pain, headache, nausea and vomiting.     Relevant PMH:       Tetanus status: unknown       Patient History     Past Medical History:   Diagnosis Date   • Cutaneous abscess of neck 2019   • Hypertension    • Lipid disorder    • Type 2 diabetes mellitus (CMS/[facility])        Past Surgical History:   Procedure Laterality Date   • HYSTERECTOMY         History reviewed. No pertinent family history.    Social History     Tobacco Use   • Smoking status: Never Smoker   • Smokeless tobacco: Never Used   Substance Use Topics   • Alcohol use: Yes     Comment: socially at celebratory events   • Drug use: No       Systems Reviewed from Nursing Triage:          Review of Systems     Review of Systems   Constitutional: Negative for chills and fever.   HENT: Negative for rhinorrhea and sore throat.    Respiratory: Negative for shortness of breath.    Cardiovascular: Negative for chest pain.   Gastrointestinal: Negative for  "abdominal pain, nausea and vomiting.   Genitourinary: Negative for dysuria and frequency.   Neurological: Negative for syncope, weakness, light-headedness, numbness and headaches.   All other systems reviewed and are negative.       Physical Exam     ED Triage Vitals [10/25/20 0632]   Temp Heart Rate Resp BP SpO2   36.3 °C (97.3 °F) 87 18 129/61 97 %      Temp Source Heart Rate Source Patient Position BP Location FiO2 (%) (Set)   Oral Monitor Sitting Left upper arm --       Pulse Ox %: 97 % (10/25/20 0655)  Pulse Ox Interpretation: Normal (10/25/20 0655)  Heart Rate: 87 (10/25/20 0655)  Rhythm Strip Interpretation: Normal Sinus Rhythm (10/25/20 0655)    Patient Vitals for the past 24 hrs:   BP Temp Temp src Pulse Resp SpO2 Height Weight   10/25/20 0830 123/71 -- -- 85 (!) 22 97 % -- --   10/25/20 0801 125/85 -- -- 68 17 98 % -- --   10/25/20 0729 139/72 -- -- 68 18 99 % -- --   10/25/20 0643 -- -- -- -- -- -- 1.575 m (5' 2\") 86.6 kg (191 lb)   10/25/20 0632 129/61 36.3 °C (97.3 °F) Oral 87 18 97 % -- --                                          Physical Exam  Vitals signs and nursing note reviewed.   Constitutional:       Appearance: Normal appearance.   HENT:      Head: Normocephalic.      Mouth/Throat:      Mouth: Mucous membranes are moist.      Pharynx: Oropharynx is clear.        Comments: Contusions and abrasions to mucosal surfaces of anterior upper and lower lips.  Able to palpate through abrasions, and no foreign body or piece of broken tooth palpable.  Eyes:      Extraocular Movements: Extraocular movements intact.      Conjunctiva/sclera: Conjunctivae normal.      Pupils: Pupils are equal, round, and reactive to light.   Neck:      Musculoskeletal: Normal range of motion and neck supple. No muscular tenderness.   Cardiovascular:      Rate and Rhythm: Normal rate and regular rhythm.      Pulses: Normal pulses.      Heart sounds: Normal heart sounds.   Pulmonary:      Effort: Pulmonary effort is normal.      " Breath sounds: Normal breath sounds.   Abdominal:      General: Abdomen is flat. Bowel sounds are normal. There is no distension.      Palpations: Abdomen is soft. There is no mass.      Tenderness: There is no abdominal tenderness.   Musculoskeletal:      Right wrist: She exhibits decreased range of motion and tenderness (Mild diffuse). She exhibits no bony tenderness, no swelling, no effusion, no deformity and no laceration.        Legs:    Skin:     General: Skin is warm and dry.      Capillary Refill: Capillary refill takes less than 2 seconds.   Neurological:      General: No focal deficit present.      Mental Status: She is alert and oriented to person, place, and time.      Cranial Nerves: No cranial nerve deficit.      Sensory: No sensory deficit.      Motor: No weakness.   Psychiatric:         Mood and Affect: Mood normal.              Procedures    Labs Reviewed   POCT GLUCOSE (BEAKER) - Abnormal       Result Value    POCT Bedside Glucose 130 (*)     POC Test POC         X-RAY WRIST RIGHT 3+ VIEWS   ED Interpretation   NAD      Final Result   IMPRESSION:      No acute osseous abnormality on plain films. Please see above.   Degenerative changes.         X-RAY TIBIA FIBULA LEFT 2 VIEWS   ED Interpretation   No fracture, calcification in Achilles?      Final Result   IMPRESSION:      No acute osseous abnormality on plain films. Please see above. Advanced   degenerative changes.                        ED Course & MDM     MDM         ED Course as of Oct 25 1607   Sun Oct 25, 2020   0709 70-year-old female presents status post a fall yesterday.  She has a dental fracture, as well as injuries to her extremities.  No indication for head imaging.  Will require dentist for care of dental fracture.  Will obtain imaging of left leg and right wrist, then reassess.    [ALEX]   6530 X-rays reviewed.  There is no evidence of fracture.  Stable for discharge with a walker and outpatient follow-up with primary care physician.   She will also need to follow-up with her dentist.    [ALEX]      ED Course User Index  [ALEX] Simone Jolly MD         Clinical Impressions as of Oct 25 1607   Fracture of tooth (traumatic), initial encounter for closed fracture   Fall, initial encounter   Contusion of left lower leg, initial encounter   Sprain of right wrist, initial encounter        Simone Jolly MD  10/25/20 1607

## 2020-10-25 NOTE — DISCHARGE INSTRUCTIONS
Use ice on anything that hurts you.  Use Tylenol for the pain.  Follow-up with a dentist, and with your primary care physician.  Return here sooner if you have other concerns.

## 2020-10-28 RX ORDER — OMEPRAZOLE 40 MG/1
CAPSULE, DELAYED RELEASE ORAL
Qty: 90 CAPSULE | Refills: 1 | Status: SHIPPED | OUTPATIENT
Start: 2020-10-28 | End: 2021-05-27 | Stop reason: SDUPTHER

## 2020-10-29 NOTE — TELEPHONE ENCOUNTER
Case shows Pending/Cancelled through [employer] Portal. Called and received pending notification through [employer] answering service.     After speaking with Representative Amrita she informed me that this case was denied due to duplicate information that was submitted from previous case #[id number].     Amrita informed me the denial fax was sent on 10/9/2020. Requested denial letter to be refaxed. Could take up to 4 hours for denial fax to be received or will be submitted by tomorrow morning in their overnight batch of faxes.    One of the phone #'s listed on the appeals letter is [phone]. In case the fax is not received this phone # was provided to appeal decision.

## 2020-10-30 ENCOUNTER — TELEPHONE (OUTPATIENT)
Dept: FAMILY MEDICINE | Facility: CLINIC | Age: 70
End: 2020-10-30

## 2020-11-02 RX ORDER — ROSUVASTATIN CALCIUM 20 MG/1
20 TABLET, COATED ORAL
Qty: 90 TABLET | Refills: 1 | Status: SHIPPED | OUTPATIENT
Start: 2020-11-02 | End: 2021-03-04 | Stop reason: SDUPTHER

## 2020-11-03 ENCOUNTER — OFFICE VISIT (OUTPATIENT)
Dept: FAMILY MEDICINE | Facility: CLINIC | Age: 70
End: 2020-11-03
Payer: COMMERCIAL

## 2020-11-03 VITALS
HEIGHT: 62 IN | WEIGHT: 196 LBS | BODY MASS INDEX: 36.07 KG/M2 | OXYGEN SATURATION: 97 % | HEART RATE: 99 BPM | TEMPERATURE: 97.5 F

## 2020-11-03 DIAGNOSIS — S80.11XA TRAUMATIC ECCHYMOSIS OF MULTIPLE SITES OF RIGHT LOWER EXTREMITY, INITIAL ENCOUNTER: ICD-10-CM

## 2020-11-03 DIAGNOSIS — S80.12XA TRAUMATIC ECCHYMOSIS OF LEFT LOWER LEG, INITIAL ENCOUNTER: Primary | ICD-10-CM

## 2020-11-03 DIAGNOSIS — S02.5XXA CLOSED FRACTURE OF TOOTH, INITIAL ENCOUNTER: ICD-10-CM

## 2020-11-03 PROCEDURE — 99214 OFFICE O/P EST MOD 30 MIN: CPT | Performed by: FAMILY MEDICINE

## 2020-11-03 NOTE — PROGRESS NOTES
Subjective      Patient ID: Fanny Reddy is a 70 y.o. female.    HPI     ER follow up  10/25/2020 [facility] for fall (mechanical)  Leaving a , missed a step and fell forward  Fractured/chipped tooth - has seen dentist - will get cap  Bruised b/l shins and right wrist sprain    Right wrist feels better  Bruising improving on legs - was given walker in ER but no longer needs  Still with big lumps but slowly improving    Never hit head, no LOC    ER notes reviewed along with imaging (no fractures/dislocations)    The following have been reviewed and updated as appropriate in this visit:       Review of Systems   Constitutional: Negative for activity change, appetite change, fatigue and fever.   Respiratory: Negative for cough, shortness of breath and wheezing.    Cardiovascular: Negative for chest pain.   Gastrointestinal: Negative for abdominal pain, constipation, diarrhea, nausea and vomiting.   Genitourinary: Negative for frequency and hematuria.   Musculoskeletal: Positive for arthralgias and gait problem. Negative for back pain.   Skin: Negative for rash.   Neurological: Negative for dizziness, weakness and numbness.   Psychiatric/Behavioral: The patient is not nervous/anxious.        Social History     Socioeconomic History   • Marital status: Single     Spouse name: Not on file   • Number of children: Not on file   • Years of education: Not on file   • Highest education level: Not on file   Occupational History   • Not on file   Social Needs   • Financial resource strain: Not on file   • Food insecurity     Worry: Not on file     Inability: Not on file   • Transportation needs     Medical: Not on file     Non-medical: Not on file   Tobacco Use   • Smoking status: Never Smoker   • Smokeless tobacco: Never Used   Substance and Sexual Activity   • Alcohol use: Yes     Comment: socially at celebratory events   • Drug use: No   • Sexual activity: Never   Lifestyle   • Physical activity     Days per week: Not  "on file     Minutes per session: Not on file   • Stress: Not on file   Relationships   • Social connections     Talks on phone: Not on file     Gets together: Not on file     Attends [employer] service: Not on file     Active member of club or organization: Not on file     Attends meetings of clubs or organizations: Not on file     Relationship status: Not on file   • Intimate partner violence     Fear of current or ex partner: Not on file     Emotionally abused: Not on file     Physically abused: Not on file     Forced sexual activity: Not on file   Other Topics Concern   • Not on file   Social History Narrative   • Not on file       Objective     Vitals:    11/03/20 1223   Pulse: 99   Temp: 36.4 °C (97.5 °F)   SpO2: 97%   Weight: 88.9 kg (196 lb)   Height: 1.575 m (5' 2\")     Body mass index is 35.85 kg/m².    Physical Exam  Constitutional:       Appearance: Normal appearance. She is well-developed.   HENT:      Head: Normocephalic and atraumatic.   Neck:      Thyroid: No thyromegaly.   Cardiovascular:      Rate and Rhythm: Normal rate and regular rhythm.      Heart sounds: Normal heart sounds. No murmur. No gallop.    Pulmonary:      Effort: Pulmonary effort is normal. No respiratory distress.      Breath sounds: Normal breath sounds. No wheezing.   Musculoskeletal: Normal range of motion.         General: Swelling, tenderness and signs of injury present.      Comments: B/l ecchymosis (different healing stages) and lump palpable on b/l shins, mild tender to palpation  No skin breakdown or signs of infection   Skin:     General: Skin is warm and dry.      Findings: Bruising present. No erythema or rash.   Neurological:      General: No focal deficit present.      Mental Status: She is alert and oriented to person, place, and time. Mental status is at baseline.   Psychiatric:         Mood and Affect: Mood normal.         Behavior: Behavior normal.         Thought Content: Thought content normal.         Judgment: " Judgment normal.         Assessment/Plan   Problem List Items Addressed This Visit     None      Visit Diagnoses     Traumatic ecchymosis of left lower leg, initial encounter    -  Primary    s/p fall, healing, cont ice/heat, nsaids prn    Traumatic ecchymosis of multiple sites of right lower extremity, initial encounter        s/p fall, improving, RICE, heat, nsaids    Closed fracture of tooth, initial encounter        has appt with dentist to get cap made and placed

## 2020-11-05 ASSESSMENT — ENCOUNTER SYMPTOMS
DIARRHEA: 0
SHORTNESS OF BREATH: 0
BACK PAIN: 0
ABDOMINAL PAIN: 0
WHEEZING: 0
CONSTIPATION: 0
DIZZINESS: 0
FREQUENCY: 0
WEAKNESS: 0
ARTHRALGIAS: 1
APPETITE CHANGE: 0
HEMATURIA: 0
NUMBNESS: 0
NAUSEA: 0
COUGH: 0
FATIGUE: 0
FEVER: 0
VOMITING: 0
ACTIVITY CHANGE: 0
NERVOUS/ANXIOUS: 0

## 2020-11-09 RX ORDER — NAPROXEN 500 MG/1
500 TABLET ORAL 2 TIMES DAILY WITH MEALS
Qty: 60 TABLET | Refills: 1 | Status: SHIPPED | OUTPATIENT
Start: 2020-11-09 | End: 2021-01-04

## 2020-11-12 NOTE — TELEPHONE ENCOUNTER
Spoke to [employer] they will resend denial fax to [phone]. Beryl informed me to seek Peer to Peer to contact patient Insurance plan [facility] at [phone].

## 2020-11-13 NOTE — TELEPHONE ENCOUNTER
Okay, can we move forward with scheduling the peer to peer then? I let patient know that MRI was denied and we will try to move forward with peer to peer. Thank you.

## 2020-11-16 ENCOUNTER — TRANSCRIBE ORDERS (OUTPATIENT)
Dept: RADIOLOGY | Facility: HOSPITAL | Age: 70
End: 2020-11-16

## 2020-11-16 ENCOUNTER — HOSPITAL ENCOUNTER (OUTPATIENT)
Dept: RADIOLOGY | Facility: HOSPITAL | Age: 70
Discharge: HOME | End: 2020-11-16
Attending: PHYSICIAN ASSISTANT
Payer: COMMERCIAL

## 2020-11-16 DIAGNOSIS — Z12.31 ENCOUNTER FOR SCREENING MAMMOGRAM FOR MALIGNANT NEOPLASM OF BREAST: Primary | ICD-10-CM

## 2020-11-16 DIAGNOSIS — Z12.31 ENCOUNTER FOR SCREENING MAMMOGRAM FOR MALIGNANT NEOPLASM OF BREAST: ICD-10-CM

## 2020-11-16 PROCEDURE — 77063 BREAST TOMOSYNTHESIS BI: CPT

## 2020-11-16 NOTE — TELEPHONE ENCOUNTER
Spoke with Erick with [employer]. Peer to Peer has been initiated for case # [id number]. Reference # for call is [id number]. Informed Erick to have Peer to Peer reviewer to call during 11/18/2020 between 1pm-5pm or 11/19/2020 from 8am-4:30pm during Dr. Carr office hours.

## 2020-11-17 ENCOUNTER — TELEPHONE (OUTPATIENT)
Dept: FAMILY MEDICINE | Facility: CLINIC | Age: 70
End: 2020-11-17

## 2020-11-17 NOTE — TELEPHONE ENCOUNTER
Dr Odalis Remy from [facility] called wanting to speak with Dr Carr or the PCP of the Pt. The call is regarding an expedited appeal the Pt is requesting a peer to peer. Dr Remy radha be available today after 1:30pm she says it is urgent and is due today @ 5pm

## 2020-11-17 NOTE — TELEPHONE ENCOUNTER
MRI has been approved for [facility].  Appeals # [id number]  Auth #[id number]  Valid from 10/01/2020-03/30/2021.    LVM to notify patient of above information and will document information on patient's [employer] Portal.     Notified patient if she is not aware if this is a covered benefit to call her insurance.

## 2020-11-25 LAB
ALBUMIN SERPL-MCNC: 4.5 G/DL (ref 3.8–4.8)
ALBUMIN/GLOB SERPL: 2.4 {RATIO} (ref 1.2–2.2)
ALP SERPL-CCNC: 72 IU/L (ref 39–117)
ALT SERPL-CCNC: 14 IU/L (ref 0–32)
AST SERPL-CCNC: 17 IU/L (ref 0–40)
BILIRUB SERPL-MCNC: 0.4 MG/DL (ref 0–1.2)
BUN SERPL-MCNC: 15 MG/DL (ref 8–27)
BUN/CREAT SERPL: 16 (ref 12–28)
CALCIUM SERPL-MCNC: 9.7 MG/DL (ref 8.7–10.3)
CHLORIDE SERPL-SCNC: 101 MMOL/L (ref 96–106)
CO2 SERPL-SCNC: 23 MMOL/L (ref 20–29)
CREAT SERPL-MCNC: 0.91 MG/DL (ref 0.57–1)
GLOBULIN SER CALC-MCNC: 1.9 G/DL (ref 1.5–4.5)
GLUCOSE SERPL-MCNC: 95 MG/DL (ref 65–99)
HBA1C MFR BLD: 6.6 % (ref 4.8–5.6)
LAB CORP EGFR IF AFRICN AM: 74 ML/MIN/1.73
LAB CORP EGFR IF NONAFRICN AM: 65 ML/MIN/1.73
POTASSIUM SERPL-SCNC: 4.4 MMOL/L (ref 3.5–5.2)
PROT SERPL-MCNC: 6.4 G/DL (ref 6–8.5)
SODIUM SERPL-SCNC: 138 MMOL/L (ref 134–144)

## 2020-12-07 ENCOUNTER — HOSPITAL ENCOUNTER (OUTPATIENT)
Dept: RADIOLOGY | Facility: HOSPITAL | Age: 70
Discharge: HOME | End: 2020-12-07
Attending: STUDENT IN AN ORGANIZED HEALTH CARE EDUCATION/TRAINING PROGRAM
Payer: COMMERCIAL

## 2020-12-07 DIAGNOSIS — R22.1 LOCALIZED SWELLING, MASS OR LUMP OF NECK: ICD-10-CM

## 2020-12-07 PROCEDURE — 70543 MRI ORBT/FAC/NCK W/O &W/DYE: CPT | Mod: ME

## 2020-12-07 PROCEDURE — A9585 GADOBUTROL INJECTION: HCPCS | Performed by: STUDENT IN AN ORGANIZED HEALTH CARE EDUCATION/TRAINING PROGRAM

## 2020-12-07 RX ORDER — GADOBUTROL 604.72 MG/ML
0.1 INJECTION INTRAVENOUS
Status: COMPLETED | OUTPATIENT
Start: 2020-12-07 | End: 2020-12-07

## 2020-12-07 RX ADMIN — GADOBUTROL 8.7 MMOL: 604.72 INJECTION INTRAVENOUS at 15:04

## 2020-12-08 RX ORDER — METFORMIN HYDROCHLORIDE 1000 MG/1
1000 TABLET ORAL 2 TIMES DAILY WITH MEALS
Qty: 180 TABLET | Refills: 1 | Status: SHIPPED | OUTPATIENT
Start: 2020-12-08 | End: 2021-03-04 | Stop reason: SDUPTHER

## 2020-12-14 ENCOUNTER — OFFICE VISIT (OUTPATIENT)
Dept: FAMILY MEDICINE | Facility: CLINIC | Age: 70
End: 2020-12-14
Payer: COMMERCIAL

## 2020-12-14 VITALS
HEIGHT: 64 IN | DIASTOLIC BLOOD PRESSURE: 78 MMHG | BODY MASS INDEX: 33.29 KG/M2 | OXYGEN SATURATION: 98 % | WEIGHT: 195 LBS | SYSTOLIC BLOOD PRESSURE: 132 MMHG | TEMPERATURE: 96.3 F | HEART RATE: 82 BPM

## 2020-12-14 DIAGNOSIS — E11.65 TYPE 2 DIABETES MELLITUS WITH HYPERGLYCEMIA, WITHOUT LONG-TERM CURRENT USE OF INSULIN (CMS/HCC): ICD-10-CM

## 2020-12-14 DIAGNOSIS — I10 BENIGN ESSENTIAL HYPERTENSION: Primary | ICD-10-CM

## 2020-12-14 DIAGNOSIS — E66.09 CLASS 1 OBESITY DUE TO EXCESS CALORIES WITH SERIOUS COMORBIDITY AND BODY MASS INDEX (BMI) OF 33.0 TO 33.9 IN ADULT: ICD-10-CM

## 2020-12-14 DIAGNOSIS — E78.2 MULTIPLE-TYPE HYPERLIPIDEMIA: ICD-10-CM

## 2020-12-14 DIAGNOSIS — E66.811 CLASS 1 OBESITY DUE TO EXCESS CALORIES WITH SERIOUS COMORBIDITY AND BODY MASS INDEX (BMI) OF 33.0 TO 33.9 IN ADULT: ICD-10-CM

## 2020-12-14 PROCEDURE — 99214 OFFICE O/P EST MOD 30 MIN: CPT | Performed by: FAMILY MEDICINE

## 2020-12-14 ASSESSMENT — ENCOUNTER SYMPTOMS
FREQUENCY: 0
FEVER: 0
HEMATURIA: 0
CONSTIPATION: 0
DIARRHEA: 0
NUMBNESS: 0
BACK PAIN: 0
APPETITE CHANGE: 0
SHORTNESS OF BREATH: 0
COUGH: 0
TROUBLE SWALLOWING: 0
EYE PAIN: 0
ACTIVITY CHANGE: 0
VOMITING: 0
DIZZINESS: 0
ARTHRALGIAS: 0
WHEEZING: 0
NAUSEA: 0
ABDOMINAL PAIN: 0
FATIGUE: 0
NERVOUS/ANXIOUS: 0
SORE THROAT: 0

## 2020-12-14 NOTE — PROGRESS NOTES
Subjective      Patient ID: Fanny Reddy is a 70 y.o. female.    HPI     dm2 - a1c improved to 6.6%, saw dr. dupont last week - next visit 6 months  No issues with low blood sugars  Am bs 103  htn - stable  hld - stable    The following have been reviewed and updated as appropriate in this visit:  Allergies  Meds  Problems       Review of Systems   Constitutional: Negative for activity change, appetite change, fatigue and fever.   HENT: Negative for congestion, ear pain, postnasal drip, sore throat and trouble swallowing.    Eyes: Negative for pain.   Respiratory: Negative for cough, shortness of breath and wheezing.    Cardiovascular: Negative for chest pain.   Gastrointestinal: Negative for abdominal pain, constipation, diarrhea, nausea and vomiting.   Genitourinary: Negative for frequency and hematuria.   Musculoskeletal: Negative for arthralgias and back pain.   Skin: Negative for rash.   Neurological: Negative for dizziness and numbness.   Psychiatric/Behavioral: The patient is not nervous/anxious.        Social History     Socioeconomic History   • Marital status: Single     Spouse name: Not on file   • Number of children: Not on file   • Years of education: Not on file   • Highest education level: Not on file   Occupational History   • Not on file   Social Needs   • Financial resource strain: Not on file   • Food insecurity     Worry: Not on file     Inability: Not on file   • Transportation needs     Medical: Not on file     Non-medical: Not on file   Tobacco Use   • Smoking status: Never Smoker   • Smokeless tobacco: Never Used   Substance and Sexual Activity   • Alcohol use: Yes     Comment: socially at celebratory events   • Drug use: No   • Sexual activity: Never   Lifestyle   • Physical activity     Days per week: Not on file     Minutes per session: Not on file   • Stress: Not on file   Relationships   • Social connections     Talks on phone: Not on file     Gets together: Not on file      "Attends [employer] service: Not on file     Active member of club or organization: Not on file     Attends meetings of clubs or organizations: Not on file     Relationship status: Not on file   • Intimate partner violence     Fear of current or ex partner: Not on file     Emotionally abused: Not on file     Physically abused: Not on file     Forced sexual activity: Not on file   Other Topics Concern   • Not on file   Social History Narrative   • Not on file       Objective     Vitals:    12/14/20 0901   BP: 132/78   Pulse: 82   Temp: (!) 35.7 °C (96.3 °F)   SpO2: 98%   Weight: 88.5 kg (195 lb)   Height: 1.626 m (5' 4\")     Body mass index is 33.47 kg/m².    Physical Exam  Constitutional:       Appearance: She is well-developed.   HENT:      Head: Normocephalic and atraumatic.   Neck:      Thyroid: No thyromegaly.   Cardiovascular:      Rate and Rhythm: Normal rate and regular rhythm.      Heart sounds: Normal heart sounds.   Pulmonary:      Effort: Pulmonary effort is normal.      Breath sounds: Normal breath sounds.   Skin:     General: Skin is warm and dry.   Neurological:      Mental Status: She is alert and oriented to person, place, and time.         Assessment/Plan   Problem List Items Addressed This Visit        Circulatory    Benign essential hypertension - Primary     Stable, cont current regimen  Counseled low salt/sodium diet and increased cardiovascular exercise              Endocrine/Metabolic    Type 2 diabetes mellitus with hyperglycemia (CMS/[facility])     Controlled  Lab Results   Component Value Date    HGBA1C 6.6 (H) 11/24/2020     Cont statin and acei  Pt saw dr. dupont for ophtho exam - will obtain report  Cont current regimen             Multiple-type hyperlipidemia     Cont statin    The 10-year ASCVD risk score (Broomalltimothy ROSS Jr., et al., 2013) is: 20.2%    Values used to calculate the score:      Age: 70 years      Sex: Female      Is Non- : Yes      Diabetic: Yes      Tobacco " smoker: No      Systolic Blood Pressure: 132 mmHg      Is BP treated: Yes      HDL Cholesterol: 41 mg/dL      Total Cholesterol: 145 mg/dL           Class 1 obesity due to excess calories with serious comorbidity and body mass index (BMI) of 33.0 to 33.9 in adult     Encouraged DASH/ADA diet with caloric appropriate portioning and limiting salt intake to < 2000mg/day. Encouraged regular aerobic exercise for at least 120-150 mins/week for cardioprotective benefits. Discussed goal BMI < 30 for morbidity/mortality benefits.                Other    BMI 33.0-33.9,adult     Encouraged DASH/ADA diet with caloric appropriate portioning and limiting salt intake to < 2000mg/day. Encouraged regular aerobic exercise for at least 120-150 mins/week for cardioprotective benefits. Discussed goal BMI < 30 for morbidity/mortality benefits.                 utd preventative measures

## 2020-12-14 NOTE — ASSESSMENT & PLAN NOTE
Encouraged DASH/ADA diet with caloric appropriate portioning and limiting salt intake to < 2000mg/day. Encouraged regular aerobic exercise for at least 120-150 mins/week for cardioprotective benefits. Discussed goal BMI < 30 for morbidity/mortality benefits.

## 2020-12-14 NOTE — ASSESSMENT & PLAN NOTE
Cont statin    The 10-year ASCVD risk score (Adán ROSS Jr., et al., 2013) is: 20.2%    Values used to calculate the score:      Age: 70 years      Sex: Female      Is Non- : Yes      Diabetic: Yes      Tobacco smoker: No      Systolic Blood Pressure: 132 mmHg      Is BP treated: Yes      HDL Cholesterol: 41 mg/dL      Total Cholesterol: 145 mg/dL

## 2020-12-14 NOTE — ASSESSMENT & PLAN NOTE
Controlled  Lab Results   Component Value Date    HGBA1C 6.6 (H) 11/24/2020     Cont statin and acei  Pt saw dr. dupont for ophtho exam - will obtain report  Cont current regimen

## 2020-12-21 RX ORDER — BETAMETHASONE DIPROPIONATE 0.5 MG/G
OINTMENT TOPICAL 2 TIMES DAILY
Qty: 30 G | Refills: 1 | Status: SHIPPED | OUTPATIENT
Start: 2020-12-21 | End: 2021-05-14

## 2020-12-24 RX ORDER — DAPAGLIFLOZIN 10 MG/1
10 TABLET, FILM COATED ORAL DAILY
Qty: 90 TABLET | Refills: 1 | Status: SHIPPED | OUTPATIENT
Start: 2020-12-24 | End: 2021-01-04

## 2021-01-04 RX ORDER — DAPAGLIFLOZIN 10 MG/1
TABLET, FILM COATED ORAL
Qty: 90 TABLET | Refills: 1 | Status: SHIPPED | OUTPATIENT
Start: 2021-01-04 | End: 2021-06-30

## 2021-01-04 RX ORDER — NAPROXEN 500 MG/1
TABLET ORAL
Qty: 60 TABLET | Refills: 1 | Status: SHIPPED | OUTPATIENT
Start: 2021-01-04 | End: 2021-04-26

## 2021-02-02 ENCOUNTER — APPOINTMENT (RX ONLY)
Dept: URBAN - METROPOLITAN AREA CLINIC 28 | Facility: CLINIC | Age: 71
Setting detail: DERMATOLOGY
End: 2021-02-02

## 2021-02-02 DIAGNOSIS — L30.1 DYSHIDROSIS [POMPHOLYX]: ICD-10-CM | Status: WORSENING

## 2021-02-02 DIAGNOSIS — Z02.9 ENCOUNTER FOR ADMINISTRATIVE EXAMINATIONS, UNSPECIFIED: ICD-10-CM

## 2021-02-02 PROCEDURE — 99214 OFFICE O/P EST MOD 30 MIN: CPT | Mod: 95

## 2021-02-02 PROCEDURE — ? REASON FOR TELEMEDICINE VISIT

## 2021-02-02 PROCEDURE — ? COUNSELING

## 2021-02-02 PROCEDURE — ? PRESCRIPTION MEDICATION MANAGEMENT

## 2021-02-02 PROCEDURE — ? PRESCRIPTION

## 2021-02-02 RX ORDER — HYDROXYZINE HYDROCHLORIDE 10 MG/1
TABLET, FILM COATED ORAL QHS
Qty: 30 | Refills: 3 | Status: ERX | COMMUNITY
Start: 2021-02-02

## 2021-02-02 RX ORDER — TACROLIMUS 1 MG/G
OINTMENT TOPICAL QHS
Qty: 1 | Refills: 2 | Status: CANCELLED | COMMUNITY
Start: 2021-02-02

## 2021-02-02 RX ORDER — CLOBETASOL PROPIONATE 0.5 MG/G
OINTMENT TOPICAL BID
Qty: 1 | Refills: 2 | Status: ERX | COMMUNITY
Start: 2021-02-02

## 2021-02-02 RX ADMIN — TACROLIMUS: 1 OINTMENT TOPICAL at 00:00

## 2021-02-02 RX ADMIN — CLOBETASOL PROPIONATE: 0.5 OINTMENT TOPICAL at 00:00

## 2021-02-02 RX ADMIN — HYDROXYZINE HYDROCHLORIDE: 10 TABLET, FILM COATED ORAL at 00:00

## 2021-02-02 ASSESSMENT — LOCATION DETAILED DESCRIPTION DERM
LOCATION DETAILED: LEFT RADIAL PALM
LOCATION DETAILED: RIGHT ULNAR PALM

## 2021-02-02 ASSESSMENT — LOCATION SIMPLE DESCRIPTION DERM
LOCATION SIMPLE: LEFT HAND
LOCATION SIMPLE: RIGHT HAND

## 2021-02-02 ASSESSMENT — LOCATION ZONE DERM: LOCATION ZONE: HAND

## 2021-02-02 NOTE — HPI: RASH (ECZEMA)
How Severe Is Your Eczema?: moderate
Is This A New Presentation, Or A Follow-Up?: Follow Up Eczema
Additional History: Uses cerave ointment daily

## 2021-02-02 NOTE — PROCEDURE: PRESCRIPTION MEDICATION MANAGEMENT
Initiate Treatment: clobetasol 0.05% topical ointment: Apply a thin layer to aa of hands BID prn flare\\nProtopic 0.1% topical ointment: Apply thin layer to hands qhs\\nhydroxyzine HCl 10mg tablet: take 1 pill po qhs
Detail Level: Zone
Plan: pt also has betamethasone ointment, she can decide which steroid she likes better
Render In Strict Bullet Format?: No

## 2021-03-04 RX ORDER — LISINOPRIL AND HYDROCHLOROTHIAZIDE 10; 12.5 MG/1; MG/1
1 TABLET ORAL
Qty: 90 TABLET | Refills: 1 | Status: SHIPPED | OUTPATIENT
Start: 2021-03-04 | End: 2021-04-08

## 2021-03-04 RX ORDER — METFORMIN HYDROCHLORIDE 1000 MG/1
1000 TABLET ORAL 2 TIMES DAILY WITH MEALS
Qty: 180 TABLET | Refills: 1 | Status: SHIPPED | OUTPATIENT
Start: 2021-03-04 | End: 2021-05-28 | Stop reason: SDUPTHER

## 2021-03-04 RX ORDER — ROSUVASTATIN CALCIUM 20 MG/1
20 TABLET, COATED ORAL
Qty: 90 TABLET | Refills: 1 | Status: SHIPPED | OUTPATIENT
Start: 2021-03-04 | End: 2021-06-30

## 2021-03-05 ENCOUNTER — APPOINTMENT (RX ONLY)
Dept: URBAN - METROPOLITAN AREA CLINIC 28 | Facility: CLINIC | Age: 71
Setting detail: DERMATOLOGY
End: 2021-03-05

## 2021-03-05 DIAGNOSIS — B35.1 TINEA UNGUIUM: ICD-10-CM

## 2021-03-05 DIAGNOSIS — L30.1 DYSHIDROSIS [POMPHOLYX]: ICD-10-CM | Status: IMPROVED

## 2021-03-05 PROCEDURE — ? COUNSELING

## 2021-03-05 PROCEDURE — 99213 OFFICE O/P EST LOW 20 MIN: CPT

## 2021-03-05 PROCEDURE — ? PRESCRIPTION MEDICATION MANAGEMENT

## 2021-03-05 PROCEDURE — ? PHOTO-DOCUMENTATION

## 2021-03-05 PROCEDURE — ? PRESCRIPTION

## 2021-03-05 RX ORDER — EFINACONAZOLE 100 MG/ML
SOLUTION TOPICAL QHS
Qty: 1 | Refills: 3 | Status: ERX | COMMUNITY
Start: 2021-03-05

## 2021-03-05 RX ADMIN — EFINACONAZOLE: 100 SOLUTION TOPICAL at 00:00

## 2021-03-05 ASSESSMENT — LOCATION SIMPLE DESCRIPTION DERM
LOCATION SIMPLE: RIGHT HAND
LOCATION SIMPLE: LEFT INDEX FINGERNAIL
LOCATION SIMPLE: RIGHT INDEX FINGERNAIL
LOCATION SIMPLE: LEFT HAND

## 2021-03-05 ASSESSMENT — LOCATION DETAILED DESCRIPTION DERM
LOCATION DETAILED: RIGHT INDEX FINGERNAIL
LOCATION DETAILED: LEFT RADIAL PALM
LOCATION DETAILED: LEFT INDEX FINGERNAIL
LOCATION DETAILED: RIGHT ULNAR PALM

## 2021-03-05 ASSESSMENT — LOCATION ZONE DERM
LOCATION ZONE: FINGERNAIL
LOCATION ZONE: HAND

## 2021-03-05 NOTE — PROCEDURE: PRESCRIPTION MEDICATION MANAGEMENT
Continue Regimen: clobetasol 0.05% topical ointment: Apply a thin layer to aa of hands BID prn flare\\nProtopic 0.1% topical ointment: Apply thin layer to hands qhs
Detail Level: Zone
Discontinue Regimen: betamethasone ointment\\nhydroxyzine HCl 10mg tablet: take 1 pill po qhs (did not get)
Render In Strict Bullet Format?: No
Initiate Treatment: Jublia 10% topical solution with applicator: Apply a thin layer to affected nails QHS, take off once a week with a nail file

## 2021-03-16 ENCOUNTER — IMMUNIZATION (OUTPATIENT)
Dept: IMMUNIZATION | Facility: CLINIC | Age: 71
End: 2021-03-16

## 2021-04-08 RX ORDER — LISINOPRIL AND HYDROCHLOROTHIAZIDE 10; 12.5 MG/1; MG/1
TABLET ORAL
Qty: 90 TABLET | Refills: 1 | Status: SHIPPED | OUTPATIENT
Start: 2021-04-08 | End: 2021-10-19

## 2021-04-10 ENCOUNTER — IMMUNIZATION (OUTPATIENT)
Dept: IMMUNIZATION | Facility: CLINIC | Age: 71
End: 2021-04-10

## 2021-04-26 ENCOUNTER — TELEPHONE (OUTPATIENT)
Dept: FAMILY MEDICINE | Facility: CLINIC | Age: 71
End: 2021-04-26

## 2021-04-26 DIAGNOSIS — E11.65 TYPE 2 DIABETES MELLITUS WITH HYPERGLYCEMIA, WITHOUT LONG-TERM CURRENT USE OF INSULIN (CMS/HCC): Primary | ICD-10-CM

## 2021-04-26 NOTE — TELEPHONE ENCOUNTER
Patient needs a script for bloodwork .  There is an order in system but it is for [facility].  Patient would need to go to [facility].

## 2021-04-29 RX ORDER — VALACYCLOVIR HYDROCHLORIDE 500 MG/1
500 TABLET, FILM COATED ORAL DAILY
Qty: 90 TABLET | Refills: 1 | Status: SHIPPED | OUTPATIENT
Start: 2021-04-29 | End: 2021-08-23

## 2021-05-07 LAB
ALBUMIN SERPL-MCNC: 4.3 G/DL (ref 3.8–4.8)
ALBUMIN/GLOB SERPL: 2.3 {RATIO} (ref 1.2–2.2)
ALP SERPL-CCNC: 81 IU/L (ref 39–117)
ALT SERPL-CCNC: 19 IU/L (ref 0–32)
AST SERPL-CCNC: 17 IU/L (ref 0–40)
BASOPHILS # BLD AUTO: 0.1 X10E3/UL (ref 0–0.2)
BASOPHILS NFR BLD AUTO: 1 %
BILIRUB SERPL-MCNC: 0.3 MG/DL (ref 0–1.2)
BUN SERPL-MCNC: 15 MG/DL (ref 8–27)
BUN/CREAT SERPL: 17 (ref 12–28)
CALCIUM SERPL-MCNC: 9.3 MG/DL (ref 8.7–10.3)
CHLORIDE SERPL-SCNC: 99 MMOL/L (ref 96–106)
CHOLEST SERPL-MCNC: 159 MG/DL (ref 100–199)
CO2 SERPL-SCNC: 26 MMOL/L (ref 20–29)
CREAT SERPL-MCNC: 0.89 MG/DL (ref 0.57–1)
EOSINOPHIL # BLD AUTO: 0.1 X10E3/UL (ref 0–0.4)
EOSINOPHIL NFR BLD AUTO: 3 %
ERYTHROCYTE [DISTWIDTH] IN BLOOD BY AUTOMATED COUNT: 13.7 % (ref 11.7–15.4)
GLOBULIN SER CALC-MCNC: 1.9 G/DL (ref 1.5–4.5)
GLUCOSE SERPL-MCNC: 120 MG/DL (ref 65–99)
HBA1C MFR BLD: 6.7 % (ref 4.8–5.6)
HCT VFR BLD AUTO: 43.1 % (ref 34–46.6)
HDLC SERPL-MCNC: 45 MG/DL
HGB BLD-MCNC: 13.8 G/DL (ref 11.1–15.9)
IMM GRANULOCYTES # BLD AUTO: 0 X10E3/UL (ref 0–0.1)
IMM GRANULOCYTES NFR BLD AUTO: 0 %
LAB CORP EGFR IF AFRICN AM: 76 ML/MIN/1.73
LAB CORP EGFR IF NONAFRICN AM: 66 ML/MIN/1.73
LDLC SERPL CALC-MCNC: 97 MG/DL (ref 0–99)
LYMPHOCYTES # BLD AUTO: 1.6 X10E3/UL (ref 0.7–3.1)
LYMPHOCYTES NFR BLD AUTO: 35 %
MCH RBC QN AUTO: 24.8 PG (ref 26.6–33)
MCHC RBC AUTO-ENTMCNC: 32 G/DL (ref 31.5–35.7)
MCV RBC AUTO: 78 FL (ref 79–97)
MICROALBUMIN UR-MCNC: <3 UG/ML
MONOCYTES # BLD AUTO: 0.4 X10E3/UL (ref 0.1–0.9)
MONOCYTES NFR BLD AUTO: 9 %
NEUTROPHILS # BLD AUTO: 2.3 X10E3/UL (ref 1.4–7)
NEUTROPHILS NFR BLD AUTO: 52 %
PLATELET # BLD AUTO: 347 X10E3/UL (ref 150–450)
POTASSIUM SERPL-SCNC: 4.3 MMOL/L (ref 3.5–5.2)
PROT SERPL-MCNC: 6.2 G/DL (ref 6–8.5)
RBC # BLD AUTO: 5.56 X10E6/UL (ref 3.77–5.28)
SODIUM SERPL-SCNC: 139 MMOL/L (ref 134–144)
TRIGL SERPL-MCNC: 92 MG/DL (ref 0–149)
VLDLC SERPL CALC-MCNC: 17 MG/DL (ref 5–40)
WBC # BLD AUTO: 4.5 X10E3/UL (ref 3.4–10.8)

## 2021-05-14 ENCOUNTER — TELEPHONE (OUTPATIENT)
Dept: FAMILY MEDICINE | Facility: CLINIC | Age: 71
End: 2021-05-14

## 2021-05-14 ENCOUNTER — OFFICE VISIT (OUTPATIENT)
Dept: FAMILY MEDICINE | Facility: CLINIC | Age: 71
End: 2021-05-14
Payer: COMMERCIAL

## 2021-05-14 VITALS
OXYGEN SATURATION: 98 % | HEIGHT: 64 IN | WEIGHT: 201 LBS | HEART RATE: 100 BPM | SYSTOLIC BLOOD PRESSURE: 124 MMHG | TEMPERATURE: 96.5 F | BODY MASS INDEX: 34.31 KG/M2 | DIASTOLIC BLOOD PRESSURE: 74 MMHG

## 2021-05-14 DIAGNOSIS — E66.811 CLASS 1 OBESITY DUE TO EXCESS CALORIES WITH SERIOUS COMORBIDITY AND BODY MASS INDEX (BMI) OF 33.0 TO 33.9 IN ADULT: ICD-10-CM

## 2021-05-14 DIAGNOSIS — G47.9 SLEEP DISTURBANCE: ICD-10-CM

## 2021-05-14 DIAGNOSIS — E78.2 MULTIPLE-TYPE HYPERLIPIDEMIA: ICD-10-CM

## 2021-05-14 DIAGNOSIS — E55.9 VITAMIN D DEFICIENCY: ICD-10-CM

## 2021-05-14 DIAGNOSIS — E66.09 CLASS 1 OBESITY DUE TO EXCESS CALORIES WITH SERIOUS COMORBIDITY AND BODY MASS INDEX (BMI) OF 33.0 TO 33.9 IN ADULT: ICD-10-CM

## 2021-05-14 DIAGNOSIS — I10 BENIGN ESSENTIAL HYPERTENSION: Primary | ICD-10-CM

## 2021-05-14 DIAGNOSIS — E66.811 CLASS 1 OBESITY DUE TO EXCESS CALORIES WITH SERIOUS COMORBIDITY AND BODY MASS INDEX (BMI) OF 34.0 TO 34.9 IN ADULT: ICD-10-CM

## 2021-05-14 DIAGNOSIS — E11.65 TYPE 2 DIABETES MELLITUS WITH HYPERGLYCEMIA, WITHOUT LONG-TERM CURRENT USE OF INSULIN (CMS/HCC): ICD-10-CM

## 2021-05-14 DIAGNOSIS — E66.09 CLASS 1 OBESITY DUE TO EXCESS CALORIES WITH SERIOUS COMORBIDITY AND BODY MASS INDEX (BMI) OF 34.0 TO 34.9 IN ADULT: ICD-10-CM

## 2021-05-14 PROCEDURE — 3008F BODY MASS INDEX DOCD: CPT | Performed by: FAMILY MEDICINE

## 2021-05-14 PROCEDURE — 99214 OFFICE O/P EST MOD 30 MIN: CPT | Performed by: FAMILY MEDICINE

## 2021-05-14 ASSESSMENT — ENCOUNTER SYMPTOMS
CONSTIPATION: 0
ARTHRALGIAS: 0
APPETITE CHANGE: 0
SLEEP DISTURBANCE: 1
DIARRHEA: 0
ACTIVITY CHANGE: 0
SHORTNESS OF BREATH: 0
SORE THROAT: 0
VOMITING: 0
HEMATURIA: 0
BACK PAIN: 0
NUMBNESS: 0
NERVOUS/ANXIOUS: 0
EYE PAIN: 0
DIZZINESS: 0
FEVER: 0
COUGH: 0
WHEEZING: 0
FATIGUE: 0
NAUSEA: 0
TROUBLE SWALLOWING: 0
FREQUENCY: 0
ABDOMINAL PAIN: 0

## 2021-05-14 NOTE — ASSESSMENT & PLAN NOTE
Cont statin  Lab Results   Component Value Date    CHOL 159 05/06/2021    CHOL 145 08/26/2020    CHOL 135 06/27/2020     Lab Results   Component Value Date    HDL 45 05/06/2021    HDL 41 08/26/2020    HDL 36 (L) 06/27/2020     Lab Results   Component Value Date    LDLCALC 97 05/06/2021    LDLCALC 84 08/26/2020    LDLCALC 67 06/27/2020     Lab Results   Component Value Date    TRIG 92 05/06/2021    TRIG 102 08/26/2020    TRIG 158 (H) 06/27/2020     No results found for: CHOLHDL

## 2021-05-14 NOTE — ASSESSMENT & PLAN NOTE
Controlled   Lab Results   Component Value Date    HGBA1C 6.7 (H) 05/06/2021     Follows with dr. dupont - will obtain report  Cont current regimen  On acei and statin

## 2021-05-14 NOTE — PROGRESS NOTES
Subjective      Patient ID: Fanny Reddy is a 71 y.o. female.    HPI     Chronic conditions  dm2 - stable, am sugars 100-139  htn - stable 120's/<80's  hld - stable  Obesity - stable    The following have been reviewed and updated as appropriate in this visit:  Allergies  Meds  Problems       Review of Systems   Constitutional: Negative for activity change, appetite change, fatigue and fever.   HENT: Negative for congestion, ear pain, postnasal drip, sore throat and trouble swallowing.    Eyes: Negative for pain.   Respiratory: Negative for cough, shortness of breath and wheezing.    Cardiovascular: Negative for chest pain.   Gastrointestinal: Negative for abdominal pain, constipation, diarrhea, nausea and vomiting.   Genitourinary: Negative for frequency and hematuria.   Musculoskeletal: Negative for arthralgias and back pain.   Skin: Negative for rash.   Neurological: Negative for dizziness and numbness.   Psychiatric/Behavioral: Positive for sleep disturbance. The patient is not nervous/anxious.        Social History     Socioeconomic History   • Marital status: Single     Spouse name: Not on file   • Number of children: Not on file   • Years of education: Not on file   • Highest education level: Not on file   Occupational History   • Not on file   Tobacco Use   • Smoking status: Never Smoker   • Smokeless tobacco: Never Used   Substance and Sexual Activity   • Alcohol use: Yes     Comment: socially at celebratory events   • Drug use: No   • Sexual activity: Never   Other Topics Concern   • Not on file   Social History Narrative   • Not on file     Social Determinants of Health     Financial Resource Strain:    • Difficulty of Paying Living Expenses:    Food Insecurity:    • Worried About Running Out of Food in the Last Year:    • Ran Out of Food in the Last Year:    Transportation Needs:    • Lack of Transportation (Medical):    • Lack of Transportation (Non-Medical):    Physical Activity:    • Days  "of Exercise per Week:    • Minutes of Exercise per Session:    Stress:    • Feeling of Stress :    Social Connections:    • Frequency of Communication with Friends and Family:    • Frequency of Social Gatherings with Friends and Family:    • Attends [employer] Services:    • Active Member of Clubs or Organizations:    • Attends Club or Organization Meetings:    • Marital Status:    Intimate Partner Violence:    • Fear of Current or Ex-Partner:    • Emotionally Abused:    • Physically Abused:    • Sexually Abused:        Objective     Vitals:    05/14/21 1405   BP: 124/74   Pulse: 100   Temp: (!) 35.8 °C (96.5 °F)   SpO2: 98%   Weight: 91.2 kg (201 lb)   Height: 1.626 m (5' 4\")     Body mass index is 34.5 kg/m².    Physical Exam  Constitutional:       Appearance: Normal appearance. She is well-developed.   HENT:      Head: Normocephalic and atraumatic.   Neck:      Thyroid: No thyromegaly.   Cardiovascular:      Rate and Rhythm: Normal rate and regular rhythm.      Heart sounds: Normal heart sounds. No murmur heard.   No gallop.    Pulmonary:      Effort: Pulmonary effort is normal. No respiratory distress.      Breath sounds: Normal breath sounds. No wheezing.   Musculoskeletal:      Right lower leg: No edema.      Left lower leg: No edema.   Skin:     General: Skin is warm and dry.      Findings: No erythema.   Neurological:      General: No focal deficit present.      Mental Status: She is alert and oriented to person, place, and time. Mental status is at baseline.   Psychiatric:         Mood and Affect: Mood normal.         Behavior: Behavior normal.         Thought Content: Thought content normal.         Judgment: Judgment normal.         Assessment/Plan   Problem List Items Addressed This Visit        Circulatory    Benign essential hypertension - Primary     Stable, cont current regimen  Counseled low salt/sodium diet and increased cardiovascular exercise           Relevant Orders    Hemoglobin A1c    " MICROALBUMIN, RANDOM URINE    Comprehensive metabolic panel    CBC and Differential    Lipid panel       Digestive    Vitamin D deficiency     Cont otc supplement         Relevant Orders    Hemoglobin A1c    MICROALBUMIN, RANDOM URINE    Comprehensive metabolic panel    CBC and Differential    Lipid panel       Endocrine/Metabolic    Type 2 diabetes mellitus with hyperglycemia (CMS/HCC)     Controlled   Lab Results   Component Value Date    HGBA1C 6.7 (H) 05/06/2021     Follows with dr. dupont - will obtain report  Cont current regimen  On acei and statin         Relevant Orders    Hemoglobin A1c    MICROALBUMIN, RANDOM URINE    Comprehensive metabolic panel    CBC and Differential    Lipid panel    Multiple-type hyperlipidemia     Cont statin  Lab Results   Component Value Date    CHOL 159 05/06/2021    CHOL 145 08/26/2020    CHOL 135 06/27/2020     Lab Results   Component Value Date    HDL 45 05/06/2021    HDL 41 08/26/2020    HDL 36 (L) 06/27/2020     Lab Results   Component Value Date    LDLCALC 97 05/06/2021    LDLCALC 84 08/26/2020    LDLCALC 67 06/27/2020     Lab Results   Component Value Date    TRIG 92 05/06/2021    TRIG 102 08/26/2020    TRIG 158 (H) 06/27/2020     No results found for: CHOLHDL           Relevant Orders    Hemoglobin A1c    MICROALBUMIN, RANDOM URINE    Comprehensive metabolic panel    CBC and Differential    Lipid panel    Class 1 obesity due to excess calories with body mass index (BMI) of 34.0 to 34.9 in adult     Encouraged DASH/ADA diet with caloric appropriate portioning and limiting salt intake to < 2000mg/day. Encouraged regular aerobic exercise for at least 120-150 mins/week for cardioprotective benefits. Discussed goal BMI < 30 for morbidity/mortality benefits.             RESOLVED: Class 1 obesity due to excess calories with serious comorbidity and body mass index (BMI) of 33.0 to 33.9 in adult     Encouraged DASH/ADA diet with caloric appropriate portioning and limiting salt  intake to < 2000mg/day. Encouraged regular aerobic exercise for at least 120-150 mins/week for cardioprotective benefits. Discussed goal BMI < 30 for morbidity/mortality benefits.             Relevant Orders    Hemoglobin A1c    MICROALBUMIN, RANDOM URINE    Comprehensive metabolic panel    CBC and Differential    Lipid panel      Other Visit Diagnoses     Sleep disturbance        cont otc melatonin, trazodone prn

## 2021-05-27 RX ORDER — OMEPRAZOLE 40 MG/1
40 CAPSULE, DELAYED RELEASE ORAL
Qty: 90 CAPSULE | Refills: 1 | Status: SHIPPED | OUTPATIENT
Start: 2021-05-27 | End: 2021-10-19

## 2021-05-28 RX ORDER — METFORMIN HYDROCHLORIDE 1000 MG/1
1000 TABLET ORAL 2 TIMES DAILY WITH MEALS
Qty: 180 TABLET | Refills: 1 | Status: SHIPPED | OUTPATIENT
Start: 2021-05-28 | End: 2021-10-19

## 2021-06-21 ENCOUNTER — TELEPHONE (OUTPATIENT)
Dept: FAMILY MEDICINE | Facility: CLINIC | Age: 71
End: 2021-06-21

## 2021-06-21 NOTE — TELEPHONE ENCOUNTER
Pt called and stated she is still having continuing problems with digestion. She said she sent a message on via [facility] as well.      Pt can be reached at [phone].

## 2021-06-29 ENCOUNTER — RX ONLY (OUTPATIENT)
Age: 71
Setting detail: RX ONLY
End: 2021-06-29

## 2021-06-29 RX ORDER — CRISABOROLE 20 MG/G
OINTMENT TOPICAL
Qty: 1 | Refills: 2 | Status: ERX | COMMUNITY
Start: 2021-06-29

## 2021-06-30 RX ORDER — ROSUVASTATIN CALCIUM 20 MG/1
TABLET, COATED ORAL
Qty: 90 TABLET | Refills: 1 | Status: SHIPPED | OUTPATIENT
Start: 2021-06-30 | End: 2021-07-01 | Stop reason: SDUPTHER

## 2021-06-30 RX ORDER — DAPAGLIFLOZIN 10 MG/1
TABLET, FILM COATED ORAL
Qty: 90 TABLET | Refills: 1 | Status: SHIPPED | OUTPATIENT
Start: 2021-06-30 | End: 2021-07-01 | Stop reason: SDUPTHER

## 2021-07-01 RX ORDER — DAPAGLIFLOZIN 10 MG/1
10 TABLET, FILM COATED ORAL
Qty: 90 TABLET | Refills: 1 | Status: SHIPPED | OUTPATIENT
Start: 2021-07-01 | End: 2021-11-15

## 2021-07-01 RX ORDER — ROSUVASTATIN CALCIUM 20 MG/1
20 TABLET, COATED ORAL
Qty: 90 TABLET | Refills: 1 | Status: SHIPPED | OUTPATIENT
Start: 2021-07-01 | End: 2022-02-07 | Stop reason: SDUPTHER

## 2021-07-07 ENCOUNTER — RX ONLY (OUTPATIENT)
Age: 71
Setting detail: RX ONLY
End: 2021-07-07

## 2021-07-07 RX ORDER — PIMECROLIMUS 10 MG/G
CREAM TOPICAL
Qty: 1 | Refills: 3 | Status: ERX | COMMUNITY
Start: 2021-07-07

## 2021-07-16 ENCOUNTER — APPOINTMENT (RX ONLY)
Dept: URBAN - METROPOLITAN AREA CLINIC 28 | Facility: CLINIC | Age: 71
Setting detail: DERMATOLOGY
End: 2021-07-16

## 2021-07-16 DIAGNOSIS — B35.1 TINEA UNGUIUM: ICD-10-CM | Status: RESOLVED

## 2021-07-16 DIAGNOSIS — L30.1 DYSHIDROSIS [POMPHOLYX]: ICD-10-CM | Status: IMPROVED

## 2021-07-16 PROCEDURE — 99213 OFFICE O/P EST LOW 20 MIN: CPT

## 2021-07-16 PROCEDURE — ? PRESCRIPTION MEDICATION MANAGEMENT

## 2021-07-16 PROCEDURE — ? COUNSELING

## 2021-07-16 ASSESSMENT — LOCATION DETAILED DESCRIPTION DERM
LOCATION DETAILED: RIGHT ULNAR PALM
LOCATION DETAILED: RIGHT INDEX FINGERNAIL
LOCATION DETAILED: LEFT INDEX FINGERNAIL
LOCATION DETAILED: LEFT RADIAL PALM

## 2021-07-16 ASSESSMENT — LOCATION ZONE DERM
LOCATION ZONE: HAND
LOCATION ZONE: FINGERNAIL

## 2021-07-16 ASSESSMENT — LOCATION SIMPLE DESCRIPTION DERM
LOCATION SIMPLE: LEFT INDEX FINGERNAIL
LOCATION SIMPLE: RIGHT HAND
LOCATION SIMPLE: LEFT HAND
LOCATION SIMPLE: RIGHT INDEX FINGERNAIL

## 2021-07-16 NOTE — PROCEDURE: PRESCRIPTION MEDICATION MANAGEMENT
Initiate Treatment: Cutemol emollient cream bid
Continue Regimen: clobetasol 0.05% topical ointment: Apply a thin layer to aa of hands BID prn flare\\nProtopic 0.1% topical ointment: Apply thin layer to hands qhs
Detail Level: Zone
Discontinue Regimen: betamethasone ointment\\nhydroxyzine HCl 10mg tablet: take 1 pill po qhs (did not get)
Render In Strict Bullet Format?: No
Discontinue Regimen: Jublia 10% topical solution with applicator: Apply a thin layer to affected nails QHS, take off once a week with a nail file

## 2021-08-09 RX ORDER — BLOOD SUGAR DIAGNOSTIC
STRIP MISCELLANEOUS
Qty: 200 STRIP | Refills: 4 | Status: SHIPPED | OUTPATIENT
Start: 2021-08-09 | End: 2022-06-28 | Stop reason: SDUPTHER

## 2021-08-23 RX ORDER — VALACYCLOVIR HYDROCHLORIDE 500 MG/1
TABLET, FILM COATED ORAL
Qty: 90 TABLET | Refills: 1 | Status: SHIPPED | OUTPATIENT
Start: 2021-08-23 | End: 2022-02-08

## 2021-08-23 RX ORDER — TRAZODONE HYDROCHLORIDE 50 MG/1
TABLET ORAL
Qty: 90 TABLET | Refills: 1 | Status: SHIPPED | OUTPATIENT
Start: 2021-08-23 | End: 2022-07-20 | Stop reason: SDUPTHER

## 2021-08-25 ENCOUNTER — APPOINTMENT (RX ONLY)
Dept: URBAN - METROPOLITAN AREA CLINIC 28 | Facility: CLINIC | Age: 71
Setting detail: DERMATOLOGY
End: 2021-08-25

## 2021-08-25 DIAGNOSIS — D17 BENIGN LIPOMATOUS NEOPLASM: ICD-10-CM

## 2021-08-25 PROBLEM — D17.24 BENIGN LIPOMATOUS NEOPLASM OF SKIN AND SUBCUTANEOUS TISSUE OF LEFT LEG: Status: ACTIVE | Noted: 2021-08-25

## 2021-08-25 PROCEDURE — ? COUNSELING

## 2021-08-25 PROCEDURE — ? OBSERVATION AND MEASURE

## 2021-08-25 PROCEDURE — ? DEFER

## 2021-08-25 PROCEDURE — 99212 OFFICE O/P EST SF 10 MIN: CPT

## 2021-08-25 ASSESSMENT — LOCATION SIMPLE DESCRIPTION DERM: LOCATION SIMPLE: LEFT THIGH

## 2021-08-25 ASSESSMENT — LOCATION DETAILED DESCRIPTION DERM: LOCATION DETAILED: LEFT ANTERIOR PROXIMAL THIGH

## 2021-08-25 ASSESSMENT — LOCATION ZONE DERM: LOCATION ZONE: LEG

## 2021-08-25 NOTE — PROCEDURE: DEFER
Detail Level: Detailed
Procedure To Be Performed At Next Visit: Excision
Introduction Text (Please End With A Colon): The following procedure was deferred: excision:

## 2021-10-06 ENCOUNTER — OFFICE VISIT (OUTPATIENT)
Dept: FAMILY MEDICINE | Facility: CLINIC | Age: 71
End: 2021-10-06
Payer: COMMERCIAL

## 2021-10-06 DIAGNOSIS — Z23 NEED FOR VACCINATION: ICD-10-CM

## 2021-10-06 DIAGNOSIS — Z23 NEED FOR VACCINATION: Primary | ICD-10-CM

## 2021-10-06 PROCEDURE — 90694 VACC AIIV4 NO PRSRV 0.5ML IM: CPT | Performed by: FAMILY MEDICINE

## 2021-10-06 PROCEDURE — 99999 PR OFFICE/OUTPT VISIT,PROCEDURE ONLY: CPT | Mod: 25 | Performed by: FAMILY MEDICINE

## 2021-10-06 PROCEDURE — G0008 ADMIN INFLUENZA VIRUS VAC: HCPCS | Performed by: FAMILY MEDICINE

## 2021-10-19 ENCOUNTER — IMMUNIZATION (OUTPATIENT)
Dept: IMMUNIZATION | Facility: CLINIC | Age: 71
End: 2021-10-19
Payer: COMMERCIAL

## 2021-10-19 PROCEDURE — 0004A COVID-19 (SARS-COV-2) VACCINE, PFIZER: CPT | Performed by: FAMILY MEDICINE

## 2021-10-19 PROCEDURE — 91300 COVID-19 (SARS-COV-2) VACCINE, PFIZER: CPT | Performed by: FAMILY MEDICINE

## 2021-10-19 RX ORDER — METFORMIN HYDROCHLORIDE 1000 MG/1
TABLET ORAL
Qty: 180 TABLET | Refills: 1 | Status: SHIPPED | OUTPATIENT
Start: 2021-10-19 | End: 2022-05-04

## 2021-10-19 RX ORDER — OMEPRAZOLE 40 MG/1
CAPSULE, DELAYED RELEASE ORAL
Qty: 90 CAPSULE | Refills: 1 | Status: SHIPPED | OUTPATIENT
Start: 2021-10-19 | End: 2022-05-07

## 2021-10-19 RX ORDER — LISINOPRIL AND HYDROCHLOROTHIAZIDE 10; 12.5 MG/1; MG/1
TABLET ORAL
Qty: 90 TABLET | Refills: 1 | Status: SHIPPED | OUTPATIENT
Start: 2021-10-19 | End: 2021-11-15

## 2021-11-11 LAB
ALBUMIN SERPL-MCNC: 4.3 G/DL (ref 3.8–4.8)
ALBUMIN/GLOB SERPL: 2.9 {RATIO} (ref 1.2–2.2)
ALP SERPL-CCNC: 67 IU/L (ref 44–121)
ALT SERPL-CCNC: 18 IU/L (ref 0–32)
AST SERPL-CCNC: 17 IU/L (ref 0–40)
BASOPHILS # BLD AUTO: 0.1 X10E3/UL (ref 0–0.2)
BASOPHILS NFR BLD AUTO: 1 %
BILIRUB SERPL-MCNC: 0.3 MG/DL (ref 0–1.2)
BUN SERPL-MCNC: 13 MG/DL (ref 8–27)
BUN/CREAT SERPL: 14 (ref 12–28)
CALCIUM SERPL-MCNC: 9.3 MG/DL (ref 8.7–10.3)
CHLORIDE SERPL-SCNC: 105 MMOL/L (ref 96–106)
CHOLEST SERPL-MCNC: 138 MG/DL (ref 100–199)
CO2 SERPL-SCNC: 25 MMOL/L (ref 20–29)
CREAT SERPL-MCNC: 0.91 MG/DL (ref 0.57–1)
EOSINOPHIL # BLD AUTO: 0.1 X10E3/UL (ref 0–0.4)
EOSINOPHIL NFR BLD AUTO: 4 %
ERYTHROCYTE [DISTWIDTH] IN BLOOD BY AUTOMATED COUNT: 14.2 % (ref 11.7–15.4)
GLOBULIN SER CALC-MCNC: 1.5 G/DL (ref 1.5–4.5)
GLUCOSE SERPL-MCNC: 104 MG/DL (ref 65–99)
HBA1C MFR BLD: 6.4 % (ref 4.8–5.6)
HCT VFR BLD AUTO: 40.8 % (ref 34–46.6)
HDLC SERPL-MCNC: 51 MG/DL
HGB BLD-MCNC: 13 G/DL (ref 11.1–15.9)
IMM GRANULOCYTES # BLD AUTO: 0 X10E3/UL (ref 0–0.1)
IMM GRANULOCYTES NFR BLD AUTO: 0 %
LAB CORP EGFR IF AFRICN AM: 74 ML/MIN/1.73
LAB CORP EGFR IF NONAFRICN AM: 64 ML/MIN/1.73
LDLC SERPL CALC-MCNC: 71 MG/DL (ref 0–99)
LYMPHOCYTES # BLD AUTO: 1.5 X10E3/UL (ref 0.7–3.1)
LYMPHOCYTES NFR BLD AUTO: 44 %
MCH RBC QN AUTO: 25.7 PG (ref 26.6–33)
MCHC RBC AUTO-ENTMCNC: 31.9 G/DL (ref 31.5–35.7)
MCV RBC AUTO: 81 FL (ref 79–97)
MICROALBUMIN UR-MCNC: <3 UG/ML
MONOCYTES # BLD AUTO: 0.3 X10E3/UL (ref 0.1–0.9)
MONOCYTES NFR BLD AUTO: 8 %
NEUTROPHILS # BLD AUTO: 1.6 X10E3/UL (ref 1.4–7)
NEUTROPHILS NFR BLD AUTO: 43 %
PLATELET # BLD AUTO: 287 X10E3/UL (ref 150–450)
POTASSIUM SERPL-SCNC: 4.2 MMOL/L (ref 3.5–5.2)
PROT SERPL-MCNC: 5.8 G/DL (ref 6–8.5)
RBC # BLD AUTO: 5.06 X10E6/UL (ref 3.77–5.28)
SODIUM SERPL-SCNC: 143 MMOL/L (ref 134–144)
TRIGL SERPL-MCNC: 83 MG/DL (ref 0–149)
VLDLC SERPL CALC-MCNC: 16 MG/DL (ref 5–40)
WBC # BLD AUTO: 3.6 X10E3/UL (ref 3.4–10.8)

## 2021-11-15 ENCOUNTER — OFFICE VISIT (OUTPATIENT)
Dept: FAMILY MEDICINE | Facility: CLINIC | Age: 71
End: 2021-11-15
Payer: COMMERCIAL

## 2021-11-15 ENCOUNTER — TELEPHONE (OUTPATIENT)
Dept: FAMILY MEDICINE | Facility: CLINIC | Age: 71
End: 2021-11-15

## 2021-11-15 VITALS
BODY MASS INDEX: 36.7 KG/M2 | WEIGHT: 199.4 LBS | OXYGEN SATURATION: 98 % | HEART RATE: 78 BPM | TEMPERATURE: 96.7 F | DIASTOLIC BLOOD PRESSURE: 72 MMHG | SYSTOLIC BLOOD PRESSURE: 120 MMHG | HEIGHT: 62 IN

## 2021-11-15 DIAGNOSIS — E11.65 TYPE 2 DIABETES MELLITUS WITH HYPERGLYCEMIA, WITHOUT LONG-TERM CURRENT USE OF INSULIN (CMS/HCC): ICD-10-CM

## 2021-11-15 DIAGNOSIS — E78.2 MULTIPLE-TYPE HYPERLIPIDEMIA: ICD-10-CM

## 2021-11-15 DIAGNOSIS — K58.8 OTHER IRRITABLE BOWEL SYNDROME: ICD-10-CM

## 2021-11-15 DIAGNOSIS — E66.01 CLASS 2 SEVERE OBESITY DUE TO EXCESS CALORIES WITH SERIOUS COMORBIDITY AND BODY MASS INDEX (BMI) OF 36.0 TO 36.9 IN ADULT (CMS/HCC): Primary | ICD-10-CM

## 2021-11-15 DIAGNOSIS — Z13.820 SCREENING FOR OSTEOPOROSIS: ICD-10-CM

## 2021-11-15 DIAGNOSIS — Z12.31 SCREENING MAMMOGRAM FOR BREAST CANCER: ICD-10-CM

## 2021-11-15 DIAGNOSIS — I10 BENIGN ESSENTIAL HYPERTENSION: ICD-10-CM

## 2021-11-15 DIAGNOSIS — E66.812 CLASS 2 SEVERE OBESITY DUE TO EXCESS CALORIES WITH SERIOUS COMORBIDITY AND BODY MASS INDEX (BMI) OF 36.0 TO 36.9 IN ADULT (CMS/HCC): Primary | ICD-10-CM

## 2021-11-15 PROCEDURE — G0439 PPPS, SUBSEQ VISIT: HCPCS | Performed by: FAMILY MEDICINE

## 2021-11-15 RX ORDER — DICYCLOMINE HYDROCHLORIDE 20 MG/1
TABLET ORAL
COMMUNITY
Start: 2021-10-21 | End: 2024-01-26

## 2021-11-15 RX ORDER — LOSARTAN POTASSIUM AND HYDROCHLOROTHIAZIDE 12.5; 5 MG/1; MG/1
1 TABLET ORAL DAILY
Qty: 90 TABLET | Refills: 1 | Status: SHIPPED | OUTPATIENT
Start: 2021-11-15 | End: 2022-05-04

## 2021-11-15 ASSESSMENT — MINI COG
COMPLETED: YES
TOTAL SCORE: 4

## 2021-11-15 ASSESSMENT — PATIENT HEALTH QUESTIONNAIRE - PHQ9: SUM OF ALL RESPONSES TO PHQ9 QUESTIONS 1 & 2: 0

## 2021-11-15 NOTE — PROGRESS NOTES
Subjective     Fanny Reddy is a 71 y.o. female who presents for a subsequent annual wellness visit.     Patient Care Team:  Lupe Mendez DO as PCP - General (Family Medicine)  Lupe Mendez DO    Comprehensive Medical and Social History  Patient Active Problem List   Diagnosis   • Benign essential hypertension   • Type 2 diabetes mellitus with hyperglycemia (CMS/HCC)   • Multiple-type hyperlipidemia   • Vitamin D deficiency   • Class 2 obesity due to excess calories with body mass index (BMI) of 36.0 to 36.9 in adult   • Routine general medical examination at a health care facility   • Localized swelling, mass or lump of neck   • Other irritable bowel syndrome     Past Medical History:   Diagnosis Date   • Cutaneous abscess of neck 2/7/2019   • Hypertension    • Lipid disorder    • Type 2 diabetes mellitus (CMS/HCC)      Past Surgical History:   Procedure Laterality Date   • HYSTERECTOMY       No Known Allergies  Current Outpatient Medications   Medication Sig Dispense Refill   • aspirin (ASPIR-LOW) 81 mg enteric coated tablet Take 1 tablet by mouth daily.     • CONTOUR NEXT TEST STRIPS strip USE TO TEST BLOOD GLUCOSE 2 TIMES DAILY 200 strip 4   • dicyclomine 20 mg tablet      • empagliflozin 10 mg tablet Take 1 tablet (10 mg total) by mouth daily. 90 tablet 1   • losartan-hydrochlorothiazide 50-12.5 mg per tablet Take 1 tablet by mouth daily. 90 tablet 1   • metFORMIN 1,000 mg tablet TAKE 1 TABLET BY MOUTH TWICE A DAY WITH MEALS 180 tablet 1   • omeprazole 40 mg capsule TAKE 1 CAPSULE BY MOUTH EVERY DAY 90 capsule 1   • rosuvastatin (CRESTOR) 20 mg tablet Take 1 tablet (20 mg total) by mouth once daily. 90 tablet 1   • traZODone (DESYREL) 50 mg tablet TAKE 1 SPLIT TABLET (25 MG TOTAL) BY MOUTH NIGHTLY. 90 tablet 1   • valACYclovir (VALTREX) 500 mg tablet TAKE 1 TABLET BY MOUTH EVERY DAY 90 tablet 1     No current facility-administered medications for this visit.     Social  "History     Tobacco Use   • Smoking status: Never Smoker   • Smokeless tobacco: Never Used   Substance Use Topics   • Alcohol use: Yes     Comment: socially at celebratory events   • Drug use: No     No family history on file.    Objective   Vitals  Vitals:    11/15/21 1250   BP: 120/72   Pulse: 78   Temp: (!) 35.9 °C (96.7 °F)   SpO2: 98%   Weight: 90.4 kg (199 lb 6.4 oz)   Height: 1.575 m (5' 2\")     Body mass index is 36.47 kg/m².    Advanced Care Plan  Does patient have advance directive?: Yes                                     PHQ  Will the patient answer the depression questions?: Yes   Little interest or pleasure in doing things: Not at all   Feeling down, depressed, or hopeless: Not at all   Depression Risk: 0                                             Mini Cog  Completed: Yes  Score: 4  Result: Negative      Get Up and Go  Result: Pass    STEADI Falls Risk  One or more falls in the last year: No           Has trouble stepping up onto a curb: No   Advised to use a cane or walker to get around safely: No   Often has to rush to the toilet: Yes   Feels unsteady when walking: No   Has lost some feeling in feet: No   Often feels sad or depressed: No   Steadies self on furniture while walking at home: No   Takes medication that makes him/her feel lightheaded or more tired than usual: No   Worried about falling: No   Takes medicine to sleep or improve mood: Yes (sometimes)   Needs to push with hands when rising from a chair: No   Falls screen completed: Yes     Hearing and Vision Screening  No exam data present  See HRA for relevant hearing screening response.    Assessment/Plan   Diagnoses and all orders for this visit:    Class 2 severe obesity due to excess calories with serious comorbidity and body mass index (BMI) of 36.0 to 36.9 in adult (CMS/[facility]) (Primary)  Assessment & Plan:  Encouraged DASH/ADA diet with caloric appropriate portioning and limiting salt intake to < 2000mg/day. Encouraged regular aerobic " exercise for at least 120-150 mins/week for cardioprotective benefits. Discussed goal BMI < 30 for morbidity/mortality benefits.          Screening mammogram for breast cancer  -     BI SCREENING MAMMOGRAM BILATERAL(TOMOSYNTHESIS); Future    Benign essential hypertension  Assessment & Plan:  Stable, cont current regimen  Counseled low salt/sodium diet and increased cardiovascular exercise        Other irritable bowel syndrome  Assessment & Plan:  Follows with Dr Willams  On dicyclomine   Has a list of foods to avoid - and doing well      Type 2 diabetes mellitus with hyperglycemia, without long-term current use of insulin (CMS/[facility])  Assessment & Plan:  Lab Results   Component Value Date    HGBA1C 6.4 (H) 11/10/2021     Cont statin and acei  Follows with dr dupont - will obtain report  Cont current regimen - per pt's insurance will switch from farxiga to jardiance d/t coverage      Screening for osteoporosis  -     DEXA BONE DENSITY; Future    Multiple-type hyperlipidemia  Assessment & Plan:  Cont rosuvastatin      Other orders  -     empagliflozin 10 mg tablet; Take 1 tablet (10 mg total) by mouth daily.  -     losartan-hydrochlorothiazide 50-12.5 mg per tablet; Take 1 tablet by mouth daily.      See Patient Instructions (the written plan) which was given to the patient for PPPS and health risk factors with interventions.     Colonoscopy utd  Flu utd  covid utd including booster  mammo and dexa rx provided  pna utd  tdap utd

## 2021-11-15 NOTE — PATIENT INSTRUCTIONS
Your Personalized Prevention Plan Services (PPPS)    Preventive Services Checklist (Assumes Average Risk Unless Otherwise Noted):    Health Maintenance Topics with due status: Overdue       Topic Date Due    Diabetic Foot Exam 03/27/2019    Zoster Vaccine 12/04/2019    Annual Dilated Retinal Exam 04/03/2020    Medicare Annual Wellness Visit 07/10/2021    DEXA Scan 10/18/2021     Health Maintenance Topics with due status: Not Due       Topic Last Completion Date    Colonoscopy 07/22/2020    DTaP, Tdap, and Td Vaccines 10/25/2020    Mammogram 11/16/2020    Hemoglobin A1C 11/10/2021    Urine Protein Screening 11/10/2021     Health Maintenance Topics with due status: Completed       Topic Last Completion Date    Pneumococcal 09/19/2017    Pneumococcal (65 years and older) 09/19/2017    Hepatitis C Screening 09/23/2019    Influenza Vaccine 10/06/2021    COVID-19 Vaccine 10/19/2021    Annual Falls Risk Screening 11/15/2021     Health Maintenance Topics with due status: Aged Out       Topic Date Due    Meningococcal ACWY Aged Out    HIB Vaccines Aged Out    IPV Vaccines Aged Out    HPV Vaccines Aged Out       You May Be Eligible for These Additional Preventive Services   (Assumes Average Risk Unless Otherwise Noted)  Diabetes Screening Any 1 risk factor: hypertension, dyslipidemia, obesity, high glucose; or Any 2 risk factors: >=66yo, overweight, family history diabetes (covered every 6 months)   Hepatitis C Screening Any 1 risk factor: 1) blood transfusion before 1992,   2) current or past injection drug use (annually for high risk; if born between [birth year]8605, see above for status).   Vaccine: Hepatitis B As necessary if at-risk: hemophilia, ESRD, diabetes, living with individual infected with hep B, healthcare worker with frequent contact with blood/bodily fluids (series covered once)   Sexually Transmitted Diseases (STDs) As necessary chlamydia, gonorrhea, syphilis, hepatitis B (covered  annually)  HIV if any 1 risk factor present: 1) <14yo or >64yo and at increased risk or 2) 15-64yo and ask for it (covered annually)   Lung Cancer Screening Low dose chest CT if all 3 risk factors: 1) 55-78yo, 2) smoker or quit within last 15y, 3) >=30 pack years (covered annually).  No results found for this or any previous visit.       Cholesterol Screening Both risk factors: 1) >=19yo and 2)  increased risk coronary artery disease (covered every 5 years).     Breast Cancer Screening Covered once 35-40yo, annually >=39yo (if >=51yo, see above for status).     Glaucoma Screening Any 1 risk factor: 1) diabetes, 2) family history glaucoma, 3)  >=51yo, 4)  American >=64yo (covered annually)           Health Risk Factors with Personalized Education:  ----------------------------------------------------------------------------------------------------------------------  Controlling Your Blood Pressure  · Maintain a normal weight (body mass index between 18.5 and 24.9).  · Eat more fruit, vegetables and low-fat dairy.  · Eat less saturated fat and total fat.  · Lower your sodium (salt) intake.  Try to stay under 1500 mg per day, but if you cannot get your intake to be that low, at least lower it by 1000 mg.  · Stay active.  Try to get at least 90 to 150 minutes of exercise per week.  Try brisk walking, swimming, bicycling or dancing.  · Limit alcohol intake.  When you do consume alcohol, drink no more than 1 drink per day.  · If you have been prescribed medication, take it regularly and exactly as prescribed.  Let your PCP know if you have any problems or questions about your medication.  · Check your blood pressure at home or at the store.  Write down your readings and share them with your PCP  ----------------------------------------------------------------------------------------------------------------------  Controlling Your Diabetes  · Stress can raise your blood sugar.  Learn what causes  your stress.  Learn to lower your stress by spending time with family and friends, exercising, deep breathing, trying meditation or yoga, enjoying hobbies and getting enough rest.  Let your PCP know if you’re having problems limiting your stress.  · Maintain a healthy weight by balancing your diet and exercise.  · Choose foods that are lower in calories, saturated fat, trans fat, sugar and salt.  Eat foods with more fiber, like whole grain cereals, bread, crackers, rice or pasta.  Choose to eat fruit, vegetables, and low-fat or fat-free/skim dairy.  · Reduce the portion size of your meals.  Make half of your meal vegetables and fruit, and divide the other half between lean protein and whole grains.  · Drink water instead of juice and regular soda.  · Spend at least some time being active on most days of the week.  · Work to increase your muscle strength at least twice per week.  Try things like light weights, stretch bands, yoga, heavy gardening (digging, planting with tools) or push-ups.  · If you have been prescribed medication, take it regularly and exactly as prescribed.  Let your PCP know if you have any problems or questions about your medication.  · If you have been asked to check your blood sugar, write down your readings and share them with your PCP  ----------------------------------------------------------------------------------------------------------------------  Controlling Your Cholesterol  · Reduce the amount of saturated and trans fat in your diet.  Limit intake of red meat.  Consume only low-fat or non-fat/skim dairy.  Limit fried food.  Cook with vegetable oils.  · Reduce your intake of sugary foods, sugary drinks and alcohol.  · Eat a diet high in fruit, vegetables and whole grains.  · Get protein from fish, poultry and a small portion of nuts.  · Stay active.  Try to get at least 90 to 150 minutes of exercise per week.  Try brisk walking, swimming, bicycling or dancing.  · Maintain a healthy  weight by balancing your diet and exercise.  · If you have been prescribed medication, take it regularly and exactly as prescribed. Let your PCP know if you have any problems or questions about your medication.  · It’s important to know your cholesterol numbers.  When recommended by your PCP, get the cholesterol blood test.  ----------------------------------------------------------------------------------------------------------------------  Maintaining Strong Bones  · Try to get at least 90 to 150 minutes of weight-bearing exercise per week.  · Ensure intake of at least 1200mg of calcium per day.  Eat foods high in calcium like milk and other dairy, green vegetables, fruit, canned fish with soft and edible bones, nuts, calcium-set tofu.  Some foods are calcium-fortified, like bread, cereal, fruit juices and mineral water.  · Help your body make vitamin D by getting 10-15 minutes per day of sunlight.    · Ensure intake of at least 600IU of vitamin D per day.  Eat foods high in vitamin D like oily fish (salmon, sardines, mackerel) and eggs.  Some foods are fortified with vitamin D, like dairy and cereals.  · Avoid high amounts of caffeine and salt, since they can cause the body to loose calcium.  · Limit alcohol intake, since it is associated with weaker bones and is associated with falls and fractures.  · Limit intake of fizzy drinks.  ----------------------------------------------------------------------------------------------------------------------  Reducing Your Risk of Falls  · Tell your PCP if any of your medications make you feel tired, dizzy, lightheaded or off-balance.  · Maintain coordination, flexibility and balance by ensuring regular physical activity.  · Limit alcohol intake to 1 drink per day.  Consider avoiding all alcohol intake.  · Ensure good vision.  Visit an ophthalmologist or optometrist regularly for vision screening or to make sure your glasses / contact lens prescription is correct.  If  you need glasses or contacts, wear them.  When you get new glasses or contacts, take time to get used to them.  Do not wear sunglasses or tinted lenses when indoors.  · Ensure good hearing.  Have your hearing checked if you are having trouble hearing, or family and friends think you cannot hear them.  If you need a hearing aid, be sure it fits well and wear it.  · Get enough rest.  Ensure about 7-9 hours of sleep every day.  · Get up slowly from your bed or chairs.  Do not start walking until you are sure you feel steady.  · Wear non-skid, rubber-soled, low-heeled shoes.  Do not walk in socks, or in shoes and slippers with smooth soles.  · If your PCP or therapist recommends using a cane or walker, use it regularly.  · Make your home safer.  Increase lighting throughout the house, especially at the top and bottom of stairs.  Ensure lighting is easily turned on when getting up in the middle of the night.  Make sure there are two secure rails on all stairs.  Install grab bars in the bathtub / shower and near the toilet.  Consider using a shower chair and / or a hand-held shower.  · Spread sand or salt on icy surfaces.  Beware of wet surfaces, which can be icy.  · Tell your PCP if you have fallen.

## 2021-12-01 ENCOUNTER — HOSPITAL ENCOUNTER (OUTPATIENT)
Dept: RADIOLOGY | Facility: HOSPITAL | Age: 71
Discharge: HOME | End: 2021-12-01
Attending: FAMILY MEDICINE
Payer: COMMERCIAL

## 2021-12-01 DIAGNOSIS — Z12.31 SCREENING MAMMOGRAM FOR BREAST CANCER: ICD-10-CM

## 2021-12-01 PROCEDURE — 77067 SCR MAMMO BI INCL CAD: CPT

## 2021-12-06 LAB — AMB MLHC EXTERNAL DIABETIC EYE EXAMS: NEGATIVE

## 2021-12-08 ENCOUNTER — HOSPITAL ENCOUNTER (OUTPATIENT)
Dept: RADIOLOGY | Facility: HOSPITAL | Age: 71
Discharge: HOME | End: 2021-12-08
Attending: FAMILY MEDICINE
Payer: COMMERCIAL

## 2021-12-08 DIAGNOSIS — Z13.820 SCREENING FOR OSTEOPOROSIS: ICD-10-CM

## 2021-12-08 PROCEDURE — 77080 DXA BONE DENSITY AXIAL: CPT

## 2022-01-11 ENCOUNTER — APPOINTMENT (RX ONLY)
Dept: URBAN - METROPOLITAN AREA CLINIC 28 | Facility: CLINIC | Age: 72
Setting detail: DERMATOLOGY
End: 2022-01-11

## 2022-01-11 DIAGNOSIS — L30.1 DYSHIDROSIS [POMPHOLYX]: ICD-10-CM | Status: WORSENING

## 2022-01-11 PROCEDURE — ? PHOTO-DOCUMENTATION

## 2022-01-11 PROCEDURE — ? PRESCRIPTION MEDICATION MANAGEMENT

## 2022-01-11 PROCEDURE — ? ADDITIONAL NOTES

## 2022-01-11 PROCEDURE — 99214 OFFICE O/P EST MOD 30 MIN: CPT

## 2022-01-11 PROCEDURE — ? COUNSELING

## 2022-01-11 PROCEDURE — ? PRESCRIPTION

## 2022-01-11 RX ORDER — PREDNISONE 10 MG/1
TABLET ORAL
Qty: 20 | Refills: 0 | Status: ERX | COMMUNITY
Start: 2022-01-11

## 2022-01-11 RX ADMIN — PREDNISONE: 10 TABLET ORAL at 00:00

## 2022-01-11 ASSESSMENT — LOCATION DETAILED DESCRIPTION DERM
LOCATION DETAILED: LEFT RADIAL PALM
LOCATION DETAILED: RIGHT ULNAR PALM

## 2022-01-11 ASSESSMENT — LOCATION SIMPLE DESCRIPTION DERM
LOCATION SIMPLE: RIGHT HAND
LOCATION SIMPLE: LEFT HAND

## 2022-01-11 ASSESSMENT — LOCATION ZONE DERM: LOCATION ZONE: HAND

## 2022-01-11 NOTE — PROCEDURE: ADDITIONAL NOTES
Render Risk Assessment In Note?: yes
Additional Notes: Patient consent obtained to proceed with the visit and recommend plan of care after discussion of all risks and benefits, including the risks of COVID-19 exposure.
Detail Level: Zone

## 2022-01-11 NOTE — PROCEDURE: PRESCRIPTION MEDICATION MANAGEMENT
Initiate Treatment: prednisone 10mmg for 2 days, 30mg for 2 days, 20mg for 2 days, 10mg for 2 days
Continue Regimen: clobetasol 0.05% topical ointment: Apply a thin layer to aa of hands BID prn flare\\nTacrolimus 0.1% topical ointment: Apply thin layer to hands qhs\\nCutemol emollient cream bid
Samples Given: Cutemol cream
Detail Level: Zone
Discontinue Regimen: betamethasone ointment\\nhydroxyzine HCl 10mg tablet: take 1 pill po qhs (did not get)
Render In Strict Bullet Format?: No

## 2022-02-08 RX ORDER — VALACYCLOVIR HYDROCHLORIDE 500 MG/1
TABLET, FILM COATED ORAL
Qty: 90 TABLET | Refills: 1 | Status: SHIPPED | OUTPATIENT
Start: 2022-02-08 | End: 2022-06-28

## 2022-02-08 RX ORDER — ROSUVASTATIN CALCIUM 20 MG/1
20 TABLET, COATED ORAL
Qty: 90 TABLET | Refills: 1 | Status: SHIPPED | OUTPATIENT
Start: 2022-02-08 | End: 2022-11-03

## 2022-04-12 ENCOUNTER — APPOINTMENT (RX ONLY)
Dept: URBAN - METROPOLITAN AREA CLINIC 28 | Facility: CLINIC | Age: 72
Setting detail: DERMATOLOGY
End: 2022-04-12

## 2022-04-12 DIAGNOSIS — D17 BENIGN LIPOMATOUS NEOPLASM: ICD-10-CM

## 2022-04-12 DIAGNOSIS — L30.1 DYSHIDROSIS [POMPHOLYX]: ICD-10-CM | Status: WELL CONTROLLED

## 2022-04-12 PROBLEM — D48.5 NEOPLASM OF UNCERTAIN BEHAVIOR OF SKIN: Status: ACTIVE | Noted: 2022-04-12

## 2022-04-12 PROCEDURE — ? ADDITIONAL NOTES

## 2022-04-12 PROCEDURE — 99213 OFFICE O/P EST LOW 20 MIN: CPT

## 2022-04-12 PROCEDURE — ? DEFER

## 2022-04-12 PROCEDURE — ? RECOMMENDATIONS

## 2022-04-12 PROCEDURE — ? PRESCRIPTION MEDICATION MANAGEMENT

## 2022-04-12 PROCEDURE — ? COUNSELING

## 2022-04-12 ASSESSMENT — LOCATION ZONE DERM
LOCATION ZONE: HAND
LOCATION ZONE: LEG

## 2022-04-12 ASSESSMENT — LOCATION SIMPLE DESCRIPTION DERM
LOCATION SIMPLE: LEFT KNEE
LOCATION SIMPLE: LEFT HAND
LOCATION SIMPLE: RIGHT HAND

## 2022-04-12 ASSESSMENT — LOCATION DETAILED DESCRIPTION DERM
LOCATION DETAILED: LEFT MEDIAL KNEE
LOCATION DETAILED: RIGHT ULNAR PALM
LOCATION DETAILED: LEFT RADIAL PALM

## 2022-04-12 NOTE — PROCEDURE: PRESCRIPTION MEDICATION MANAGEMENT
Continue Regimen: Cutemol emollient cream bid
Detail Level: Zone
Discontinue Regimen: clobetasol 0.05% topical ointment: Apply a thin layer to aa of hands BID prn flare\\nTacrolimus 0.1% topical ointment: Apply thin layer to hands qhs\\nprednisone 10mmg for 2 days, 30mg for 2 days, 20mg for 2 days, 10mg for 2 days
Render In Strict Bullet Format?: No

## 2022-04-12 NOTE — PROCEDURE: RECOMMENDATIONS
Detail Level: Zone
Recommendation Preamble: The following recommendations were made during the visit: if spot is still there when patient sees her primary in May she will ask for an ultrasound.
Render Risk Assessment In Note?: no

## 2022-05-04 RX ORDER — METFORMIN HYDROCHLORIDE 1000 MG/1
TABLET ORAL
Qty: 180 TABLET | Refills: 1 | Status: SHIPPED | OUTPATIENT
Start: 2022-05-04 | End: 2022-12-16 | Stop reason: SDUPTHER

## 2022-05-04 RX ORDER — LOSARTAN POTASSIUM AND HYDROCHLOROTHIAZIDE 12.5; 5 MG/1; MG/1
TABLET ORAL
Qty: 90 TABLET | Refills: 1 | Status: SHIPPED | OUTPATIENT
Start: 2022-05-04 | End: 2023-01-27 | Stop reason: SDUPTHER

## 2022-05-07 RX ORDER — OMEPRAZOLE 40 MG/1
CAPSULE, DELAYED RELEASE ORAL
Qty: 90 CAPSULE | Refills: 1 | Status: SHIPPED | OUTPATIENT
Start: 2022-05-07 | End: 2022-11-03

## 2022-05-16 ENCOUNTER — OFFICE VISIT (OUTPATIENT)
Dept: FAMILY MEDICINE | Facility: CLINIC | Age: 72
End: 2022-05-16
Payer: COMMERCIAL

## 2022-05-16 VITALS
OXYGEN SATURATION: 98 % | DIASTOLIC BLOOD PRESSURE: 72 MMHG | SYSTOLIC BLOOD PRESSURE: 120 MMHG | BODY MASS INDEX: 36.29 KG/M2 | TEMPERATURE: 97.8 F | HEART RATE: 94 BPM | WEIGHT: 198.4 LBS

## 2022-05-16 DIAGNOSIS — E78.2 MULTIPLE-TYPE HYPERLIPIDEMIA: ICD-10-CM

## 2022-05-16 DIAGNOSIS — E11.65 TYPE 2 DIABETES MELLITUS WITH HYPERGLYCEMIA, WITHOUT LONG-TERM CURRENT USE OF INSULIN (CMS/HCC): Primary | ICD-10-CM

## 2022-05-16 DIAGNOSIS — I10 BENIGN ESSENTIAL HYPERTENSION: ICD-10-CM

## 2022-05-16 DIAGNOSIS — E66.812 CLASS 2 SEVERE OBESITY DUE TO EXCESS CALORIES WITH SERIOUS COMORBIDITY AND BODY MASS INDEX (BMI) OF 36.0 TO 36.9 IN ADULT (CMS/HCC): ICD-10-CM

## 2022-05-16 DIAGNOSIS — E66.01 CLASS 2 SEVERE OBESITY DUE TO EXCESS CALORIES WITH SERIOUS COMORBIDITY AND BODY MASS INDEX (BMI) OF 36.0 TO 36.9 IN ADULT (CMS/HCC): ICD-10-CM

## 2022-05-16 PROCEDURE — 99214 OFFICE O/P EST MOD 30 MIN: CPT | Performed by: FAMILY MEDICINE

## 2022-05-16 PROCEDURE — 3008F BODY MASS INDEX DOCD: CPT | Performed by: FAMILY MEDICINE

## 2022-05-16 ASSESSMENT — ENCOUNTER SYMPTOMS
ARTHRALGIAS: 0
TROUBLE SWALLOWING: 0
ABDOMINAL PAIN: 0
VOMITING: 0
WHEEZING: 0
SHORTNESS OF BREATH: 0
EYE PAIN: 0
DIARRHEA: 0
FEVER: 0
SORE THROAT: 0
NERVOUS/ANXIOUS: 0
NUMBNESS: 0
BACK PAIN: 0
NAUSEA: 0
COUGH: 0
FREQUENCY: 0
CONSTIPATION: 0
FATIGUE: 0
HEMATURIA: 0
DIZZINESS: 0
ACTIVITY CHANGE: 0
APPETITE CHANGE: 0

## 2022-05-16 NOTE — PROGRESS NOTES
Subjective      Patient ID: Fanny Reddy is a 72 y.o. female.    HPI     Chronic conditions  dm2 - stable  htn - stable  hld - stable    The following have been reviewed and updated as appropriate in this visit:   Allergies  Meds  Problems         Review of Systems   Constitutional: Negative for activity change, appetite change, fatigue and fever.   HENT: Negative for congestion, ear pain, postnasal drip, sore throat and trouble swallowing.    Eyes: Negative for pain.   Respiratory: Negative for cough, shortness of breath and wheezing.    Cardiovascular: Negative for chest pain.   Gastrointestinal: Negative for abdominal pain, constipation, diarrhea, nausea and vomiting.   Genitourinary: Negative for frequency and hematuria.   Musculoskeletal: Negative for arthralgias and back pain.   Skin: Negative for rash.   Neurological: Negative for dizziness and numbness.   Psychiatric/Behavioral: The patient is not nervous/anxious.        Social History     Socioeconomic History   • Marital status: Single     Spouse name: Not on file   • Number of children: Not on file   • Years of education: Not on file   • Highest education level: Not on file   Occupational History   • Not on file   Tobacco Use   • Smoking status: Never Smoker   • Smokeless tobacco: Never Used   Substance and Sexual Activity   • Alcohol use: Yes     Comment: socially at celebratory events   • Drug use: No   • Sexual activity: Never   Other Topics Concern   • Not on file   Social History Narrative   • Not on file     Social Determinants of Health     Financial Resource Strain: Not on file   Food Insecurity: Not on file   Transportation Needs: Not on file   Physical Activity: Not on file   Stress: Not on file   Social Connections: Not on file   Intimate Partner Violence: Not on file   Housing Stability: Not on file       Objective     Vitals:    05/16/22 1346   BP: 120/72   Pulse: 94   Temp: 36.6 °C (97.8 °F)   SpO2: 98%   Weight: 90 kg (198 lb 6.4  oz)     Body mass index is 36.29 kg/m².    Physical Exam  Constitutional:       Appearance: Normal appearance. She is well-developed. She is obese.   HENT:      Head: Normocephalic and atraumatic.   Neck:      Thyroid: No thyromegaly.   Cardiovascular:      Rate and Rhythm: Normal rate and regular rhythm.      Heart sounds: Normal heart sounds. No murmur heard.    No gallop.   Pulmonary:      Effort: Pulmonary effort is normal. No respiratory distress.      Breath sounds: Normal breath sounds. No wheezing.   Musculoskeletal:      Right lower leg: No edema.      Left lower leg: No edema.   Skin:     General: Skin is warm and dry.      Findings: No erythema or rash.   Neurological:      General: No focal deficit present.      Mental Status: She is alert and oriented to person, place, and time. Mental status is at baseline.   Psychiatric:         Mood and Affect: Mood normal.         Behavior: Behavior normal.         Thought Content: Thought content normal.         Judgment: Judgment normal.         Assessment/Plan   Problem List Items Addressed This Visit        Circulatory    Benign essential hypertension     Stable, cont current regimen  Counseled low salt/sodium diet and increased cardiovascular exercise             Relevant Orders    CBC and Differential    Comprehensive metabolic panel    Hemoglobin A1c    MICROALBUMIN, RANDOM URINE    Lipid panel       Endocrine/Metabolic    Type 2 diabetes mellitus with hyperglycemia (CMS/HCC) - Primary     Controlled  F/u a1c  Cont statin and acei  Follows with dr. kiah Villasenor current regimen           Relevant Orders    CBC and Differential    Comprehensive metabolic panel    Hemoglobin A1c    MICROALBUMIN, RANDOM URINE    Lipid panel    Multiple-type hyperlipidemia     Cont statin  F/u lipid panel           Class 2 obesity due to excess calories with body mass index (BMI) of 36.0 to 36.9 in adult     Encouraged DASH/ADA diet with caloric appropriate portioning and limiting  salt intake to < 2000mg/day. Encouraged regular aerobic exercise for at least 120-150 mins/week for cardioprotective benefits. Discussed goal BMI < 30 for morbidity/mortality benefits.               Relevant Orders    CBC and Differential    Comprehensive metabolic panel    Hemoglobin A1c    MICROALBUMIN, RANDOM URINE    Lipid panel

## 2022-06-28 RX ORDER — BLOOD SUGAR DIAGNOSTIC
STRIP MISCELLANEOUS
Qty: 200 STRIP | Refills: 3 | Status: SHIPPED | OUTPATIENT
Start: 2022-06-28 | End: 2024-10-16 | Stop reason: SDUPTHER

## 2022-06-28 RX ORDER — VALACYCLOVIR HYDROCHLORIDE 500 MG/1
TABLET, FILM COATED ORAL
Qty: 90 TABLET | Refills: 1 | Status: SHIPPED | OUTPATIENT
Start: 2022-06-28 | End: 2022-12-21

## 2022-06-30 RX ORDER — TRIAMCINOLONE ACETONIDE 1 MG/G
OINTMENT TOPICAL
Qty: 60 G | Refills: 1 | Status: SHIPPED | OUTPATIENT
Start: 2022-06-30 | End: 2022-09-27

## 2022-07-07 ENCOUNTER — RX ONLY (OUTPATIENT)
Age: 72
Setting detail: RX ONLY
End: 2022-07-07

## 2022-07-07 RX ORDER — PREDNISONE 10 MG/1
TABLET ORAL QDAY
Qty: 20 | Refills: 0 | Status: ERX | COMMUNITY
Start: 2022-07-07

## 2022-07-07 RX ORDER — CLOBETASOL PROPIONATE 0.5 MG/G
OINTMENT TOPICAL BID
Qty: 60 | Refills: 3 | Status: ERX | COMMUNITY
Start: 2022-07-07

## 2022-07-20 RX ORDER — TRAZODONE HYDROCHLORIDE 50 MG/1
TABLET ORAL
Qty: 90 TABLET | Refills: 1 | Status: SHIPPED | OUTPATIENT
Start: 2022-07-20 | End: 2022-11-29

## 2022-07-27 LAB
ALBUMIN SERPL-MCNC: 4.6 G/DL (ref 3.7–4.7)
ALBUMIN/GLOB SERPL: 2.3 {RATIO} (ref 1.2–2.2)
ALP SERPL-CCNC: 79 IU/L (ref 44–121)
ALT SERPL-CCNC: 19 IU/L (ref 0–32)
AST SERPL-CCNC: 21 IU/L (ref 0–40)
BASOPHILS # BLD AUTO: 0 X10E3/UL (ref 0–0.2)
BASOPHILS NFR BLD AUTO: 1 %
BILIRUB SERPL-MCNC: 0.4 MG/DL (ref 0–1.2)
BUN SERPL-MCNC: 14 MG/DL (ref 8–27)
BUN/CREAT SERPL: 15 (ref 12–28)
CALCIUM SERPL-MCNC: 9.6 MG/DL (ref 8.7–10.3)
CHLORIDE SERPL-SCNC: 97 MMOL/L (ref 96–106)
CHOLEST SERPL-MCNC: 148 MG/DL (ref 100–199)
CO2 SERPL-SCNC: 24 MMOL/L (ref 20–29)
CREAT SERPL-MCNC: 0.93 MG/DL (ref 0.57–1)
EGFRCR SERPLBLD CKD-EPI 2021: 66 ML/MIN/1.73
EOSINOPHIL # BLD AUTO: 0.1 X10E3/UL (ref 0–0.4)
EOSINOPHIL NFR BLD AUTO: 2 %
ERYTHROCYTE [DISTWIDTH] IN BLOOD BY AUTOMATED COUNT: 14.2 % (ref 11.7–15.4)
GLOBULIN SER CALC-MCNC: 2 G/DL (ref 1.5–4.5)
GLUCOSE SERPL-MCNC: 112 MG/DL (ref 65–99)
HBA1C MFR BLD: 7.2 % (ref 4.8–5.6)
HCT VFR BLD AUTO: 41.3 % (ref 34–46.6)
HDLC SERPL-MCNC: 49 MG/DL
HGB BLD-MCNC: 13.7 G/DL (ref 11.1–15.9)
IMM GRANULOCYTES # BLD AUTO: 0 X10E3/UL (ref 0–0.1)
IMM GRANULOCYTES NFR BLD AUTO: 0 %
LDLC SERPL CALC-MCNC: 82 MG/DL (ref 0–99)
LYMPHOCYTES # BLD AUTO: 1.6 X10E3/UL (ref 0.7–3.1)
LYMPHOCYTES NFR BLD AUTO: 35 %
MCH RBC QN AUTO: 25.6 PG (ref 26.6–33)
MCHC RBC AUTO-ENTMCNC: 33.2 G/DL (ref 31.5–35.7)
MCV RBC AUTO: 77 FL (ref 79–97)
MICROALBUMIN UR-MCNC: <3 UG/ML
MONOCYTES # BLD AUTO: 0.4 X10E3/UL (ref 0.1–0.9)
MONOCYTES NFR BLD AUTO: 8 %
NEUTROPHILS # BLD AUTO: 2.5 X10E3/UL (ref 1.4–7)
NEUTROPHILS NFR BLD AUTO: 54 %
PLATELET # BLD AUTO: 280 X10E3/UL (ref 150–450)
POTASSIUM SERPL-SCNC: 3.8 MMOL/L (ref 3.5–5.2)
PROT SERPL-MCNC: 6.6 G/DL (ref 6–8.5)
RBC # BLD AUTO: 5.36 X10E6/UL (ref 3.77–5.28)
SODIUM SERPL-SCNC: 136 MMOL/L (ref 134–144)
TRIGL SERPL-MCNC: 91 MG/DL (ref 0–149)
VLDLC SERPL CALC-MCNC: 17 MG/DL (ref 5–40)
WBC # BLD AUTO: 4.7 X10E3/UL (ref 3.4–10.8)

## 2022-08-02 ENCOUNTER — RX ONLY (OUTPATIENT)
Age: 72
Setting detail: RX ONLY
End: 2022-08-02

## 2022-08-02 ENCOUNTER — APPOINTMENT (RX ONLY)
Dept: URBAN - METROPOLITAN AREA CLINIC 28 | Facility: CLINIC | Age: 72
Setting detail: DERMATOLOGY
End: 2022-08-02

## 2022-08-02 DIAGNOSIS — L30.1 DYSHIDROSIS [POMPHOLYX]: ICD-10-CM | Status: IMPROVED

## 2022-08-02 PROCEDURE — 99213 OFFICE O/P EST LOW 20 MIN: CPT

## 2022-08-02 PROCEDURE — ? PRESCRIPTION

## 2022-08-02 PROCEDURE — ? PRESCRIPTION MEDICATION MANAGEMENT

## 2022-08-02 PROCEDURE — ? PRESCRIPTION SAMPLES PROVIDED

## 2022-08-02 PROCEDURE — ? PHOTO-DOCUMENTATION

## 2022-08-02 PROCEDURE — ? COUNSELING

## 2022-08-02 RX ORDER — TACROLIMUS 0.3 MG/G
1 OINTMENT TOPICAL
Qty: 60 | Refills: 2 | Status: ERX | COMMUNITY
Start: 2022-08-02

## 2022-08-02 RX ORDER — CRISABOROLE 20 MG/G
OINTMENT TOPICAL QDAY
Qty: 60 | Refills: 2 | Status: ERX | COMMUNITY
Start: 2022-08-02

## 2022-08-02 RX ADMIN — CRISABOROLE: 20 OINTMENT TOPICAL at 00:00

## 2022-08-02 ASSESSMENT — LOCATION DETAILED DESCRIPTION DERM
LOCATION DETAILED: RIGHT ULNAR PALM
LOCATION DETAILED: LEFT RADIAL PALM

## 2022-08-02 ASSESSMENT — LOCATION ZONE DERM: LOCATION ZONE: HAND

## 2022-08-02 ASSESSMENT — LOCATION SIMPLE DESCRIPTION DERM
LOCATION SIMPLE: LEFT HAND
LOCATION SIMPLE: RIGHT HAND

## 2022-08-02 NOTE — PROCEDURE: PRESCRIPTION MEDICATION MANAGEMENT
Initiate Treatment: Eucrisa 2% topical ointment: Apply QDAY to the eczema of the hands as maintenance
Continue Regimen: Cutemol emollient cream bid\\nclobetasol 0.05% topical ointment: Apply a thin layer to aa of hands BID PRN flare
Samples Given: Cutemol cream
Detail Level: Zone
Render In Strict Bullet Format?: No

## 2022-08-02 NOTE — PROCEDURE: MIPS QUALITY
Quality 226: Preventive Care And Screening: Tobacco Use: Screening And Cessation Intervention: Patient screened for tobacco use and is an ex/non-smoker
Quality 111:Pneumonia Vaccination Status For Older Adults: Pneumococcal vaccine administered on or after patient’s 60th birthday and before the end of the measurement period
Quality 431: Preventive Care And Screening: Unhealthy Alcohol Use - Screening: Patient not identified as an unhealthy alcohol user when screened for unhealthy alcohol use using a systematic screening method
Detail Level: Detailed
Quality 130: Documentation Of Current Medications In The Medical Record: Current Medications Documented

## 2022-09-26 ENCOUNTER — OFFICE VISIT (OUTPATIENT)
Dept: FAMILY MEDICINE | Facility: CLINIC | Age: 72
End: 2022-09-26
Payer: COMMERCIAL

## 2022-09-26 DIAGNOSIS — Z23 NEED FOR VACCINATION: Primary | ICD-10-CM

## 2022-09-26 PROCEDURE — 99999 PR OFFICE/OUTPT VISIT,PROCEDURE ONLY: CPT | Mod: 25 | Performed by: FAMILY MEDICINE

## 2022-09-26 PROCEDURE — G0008 ADMIN INFLUENZA VIRUS VAC: HCPCS | Performed by: FAMILY MEDICINE

## 2022-09-26 PROCEDURE — 90694 VACC AIIV4 NO PRSRV 0.5ML IM: CPT | Performed by: FAMILY MEDICINE

## 2022-10-24 DIAGNOSIS — E11.65 TYPE 2 DIABETES MELLITUS WITH HYPERGLYCEMIA, WITHOUT LONG-TERM CURRENT USE OF INSULIN (CMS/HCC): ICD-10-CM

## 2022-10-24 DIAGNOSIS — I10 BENIGN ESSENTIAL HYPERTENSION: Primary | ICD-10-CM

## 2022-11-03 RX ORDER — OMEPRAZOLE 40 MG/1
CAPSULE, DELAYED RELEASE ORAL
Qty: 90 CAPSULE | Refills: 1 | Status: SHIPPED | OUTPATIENT
Start: 2022-11-03 | End: 2023-04-03

## 2022-11-03 RX ORDER — ROSUVASTATIN CALCIUM 20 MG/1
TABLET, COATED ORAL
Qty: 90 TABLET | Refills: 1 | Status: SHIPPED | OUTPATIENT
Start: 2022-11-03 | End: 2023-04-03

## 2022-11-17 LAB
ALBUMIN SERPL-MCNC: 4.7 G/DL (ref 3.7–4.7)
ALBUMIN/CREAT UR: 4 MG/G CREAT (ref 0–29)
ALBUMIN/GLOB SERPL: 2.6 {RATIO} (ref 1.2–2.2)
ALP SERPL-CCNC: 90 IU/L (ref 44–121)
ALT SERPL-CCNC: 23 IU/L (ref 0–32)
AST SERPL-CCNC: 23 IU/L (ref 0–40)
BASOPHILS # BLD AUTO: 0.1 X10E3/UL (ref 0–0.2)
BASOPHILS NFR BLD AUTO: 1 %
BILIRUB SERPL-MCNC: 0.4 MG/DL (ref 0–1.2)
BUN SERPL-MCNC: 13 MG/DL (ref 8–27)
BUN/CREAT SERPL: 14 (ref 12–28)
CALCIUM SERPL-MCNC: 9.7 MG/DL (ref 8.7–10.3)
CHLORIDE SERPL-SCNC: 101 MMOL/L (ref 96–106)
CHOLEST SERPL-MCNC: 167 MG/DL (ref 100–199)
CO2 SERPL-SCNC: 23 MMOL/L (ref 20–29)
CREAT SERPL-MCNC: 0.95 MG/DL (ref 0.57–1)
CREAT UR-MCNC: 145.7 MG/DL
EGFRCR SERPLBLD CKD-EPI 2021: 64 ML/MIN/1.73
EOSINOPHIL # BLD AUTO: 0.2 X10E3/UL (ref 0–0.4)
EOSINOPHIL NFR BLD AUTO: 3 %
ERYTHROCYTE [DISTWIDTH] IN BLOOD BY AUTOMATED COUNT: 14.3 % (ref 11.7–15.4)
GLOBULIN SER CALC-MCNC: 1.8 G/DL (ref 1.5–4.5)
GLUCOSE SERPL-MCNC: 121 MG/DL (ref 70–99)
HBA1C MFR BLD: 6.7 % (ref 4.8–5.6)
HCT VFR BLD AUTO: 43.2 % (ref 34–46.6)
HDLC SERPL-MCNC: 47 MG/DL
HGB BLD-MCNC: 13.8 G/DL (ref 11.1–15.9)
IMM GRANULOCYTES # BLD AUTO: 0 X10E3/UL (ref 0–0.1)
IMM GRANULOCYTES NFR BLD AUTO: 0 %
LDLC SERPL CALC-MCNC: 104 MG/DL (ref 0–99)
LYMPHOCYTES # BLD AUTO: 2 X10E3/UL (ref 0.7–3.1)
LYMPHOCYTES NFR BLD AUTO: 40 %
MCH RBC QN AUTO: 25 PG (ref 26.6–33)
MCHC RBC AUTO-ENTMCNC: 31.9 G/DL (ref 31.5–35.7)
MCV RBC AUTO: 78 FL (ref 79–97)
MICROALBUMIN UR-MCNC: 5.6 UG/ML
MONOCYTES # BLD AUTO: 0.4 X10E3/UL (ref 0.1–0.9)
MONOCYTES NFR BLD AUTO: 8 %
NEUTROPHILS # BLD AUTO: 2.3 X10E3/UL (ref 1.4–7)
NEUTROPHILS NFR BLD AUTO: 48 %
PLATELET # BLD AUTO: 339 X10E3/UL (ref 150–450)
POTASSIUM SERPL-SCNC: 4.6 MMOL/L (ref 3.5–5.2)
PROT SERPL-MCNC: 6.5 G/DL (ref 6–8.5)
RBC # BLD AUTO: 5.51 X10E6/UL (ref 3.77–5.28)
SODIUM SERPL-SCNC: 140 MMOL/L (ref 134–144)
TRIGL SERPL-MCNC: 87 MG/DL (ref 0–149)
VLDLC SERPL CALC-MCNC: 16 MG/DL (ref 5–40)
WBC # BLD AUTO: 4.9 X10E3/UL (ref 3.4–10.8)

## 2022-11-21 ENCOUNTER — OFFICE VISIT (OUTPATIENT)
Dept: FAMILY MEDICINE | Facility: CLINIC | Age: 72
End: 2022-11-21
Payer: COMMERCIAL

## 2022-11-21 ENCOUNTER — TELEPHONE (OUTPATIENT)
Dept: FAMILY MEDICINE | Facility: CLINIC | Age: 72
End: 2022-11-21

## 2022-11-21 VITALS
HEIGHT: 62 IN | DIASTOLIC BLOOD PRESSURE: 68 MMHG | BODY MASS INDEX: 36.77 KG/M2 | TEMPERATURE: 97.2 F | WEIGHT: 199.8 LBS | OXYGEN SATURATION: 97 % | HEART RATE: 88 BPM | SYSTOLIC BLOOD PRESSURE: 116 MMHG

## 2022-11-21 DIAGNOSIS — E11.65 TYPE 2 DIABETES MELLITUS WITH HYPERGLYCEMIA, WITHOUT LONG-TERM CURRENT USE OF INSULIN (CMS/HCC): ICD-10-CM

## 2022-11-21 DIAGNOSIS — I10 BENIGN ESSENTIAL HYPERTENSION: Primary | ICD-10-CM

## 2022-11-21 DIAGNOSIS — E78.2 MULTIPLE-TYPE HYPERLIPIDEMIA: ICD-10-CM

## 2022-11-21 DIAGNOSIS — E66.01 CLASS 2 SEVERE OBESITY DUE TO EXCESS CALORIES WITH SERIOUS COMORBIDITY AND BODY MASS INDEX (BMI) OF 36.0 TO 36.9 IN ADULT (CMS/HCC): ICD-10-CM

## 2022-11-21 DIAGNOSIS — E66.812 CLASS 2 SEVERE OBESITY DUE TO EXCESS CALORIES WITH SERIOUS COMORBIDITY AND BODY MASS INDEX (BMI) OF 36.0 TO 36.9 IN ADULT (CMS/HCC): ICD-10-CM

## 2022-11-21 LAB — MLHC DIABETIC EYE EXAM (EXTERNAL): NORMAL

## 2022-11-21 PROCEDURE — G0439 PPPS, SUBSEQ VISIT: HCPCS | Performed by: FAMILY MEDICINE

## 2022-11-21 ASSESSMENT — MINI COG
COMPLETED: YES
TOTAL SCORE: 5

## 2022-11-21 ASSESSMENT — PATIENT HEALTH QUESTIONNAIRE - PHQ9: SUM OF ALL RESPONSES TO PHQ9 QUESTIONS 1 & 2: 0

## 2022-11-21 NOTE — ASSESSMENT & PLAN NOTE
Lab Results   Component Value Date    HGBA1C 6.7 (H) 11/16/2022     Controlled  Cont statin and acei   Follows with Dr Hernandez compliant  Cont current regimen

## 2022-11-21 NOTE — PATIENT INSTRUCTIONS
Your Personalized Prevention Plan Services (PPPS)    Preventive Services Checklist (Assumes Average Risk Unless Otherwise Noted):    Health Maintenance Topics with due status: Overdue       Topic Date Due    Depression Screening Never done    Diabetic Foot Exam 03/27/2019    Zoster Vaccine 12/04/2019    Medicare Annual Wellness Visit 11/15/2022     Health Maintenance Topics with due status: Not Due       Topic Last Completion Date    Colorectal Cancer Screening 07/22/2020    DTaP, Tdap, and Td Vaccines 10/25/2020    Breast Cancer Screening 12/01/2021    Annual Dilated Retinal Exam 12/06/2021    DEXA Scan 12/08/2021    COVID-19 Vaccine 10/21/2022    Diabetes Kidney Health Evaluation: eGFR 11/16/2022    Diabetes Kidney Health Evaluation:  uACR 11/16/2022    Hemoglobin A1C 11/16/2022     Health Maintenance Topics with due status: Completed       Topic Last Completion Date    Pneumococcal (65 years and older) 09/19/2017    Hepatitis C Screening 09/23/2019    Influenza Vaccine 09/26/2022    Annual Falls Risk Screening 11/21/2022     Health Maintenance Topics with due status: Aged Out       Topic Date Due    Meningococcal ACWY Aged Out    HIB Vaccines Aged Out    IPV Vaccines Aged Out    HPV Vaccines Aged Out       You May Be Eligible for These Additional Preventive Services   (Assumes Average Risk Unless Otherwise Noted)  Diabetes Screening Any 1 risk factor: hypertension, dyslipidemia, obesity, high glucose; or Any 2 risk factors: >=66yo, overweight, family history diabetes (covered every 6 months)   Hepatitis C Screening Any 1 risk factor: 1) blood transfusion before 1992,   2) current or past injection drug use (annually for high risk; if born between 6222-3018, see above for status).   Vaccine: Hepatitis B As necessary if at-risk: hemophilia, ESRD, diabetes, living with individual infected with hep B, healthcare worker with frequent contact with blood/bodily fluids (series covered once)    Sexually Transmitted Diseases (STDs) As necessary chlamydia, gonorrhea, syphilis, hepatitis B (covered annually)  HIV if any 1 risk factor present: 1) <14yo or >66yo and at increased risk or 2) 15-66yo and ask for it (covered annually)   Lung Cancer Screening Low dose chest CT if all three risk factors: 1) 50-76yo, 2) smoker or quit within last 15y, 3) >=20 pack years (covered annually).  No results found for this or any previous visit.       Cholesterol Screening Both risk factors: 1) >=19yo and 2)  increased risk coronary artery disease (covered every 5 years).     Breast Cancer Screening Covered once 35-38yo, annually >=39yo (if >=51yo, see above for status).     Glaucoma Screening Any 1 risk factor: 1) diabetes, 2) family history glaucoma, 3)  >=51yo, 4)  American >=66yo (covered annually)           Health Risk Factors with Personalized Education:  ----------------------------------------------------------------------------------------------------------------------  Controlling Your Blood Pressure  · Maintain a normal weight (body mass index between 18.5 and 24.9).  · Eat more fruit, vegetables and low-fat dairy.  · Eat less saturated fat and total fat.  · Lower your sodium (salt) intake.  Try to stay under 1500 mg per day, but if you cannot get your intake to be that low, at least lower it by 1000 mg.  · Stay active.  Try to get at least 90 to 150 minutes of exercise per week.  Try brisk walking, swimming, bicycling or dancing.  · Limit alcohol intake.  When you do consume alcohol, drink no more than 1 drink per day.  · If you have been prescribed medication, take it regularly and exactly as prescribed.  Let your PCP know if you have any problems or questions about your medication.  · Check your blood pressure at home or at the store.  Write down your readings and share them with your  PCP  ----------------------------------------------------------------------------------------------------------------------  Controlling Your Diabetes  · Stress can raise your blood sugar.  Learn what causes your stress.  Learn to lower your stress by spending time with family and friends, exercising, deep breathing, trying meditation or yoga, enjoying hobbies and getting enough rest.  Let your PCP know if youre having problems limiting your stress.  · Maintain a healthy weight by balancing your diet and exercise.  · Choose foods that are lower in calories, saturated fat, trans fat, sugar and salt.  Eat foods with more fiber, like whole grain cereals, bread, crackers, rice or pasta.  Choose to eat fruit, vegetables, and low-fat or fat-free/skim dairy.  · Reduce the portion size of your meals.  Make half of your meal vegetables and fruit, and divide the other half between lean protein and whole grains.  · Drink water instead of juice and regular soda.  · Spend at least some time being active on most days of the week.  · Work to increase your muscle strength at least twice per week.  Try things like light weights, stretch bands, yoga, heavy gardening (digging, planting with tools) or push-ups.  · If you have been prescribed medication, take it regularly and exactly as prescribed.  Let your PCP know if you have any problems or questions about your medication.  · If you have been asked to check your blood sugar, write down your readings and share them with your PCP  ----------------------------------------------------------------------------------------------------------------------  Controlling Your Cholesterol  · Reduce the amount of saturated and trans fat in your diet.  Limit intake of red meat.  Consume only low-fat or non-fat/skim dairy.  Limit fried food.  Cook with vegetable oils.  · Reduce your intake of sugary foods, sugary drinks and alcohol.  · Eat a diet high in fruit, vegetables and whole grains.  · Get  protein from fish, poultry and a small portion of nuts.  · Stay active.  Try to get at least 90 to 150 minutes of exercise per week.  Try brisk walking, swimming, bicycling or dancing.  · Maintain a healthy weight by balancing your diet and exercise.  · If you have been prescribed medication, take it regularly and exactly as prescribed. Let your PCP know if you have any problems or questions about your medication.  · Its important to know your cholesterol numbers.  When recommended by your PCP, get the cholesterol blood test.  ----------------------------------------------------------------------------------------------------------------------  Maintaining Strong Bones  · Try to get at least 90 to 150 minutes of weight-bearing exercise per week.  · Ensure intake of at least 1200mg of calcium per day.  Eat foods high in calcium like milk and other dairy, green vegetables, fruit, canned fish with soft and edible bones, nuts, calcium-set tofu.  Some foods are calcium-fortified, like bread, cereal, fruit juices and mineral water.  · Help your body make vitamin D by getting 10-15 minutes per day of sunlight.    · Ensure intake of at least 600IU of vitamin D per day.  Eat foods high in vitamin D like oily fish (salmon, sardines, mackerel) and eggs.  Some foods are fortified with vitamin D, like dairy and cereals.  · Avoid high amounts of caffeine and salt, since they can cause the body to loose calcium.  · Limit alcohol intake, since it is associated with weaker bones and is associated with falls and fractures.  · Limit intake of fizzy drinks.  ----------------------------------------------------------------------------------------------------------------------  Reducing Your Risk of Falls  · Tell your PCP if any of your medications make you feel tired, dizzy, lightheaded or off-balance.  · Maintain coordination, flexibility and balance by ensuring regular physical activity.  · Limit alcohol intake to 1 drink per day.   Consider avoiding all alcohol intake.  · Ensure good vision.  Visit an ophthalmologist or optometrist regularly for vision screening or to make sure your glasses / contact lens prescription is correct.  If you need glasses or contacts, wear them.  When you get new glasses or contacts, take time to get used to them.  Do not wear sunglasses or tinted lenses when indoors.  · Ensure good hearing.  Have your hearing checked if you are having trouble hearing, or family and friends think you cannot hear them.  If you need a hearing aid, be sure it fits well and wear it.  · Get enough rest.  Ensure about 7-9 hours of sleep every day.  · Get up slowly from your bed or chairs.  Do not start walking until you are sure you feel steady.  · Wear non-skid, rubber-soled, low-heeled shoes.  Do not walk in socks, or in shoes and slippers with smooth soles.  · If your PCP or therapist recommends using a cane or walker, use it regularly.  · Make your home safer.  Increase lighting throughout the house, especially at the top and bottom of stairs.  Ensure lighting is easily turned on when getting up in the middle of the night.  Make sure there are two secure rails on all stairs.  Install grab bars in the bathtub / shower and near the toilet.  Consider using a shower chair and / or a hand-held shower.  · Spread sand or salt on icy surfaces.  Beware of wet surfaces, which can be icy.  · Tell your PCP if you have fallen.

## 2022-11-21 NOTE — PROGRESS NOTES
Subjective     Fanny Reddy is a 72 y.o. female who presents for an initial annual wellness visit.     Patient Care Team:  Lupe Mendez DO as PCP - General (Family Medicine)  Lupe Mendez DO    Comprehensive Medical and Social History  Patient Active Problem List   Diagnosis    Benign essential hypertension    Type 2 diabetes mellitus with hyperglycemia (CMS/HCC)    Multiple-type hyperlipidemia    Vitamin D deficiency    Class 2 obesity due to excess calories with body mass index (BMI) of 36.0 to 36.9 in adult    Routine general medical examination at a health care facility    Localized swelling, mass or lump of neck    Other irritable bowel syndrome     Past Medical History:   Diagnosis Date    Cutaneous abscess of neck 2/7/2019    Hypertension     Lipid disorder     Type 2 diabetes mellitus (CMS/HCC)      Past Surgical History:   Procedure Laterality Date    HYSTERECTOMY       No Known Allergies  Current Outpatient Medications   Medication Sig Dispense Refill    aspirin (ASPIR-LOW) 81 mg enteric coated tablet Take 1 tablet by mouth daily.      blood sugar diagnostic (CONTOUR NEXT TEST STRIPS) strip Test twice daily 200 strip 3    dicyclomine 20 mg tablet       empagliflozin (JARDIANCE) 10 mg tablet Take 1 tablet (10 mg total) by mouth daily. 90 tablet 1    losartan-hydrochlorothiazide (HYZAAR) 50-12.5 mg per tablet TAKE 1 TABLET BY MOUTH EVERY DAY 90 tablet 1    metFORMIN (GLUCOPHAGE) 1,000 mg tablet TAKE 1 TABLET BY MOUTH TWICE A DAY WITH MEALS 180 tablet 1    omeprazole (PriLOSEC) 40 mg capsule TAKE 1 CAPSULE BY MOUTH EVERY DAY 90 capsule 1    rosuvastatin (CRESTOR) 20 mg tablet TAKE 1 TABLET BY MOUTH EVERY DAY 90 tablet 1    traZODone (DESYREL) 50 mg tablet TAKE 1 SPLIT TABLET (25 MG TOTAL) BY MOUTH NIGHTLY. 90 tablet 1    valACYclovir (VALTREX) 500 mg tablet TAKE 1 TABLET BY MOUTH EVERY DAY 90 tablet 1     No current facility-administered  "medications for this visit.     Social History     Tobacco Use    Smoking status: Never    Smokeless tobacco: Never   Substance Use Topics    Alcohol use: Yes     Comment: socially at celebratory events    Drug use: No     No family history on file.    Objective   Vitals  Vitals:    11/21/22 0910   BP: 116/68   Pulse: 88   Temp: 36.2 °C (97.2 °F)   SpO2: 97%   Weight: 90.6 kg (199 lb 12.8 oz)   Height: 1.575 m (5' 2\")     Body mass index is 36.54 kg/m².    Advanced Care Plan  Does patient have advance directive?: No                                     PHQ  Will the patient answer the depression questions?: Yes   Little interest or pleasure in doing things: Not at all   Feeling down, depressed, or hopeless: Not at all   Depression Risk: 0                                               Mini Cog  Completed: Yes  Score: 5  Result: Negative    Get Up and Go  Result: Pass    STEADI Falls Risk  One or more falls in the last year: No           Has trouble stepping up onto a curb: No   Advised to use a cane or walker to get around safely: No   Often has to rush to the toilet: No   Feels unsteady when walking: No   Has lost some feeling in feet: No   Often feels sad or depressed: No   Steadies self on furniture while walking at home: No   Takes medication that makes him/her feel lightheaded or more tired than usual: No   Worried about falling: No   Takes medicine to sleep or improve mood: Yes   Needs to push with hands when rising from a chair: No   Falls screen completed: Yes     Hearing and Vision Screening  No results found.  See HRA for relevant hearing screening response.    Assessment/Plan   Diagnoses and all orders for this visit:    Benign essential hypertension (Primary)  Assessment & Plan:  Stable, cont current regimen  Counseled low salt/sodium diet and increased cardiovascular exercise      Orders:  -     CBC and Differential; Future  -     Comprehensive metabolic panel; Future  -     Hemoglobin A1c; Future  -  "    Lipid panel; Future  -     Microalbumin/Creatinine Ur Random; Future    Class 2 severe obesity due to excess calories with serious comorbidity and body mass index (BMI) of 36.0 to 36.9 in adult (CMS/[facility])  Assessment & Plan:  Encouraged DASH/ADA diet with caloric appropriate portioning and limiting salt intake to < 2000mg/day. Encouraged regular aerobic exercise for at least 120-150 mins/week for cardioprotective benefits. Discussed goal BMI < 30 for morbidity/mortality benefits.        Orders:  -     CBC and Differential; Future  -     Comprehensive metabolic panel; Future  -     Hemoglobin A1c; Future  -     Lipid panel; Future  -     Microalbumin/Creatinine Ur Random; Future    Type 2 diabetes mellitus with hyperglycemia, without long-term current use of insulin (CMS/[facility])  Assessment & Plan:  Lab Results   Component Value Date    HGBA1C 6.7 (H) 11/16/2022     Controlled  Cont statin and acei   Follows with Dr Hernandez compliant  Cont current regimen    Orders:  -     CBC and Differential; Future  -     Comprehensive metabolic panel; Future  -     Hemoglobin A1c; Future  -     Lipid panel; Future  -     Microalbumin/Creatinine Ur Random; Future    Multiple-type hyperlipidemia  Assessment & Plan:  Cont statin  Lab Results   Component Value Date    CHOL 167 11/16/2022    CHOL 148 07/26/2022    CHOL 138 11/10/2021     Lab Results   Component Value Date    HDL 47 11/16/2022    HDL 49 07/26/2022    HDL 51 11/10/2021     Lab Results   Component Value Date    LDLCALC 104 (H) 11/16/2022    LDLCALC 82 07/26/2022    LDLCALC 71 11/10/2021     Lab Results   Component Value Date    TRIG 87 11/16/2022    TRIG 91 07/26/2022    TRIG 83 11/10/2021     No results found for: CHOLHDL      Orders:  -     CBC and Differential; Future  -     Comprehensive metabolic panel; Future  -     Hemoglobin A1c; Future  -     Lipid panel; Future  -     Microalbumin/Creatinine Ur Random; Future      See Patient Instructions (the written plan) which  was given to the patient for PPPS and health risk factors with interventions.

## 2022-11-21 NOTE — ASSESSMENT & PLAN NOTE
Cont statin  Lab Results   Component Value Date    CHOL 167 11/16/2022    CHOL 148 07/26/2022    CHOL 138 11/10/2021     Lab Results   Component Value Date    HDL 47 11/16/2022    HDL 49 07/26/2022    HDL 51 11/10/2021     Lab Results   Component Value Date    LDLCALC 104 (H) 11/16/2022    LDLCALC 82 07/26/2022    LDLCALC 71 11/10/2021     Lab Results   Component Value Date    TRIG 87 11/16/2022    TRIG 91 07/26/2022    TRIG 83 11/10/2021     No results found for: CHOLHDL

## 2022-11-28 ENCOUNTER — OFFICE VISIT (OUTPATIENT)
Dept: FAMILY MEDICINE | Facility: CLINIC | Age: 72
End: 2022-11-28
Payer: COMMERCIAL

## 2022-11-28 VITALS
BODY MASS INDEX: 36.22 KG/M2 | HEIGHT: 62 IN | WEIGHT: 196.8 LBS | TEMPERATURE: 97.5 F | OXYGEN SATURATION: 98 % | HEART RATE: 108 BPM

## 2022-11-28 DIAGNOSIS — J02.9 SORE THROAT: ICD-10-CM

## 2022-11-28 DIAGNOSIS — J02.9 VIRAL PHARYNGITIS: Primary | ICD-10-CM

## 2022-11-28 LAB
EXPIRATION DATE: NORMAL
Lab: NORMAL
POCT MANUFACTURER: NORMAL
RAPID INFLUENZA A AGN: NEGATIVE
RAPID INFLUENZA B AGN: NEGATIV E

## 2022-11-28 PROCEDURE — 3008F BODY MASS INDEX DOCD: CPT | Performed by: FAMILY MEDICINE

## 2022-11-28 PROCEDURE — 87804 INFLUENZA ASSAY W/OPTIC: CPT | Performed by: FAMILY MEDICINE

## 2022-11-28 PROCEDURE — 99214 OFFICE O/P EST MOD 30 MIN: CPT | Performed by: FAMILY MEDICINE

## 2022-11-28 RX ORDER — FLUTICASONE PROPIONATE 50 MCG
1 SPRAY, SUSPENSION (ML) NASAL DAILY
Qty: 16 G | Refills: 5 | Status: SHIPPED | OUTPATIENT
Start: 2022-11-28 | End: 2023-01-17 | Stop reason: SDUPTHER

## 2022-11-28 NOTE — PROGRESS NOTES
"Subjective      Patient ID: Fanny Reddy is a 72 y.o. female.    HPI     6 days of fatigue and sore throat  No fever/chills  Just fatigue  +postnasal  +sore throat  Cough occasional  Home covid test negative  Today starting to feel better    The following have been reviewed and updated as appropriate in this visit:   Allergies  Meds  Problems       Review of Systems   Constitutional: Positive for fatigue. Negative for chills and fever.   HENT: Positive for congestion, postnasal drip, rhinorrhea and sore throat. Negative for ear pain, sinus pressure and sinus pain.    Respiratory: Positive for cough. Negative for shortness of breath.    Cardiovascular: Negative for chest pain.       Social History     Socioeconomic History   • Marital status: Single     Spouse name: Not on file   • Number of children: Not on file   • Years of education: Not on file   • Highest education level: Not on file   Occupational History   • Not on file   Tobacco Use   • Smoking status: Never   • Smokeless tobacco: Never   Substance and Sexual Activity   • Alcohol use: Yes     Comment: socially at celebratory events   • Drug use: No   • Sexual activity: Never   Other Topics Concern   • Not on file   Social History Narrative   • Not on file     Social Determinants of Health     Financial Resource Strain: Not on file   Food Insecurity: Not on file   Transportation Needs: Not on file   Physical Activity: Not on file   Stress: Not on file   Social Connections: Not on file   Intimate Partner Violence: Not on file   Housing Stability: Not on file       Objective     Vitals:    11/28/22 1406   Pulse: (!) 108   Temp: 36.4 °C (97.5 °F)   SpO2: 98%   Weight: 89.3 kg (196 lb 12.8 oz)   Height: 1.562 m (5' 1.5\")     Body mass index is 36.58 kg/m².    Physical Exam  Vitals reviewed.   Constitutional:       Appearance: Normal appearance. She is well-developed and well-nourished.   HENT:      Head: Normocephalic and atraumatic.      Right Ear: " Tympanic membrane, ear canal and external ear normal. There is no impacted cerumen.      Left Ear: Tympanic membrane, ear canal and external ear normal. There is no impacted cerumen.      Nose: Rhinorrhea present. No sinus tenderness or congestion.      Right Sinus: No maxillary sinus tenderness or frontal sinus tenderness.      Left Sinus: No maxillary sinus tenderness or frontal sinus tenderness.      Mouth/Throat:      Pharynx: No oropharyngeal exudate or posterior oropharyngeal erythema.   Cardiovascular:      Rate and Rhythm: Normal rate and regular rhythm.      Pulses: Normal pulses.      Heart sounds: Normal heart sounds. No murmur heard.    No gallop.   Pulmonary:      Effort: Pulmonary effort is normal. No respiratory distress.      Breath sounds: Normal breath sounds. No wheezing.   Lymphadenopathy:      Cervical: No cervical adenopathy.   Neurological:      General: No focal deficit present.      Mental Status: She is alert and oriented to person, place, and time. Mental status is at baseline.   Psychiatric:         Mood and Affect: Mood normal.         Behavior: Behavior normal.         Thought Content: Thought content normal.         Judgment: Judgment normal.         Assessment/Plan   Problem List Items Addressed This Visit    None  Visit Diagnoses     Viral pharyngitis    -  Primary    cont supportive care. increase fluids and rest. rx fluticasone      Sore throat        Relevant Orders    POCT Influenza A/B (Completed)

## 2022-11-29 RX ORDER — TRAZODONE HYDROCHLORIDE 50 MG/1
TABLET ORAL
Qty: 90 TABLET | Refills: 1 | Status: SHIPPED | OUTPATIENT
Start: 2022-11-29 | End: 2023-04-03

## 2022-12-02 ASSESSMENT — ENCOUNTER SYMPTOMS
SINUS PAIN: 0
FEVER: 0
COUGH: 1
SHORTNESS OF BREATH: 0
RHINORRHEA: 1
CHILLS: 0
SINUS PRESSURE: 0
FATIGUE: 1
SORE THROAT: 1

## 2022-12-17 RX ORDER — METFORMIN HYDROCHLORIDE 1000 MG/1
1000 TABLET ORAL 2 TIMES DAILY WITH MEALS
Qty: 180 TABLET | Refills: 1 | Status: SHIPPED | OUTPATIENT
Start: 2022-12-17 | End: 2023-09-06

## 2022-12-17 RX ORDER — METFORMIN HYDROCHLORIDE 1000 MG/1
1000 TABLET ORAL 2 TIMES DAILY WITH MEALS
Qty: 180 TABLET | Refills: 1 | Status: SHIPPED | OUTPATIENT
Start: 2022-12-17 | End: 2023-01-11

## 2022-12-21 RX ORDER — VALACYCLOVIR HYDROCHLORIDE 500 MG/1
TABLET, FILM COATED ORAL
Qty: 90 TABLET | Refills: 1 | Status: SHIPPED | OUTPATIENT
Start: 2022-12-21 | End: 2023-07-29

## 2023-01-11 ENCOUNTER — OFFICE VISIT (OUTPATIENT)
Dept: FAMILY MEDICINE | Facility: CLINIC | Age: 73
End: 2023-01-11
Payer: COMMERCIAL

## 2023-01-11 VITALS
HEIGHT: 62 IN | HEART RATE: 97 BPM | SYSTOLIC BLOOD PRESSURE: 112 MMHG | BODY MASS INDEX: 36.73 KG/M2 | WEIGHT: 199.6 LBS | DIASTOLIC BLOOD PRESSURE: 60 MMHG | TEMPERATURE: 97 F | OXYGEN SATURATION: 98 %

## 2023-01-11 DIAGNOSIS — E66.812 CLASS 2 SEVERE OBESITY DUE TO EXCESS CALORIES WITH SERIOUS COMORBIDITY AND BODY MASS INDEX (BMI) OF 36.0 TO 36.9 IN ADULT (CMS/HCC): ICD-10-CM

## 2023-01-11 DIAGNOSIS — E66.01 CLASS 2 SEVERE OBESITY DUE TO EXCESS CALORIES WITH SERIOUS COMORBIDITY AND BODY MASS INDEX (BMI) OF 36.0 TO 36.9 IN ADULT (CMS/HCC): ICD-10-CM

## 2023-01-11 DIAGNOSIS — E11.65 TYPE 2 DIABETES MELLITUS WITH HYPERGLYCEMIA, WITHOUT LONG-TERM CURRENT USE OF INSULIN (CMS/HCC): ICD-10-CM

## 2023-01-11 DIAGNOSIS — R05.8 POST-VIRAL COUGH SYNDROME: Primary | ICD-10-CM

## 2023-01-11 DIAGNOSIS — I10 BENIGN ESSENTIAL HYPERTENSION: ICD-10-CM

## 2023-01-11 PROCEDURE — 99213 OFFICE O/P EST LOW 20 MIN: CPT | Mod: GE | Performed by: STUDENT IN AN ORGANIZED HEALTH CARE EDUCATION/TRAINING PROGRAM

## 2023-01-11 PROCEDURE — 3008F BODY MASS INDEX DOCD: CPT | Performed by: STUDENT IN AN ORGANIZED HEALTH CARE EDUCATION/TRAINING PROGRAM

## 2023-01-11 RX ORDER — BENZONATATE 100 MG/1
100 CAPSULE ORAL 3 TIMES DAILY PRN
Qty: 30 CAPSULE | Refills: 0 | Status: SHIPPED | OUTPATIENT
Start: 2023-01-11 | End: 2023-01-21

## 2023-01-11 ASSESSMENT — ENCOUNTER SYMPTOMS
MYALGIAS: 0
SORE THROAT: 1
CHILLS: 0
LIGHT-HEADEDNESS: 0
POLYDIPSIA: 0
UNEXPECTED WEIGHT CHANGE: 0
WHEEZING: 0
WEAKNESS: 0
ABDOMINAL PAIN: 0
HEMATURIA: 0
EYES NEGATIVE: 1
COUGH: 1
CHEST TIGHTNESS: 0
POLYPHAGIA: 0
NAUSEA: 0
PALPITATIONS: 0
FEVER: 0
HEADACHES: 0
FATIGUE: 0
SHORTNESS OF BREATH: 0
BLOOD IN STOOL: 0
DYSURIA: 0
CONSTIPATION: 0
ARTHRALGIAS: 0

## 2023-01-11 NOTE — ASSESSMENT & PLAN NOTE
Post viral cough, low suspicion for flu  Advised rest and hydration  Will provide rx for tessalon perles for symptomatic relief  Flonase for congestion relief and avoid post nasal drip  Gargle warm salt water, drink tea with honey.   Alternate Tylenol and Ibuprofen according to label instructions for relief of aches, pains, and fevers.   Return to office 10-14 days if not feeling any better.

## 2023-01-11 NOTE — PROGRESS NOTES
[facility]     Chief Complaint  Fanny Reddy is a 72 y.o. female who presents to the office for evaluation of persistent cough.    History of Present Illness  Reports that she returned from cruise (to [facility]) on December 11th, 2022. She developed URI symptoms that is occurring on and off since then. Has been taking over the counter medications - delsym, coricidan, dayquil with minimal relief.   No fevers or chills. No shortness of breath or pleuritic chest pain.  Having congestion. Non-productive cough throughout the day. Now feels a sensation at the left side of her neck.    Health Maintenance:  Flu and covid booster UTD      Past Medical History:  Patient Active Problem List    Diagnosis Date Noted   • Post-viral cough syndrome 01/11/2023   • Other irritable bowel syndrome 11/15/2021   • Localized swelling, mass or lump of neck 09/11/2020   • Routine general medical examination at a health care facility 07/10/2020   • Class 2 severe obesity due to excess calories with serious comorbidity and body mass index (BMI) of 36.0 to 36.9 in adult (CMS/[facility])    • Benign essential hypertension 01/19/2011   • Type 2 diabetes mellitus with hyperglycemia (CMS/[facility]) 01/19/2011   • Multiple-type hyperlipidemia 01/19/2011   • Vitamin D deficiency 01/19/2011       Past Surgical History:  Past Surgical History:   Procedure Laterality Date   • HYSTERECTOMY         Medications:    Current Outpatient Medications:   •  benzonatate (TESSALON PERLES) 100 mg capsule, Take 1 capsule (100 mg total) by mouth 3 (three) times a day as needed for cough for up to 10 days., Disp: 30 capsule, Rfl: 0  •  aspirin (ASPIR-LOW) 81 mg enteric coated tablet, Take 1 tablet by mouth daily., Disp: , Rfl:   •  blood sugar diagnostic (CONTOUR NEXT TEST STRIPS) strip, Test twice daily, Disp: 200 strip, Rfl: 3  •  dicyclomine 20 mg tablet, , Disp: , Rfl:   •  empagliflozin (JARDIANCE) 10 mg tablet, Take 1 tablet (10 mg total) by mouth  daily., Disp: 90 tablet, Rfl: 1  •  fluticasone propionate (FLONASE) 50 mcg/actuation nasal spray, Administer 1 spray into each nostril daily., Disp: 16 g, Rfl: 5  •  losartan-hydrochlorothiazide (HYZAAR) 50-12.5 mg per tablet, TAKE 1 TABLET BY MOUTH EVERY DAY, Disp: 90 tablet, Rfl: 1  •  metFORMIN (GLUCOPHAGE) 1,000 mg tablet, Take 1 tablet (1,000 mg total) by mouth 2 (two) times a day with meals., Disp: 180 tablet, Rfl: 1  •  omeprazole (PriLOSEC) 40 mg capsule, TAKE 1 CAPSULE BY MOUTH EVERY DAY, Disp: 90 capsule, Rfl: 1  •  rosuvastatin (CRESTOR) 20 mg tablet, TAKE 1 TABLET BY MOUTH EVERY DAY, Disp: 90 tablet, Rfl: 1  •  traZODone (DESYREL) 50 mg tablet, TAKE 1 SPLIT TABLET (25 MG TOTAL) BY MOUTH NIGHTLY., Disp: 90 tablet, Rfl: 1  •  valACYclovir (VALTREX) 500 mg tablet, TAKE 1 TABLET BY MOUTH EVERY DAY, Disp: 90 tablet, Rfl: 1    Allergies:  Patient has no known allergies.    Family History:  No family history on file.    Social History:  Social History     Socioeconomic History   • Marital status: Single     Spouse name: None   • Number of children: None   • Years of education: None   • Highest education level: None   Tobacco Use   • Smoking status: Never   • Smokeless tobacco: Never   Substance and Sexual Activity   • Alcohol use: Yes     Comment: socially at celebratory events   • Drug use: No   • Sexual activity: Never       Review of Systems:  Review of Systems   Constitutional: Negative for chills, fatigue, fever and unexpected weight change.   HENT: Positive for congestion, postnasal drip and sore throat.    Eyes: Negative.    Respiratory: Positive for cough. Negative for chest tightness, shortness of breath and wheezing.    Cardiovascular: Negative for chest pain, palpitations and leg swelling.   Gastrointestinal: Negative for abdominal pain, blood in stool, constipation and nausea.   Endocrine: Negative for polydipsia, polyphagia and polyuria.   Genitourinary: Negative for dysuria and hematuria.  "  Musculoskeletal: Negative for arthralgias, gait problem and myalgias.   Neurological: Negative for weakness, light-headedness and headaches.       Physical Exam:  Visit Vitals  /60   Pulse 97   Temp 36.1 °C (97 °F)   Ht 1.575 m (5' 2\")   Wt 90.5 kg (199 lb 9.6 oz)   SpO2 98%   BMI 36.51 kg/m²     Body mass index is 36.51 kg/m².    Physical Exam  Constitutional:       General: She is not in acute distress.     Appearance: Normal appearance.   HENT:      Head: Normocephalic and atraumatic.      Right Ear: Tympanic membrane normal.      Left Ear: Tympanic membrane normal.      Nose: Congestion present.      Mouth/Throat:      Mouth: Mucous membranes are moist.      Pharynx: Oropharyngeal exudate present.   Eyes:      General:         Right eye: No discharge.         Left eye: No discharge.      Extraocular Movements: Extraocular movements intact.      Pupils: Pupils are equal, round, and reactive to light.   Cardiovascular:      Rate and Rhythm: Normal rate and regular rhythm.      Pulses: Normal pulses.      Heart sounds: No murmur heard.  Pulmonary:      Effort: Pulmonary effort is normal. No respiratory distress.      Breath sounds: Normal breath sounds. No wheezing.   Abdominal:      General: There is no distension.      Palpations: Abdomen is soft.      Tenderness: There is no abdominal tenderness.   Musculoskeletal:      Cervical back: Normal range of motion.      Right lower leg: No edema.      Left lower leg: No edema.   Skin:     General: Skin is warm and dry.   Neurological:      General: No focal deficit present.      Mental Status: She is alert and oriented to person, place, and time.       Labs/Imaging Studies:  Lab Results   Component Value Date    WBC 4.9 11/16/2022    HGB 13.8 11/16/2022    HCT 43.2 11/16/2022    MCV 78 (L) 11/16/2022     11/16/2022       Lab Results   Component Value Date    GLUCOSE 121 (H) 11/16/2022    CALCIUM 8.7 (L) 02/18/2020     11/16/2022    K 4.6 11/16/2022 "    CO2 23 11/16/2022     11/16/2022    BUN 13 11/16/2022    CREATININE 0.95 11/16/2022     Lab Results   Component Value Date    CHOL 167 11/16/2022     Lab Results   Component Value Date    HDL 47 11/16/2022     Lab Results   Component Value Date    LDLCALC 104 (H) 11/16/2022     Lab Results   Component Value Date    TRIG 87 11/16/2022       No results found for: CHOLHDL      Assessment and Plan:  Problem List Items Addressed This Visit        Respiratory    Post-viral cough syndrome - Primary     Post viral cough, low suspicion for flu  Advised rest and hydration  Will provide rx for tessalon perles for symptomatic relief  Flonase for congestion relief and avoid post nasal drip  Gargle warm salt water, drink tea with honey.   Alternate Tylenol and Ibuprofen according to label instructions for relief of aches, pains, and fevers.   Return to office 10-14 days if not feeling any better.          Relevant Medications    benzonatate (TESSALON PERLES) 100 mg capsule       Circulatory    Benign essential hypertension     Hypertension is adequately controlled with present regimen.   No changes  Patient encouraged to incorporate DASH diet and limit sodium intake to less than 2300 mg/day.  Encouraged AHA recommendations of moderate-intensity exercise for at least 150 mins/week and at least 2 days of muscle-strengthening activity/week.             Endocrine/Metabolic    Type 2 diabetes mellitus with hyperglycemia (CMS/HCC)     Patient's T2DM is controlled    Lab Results   Component Value Date    HGBA1C 6.7 (H) 11/16/2022    HGBA1C 7.2 (H) 07/26/2022    HGBA1C 6.4 (H) 11/10/2021     Lab Results   Component Value Date    MICROALBUR 5.6 11/16/2022    MICROALBUR <3.0 07/26/2022    LDLCALC 104 (H) 11/16/2022    LDLCALC 82 07/26/2022    LDLCALC 71 11/10/2021    CREATININE 0.95 11/16/2022    CREATININE 0.93 07/26/2022    CREATININE 0.91 11/10/2021     No changes made this visit  Patient was encouraged to consume a caloric  appropriate diet of high quality low fat/cholesterol, low carb/low sugar foods.   Encouraged to incorporate regular aerobic and resistance exercise into lifestyle.  Instructed to continue self-monitoring of blood glucose. Morning fasting BG goal  mg/dL  Educated on recognition of signs and symptoms for hypoglycemia, and patient knows to drink juice or take glucose tablets.   Instructed to follow up with podiatrist for diabetic foot exam regularly  Instructed to follow up with ophthalmologist for yearly diabetic eye exam if haven't already.         Class 2 severe obesity due to excess calories with serious comorbidity and body mass index (BMI) of 36.0 to 36.9 in adult (CMS/[facility])     Patient encouraged to reduce weight by consuming a calorie appropriate diet of high quality food low in fat and carbs.    Engage in regular aerobic and resistance exercise as tolerated to enhance the weight reduction.    Maintain a food and exercise log for future reference.    Encouraged to start a log for weight loss monitoring: Log weight weekly, on the same day each week. Realistic weight loss should be 0.5-1 pound per week.              Return to office as scheduled      Aaliyah Abreu MD  01/11/23  5:33 PM

## 2023-01-11 NOTE — ASSESSMENT & PLAN NOTE
Patient's T2DM is controlled    Lab Results   Component Value Date    HGBA1C 6.7 (H) 11/16/2022    HGBA1C 7.2 (H) 07/26/2022    HGBA1C 6.4 (H) 11/10/2021     Lab Results   Component Value Date    MICROALBUR 5.6 11/16/2022    MICROALBUR <3.0 07/26/2022    LDLCALC 104 (H) 11/16/2022    LDLCALC 82 07/26/2022    LDLCALC 71 11/10/2021    CREATININE 0.95 11/16/2022    CREATININE 0.93 07/26/2022    CREATININE 0.91 11/10/2021     No changes made this visit  Patient was encouraged to consume a caloric appropriate diet of high quality low fat/cholesterol, low carb/low sugar foods.   Encouraged to incorporate regular aerobic and resistance exercise into lifestyle.  Instructed to continue self-monitoring of blood glucose. Morning fasting BG goal  mg/dL  Educated on recognition of signs and symptoms for hypoglycemia, and patient knows to drink juice or take glucose tablets.   Instructed to follow up with podiatrist for diabetic foot exam regularly  Instructed to follow up with ophthalmologist for yearly diabetic eye exam if haven't already.

## 2023-01-11 NOTE — PATIENT INSTRUCTIONS
Continue to use flonase 2 pumps in each nostril at night, use it until March  Salt water gargle 3 times a day  Use the tessalon perles as needed up to 3 times a day for 10 days    We will see you in May 2023

## 2023-01-11 NOTE — ASSESSMENT & PLAN NOTE
Patient encouraged to reduce weight by consuming a calorie appropriate diet of high quality food low in fat and carbs.    Engage in regular aerobic and resistance exercise as tolerated to enhance the weight reduction.    Maintain a food and exercise log for future reference.    Encouraged to start a log for weight loss monitoring: Log weight weekly, on the same day each week. Realistic weight loss should be 0.5-1 pound per week.

## 2023-01-11 NOTE — ASSESSMENT & PLAN NOTE
Hypertension is adequately controlled with present regimen.   No changes  Patient encouraged to incorporate DASH diet and limit sodium intake to less than 2300 mg/day.  Encouraged AHA recommendations of moderate-intensity exercise for at least 150 mins/week and at least 2 days of muscle-strengthening activity/week.

## 2023-01-17 RX ORDER — FLUTICASONE PROPIONATE 50 MCG
1 SPRAY, SUSPENSION (ML) NASAL DAILY
Qty: 16 G | Refills: 5 | Status: SHIPPED | OUTPATIENT
Start: 2023-01-17 | End: 2023-02-16 | Stop reason: SDUPTHER

## 2023-01-30 RX ORDER — LOSARTAN POTASSIUM AND HYDROCHLOROTHIAZIDE 12.5; 5 MG/1; MG/1
1 TABLET ORAL DAILY
Qty: 90 TABLET | Refills: 1 | Status: SHIPPED | OUTPATIENT
Start: 2023-01-30 | End: 2023-07-29

## 2023-02-07 ENCOUNTER — HOSPITAL ENCOUNTER (OUTPATIENT)
Dept: RADIOLOGY | Facility: HOSPITAL | Age: 73
Discharge: HOME | End: 2023-02-07
Attending: PHYSICIAN ASSISTANT
Payer: COMMERCIAL

## 2023-02-07 ENCOUNTER — TRANSCRIBE ORDERS (OUTPATIENT)
Dept: RADIOLOGY | Facility: HOSPITAL | Age: 73
End: 2023-02-07

## 2023-02-07 DIAGNOSIS — Z12.31 ENCOUNTER FOR SCREENING MAMMOGRAM FOR MALIGNANT NEOPLASM OF BREAST: ICD-10-CM

## 2023-02-07 DIAGNOSIS — Z12.31 ENCOUNTER FOR SCREENING MAMMOGRAM FOR MALIGNANT NEOPLASM OF BREAST: Primary | ICD-10-CM

## 2023-02-07 PROCEDURE — 77067 SCR MAMMO BI INCL CAD: CPT

## 2023-02-07 PROCEDURE — 77063 BREAST TOMOSYNTHESIS BI: CPT

## 2023-02-17 RX ORDER — FLUTICASONE PROPIONATE 50 MCG
1 SPRAY, SUSPENSION (ML) NASAL DAILY
Qty: 16 G | Refills: 5 | Status: SHIPPED | OUTPATIENT
Start: 2023-02-17 | End: 2023-06-01 | Stop reason: SDUPTHER

## 2023-04-03 ENCOUNTER — DOCUMENTATION (OUTPATIENT)
Dept: FAMILY MEDICINE | Facility: CLINIC | Age: 73
End: 2023-04-03
Payer: COMMERCIAL

## 2023-04-03 RX ORDER — TRAZODONE HYDROCHLORIDE 50 MG/1
TABLET ORAL
Qty: 90 TABLET | Refills: 1 | Status: SHIPPED | OUTPATIENT
Start: 2023-04-03

## 2023-04-03 RX ORDER — OMEPRAZOLE 40 MG/1
CAPSULE, DELAYED RELEASE ORAL
Qty: 90 CAPSULE | Refills: 1 | Status: SHIPPED | OUTPATIENT
Start: 2023-04-03 | End: 2023-10-27

## 2023-04-03 RX ORDER — ROSUVASTATIN CALCIUM 20 MG/1
TABLET, COATED ORAL
Qty: 90 TABLET | Refills: 1 | Status: SHIPPED | OUTPATIENT
Start: 2023-04-03 | End: 2023-08-21

## 2023-04-03 NOTE — PROGRESS NOTES
Svetlana Lindsey MA has completed a chart review for Fanny COTTO Killian Reddy and have determined that the care gap has been satisfied.    Care Gap Source:  ([employer] MEDICARE)    Care Gap(s) Identified: Diabetic Hemoglobin A1c    Chart Review Completed: Yes     COMPLETED 11/16/22 RESULT 6.7

## 2023-04-17 ENCOUNTER — TRANSCRIBE ORDERS (OUTPATIENT)
Dept: SCHEDULING | Age: 73
End: 2023-04-17

## 2023-04-17 DIAGNOSIS — K21.9 GASTRO-ESOPHAGEAL REFLUX DISEASE WITHOUT ESOPHAGITIS: Primary | ICD-10-CM

## 2023-05-02 ENCOUNTER — HOSPITAL ENCOUNTER (OUTPATIENT)
Dept: RADIOLOGY | Facility: HOSPITAL | Age: 73
Discharge: HOME | End: 2023-05-02
Attending: INTERNAL MEDICINE
Payer: COMMERCIAL

## 2023-05-02 DIAGNOSIS — K21.9 GASTRO-ESOPHAGEAL REFLUX DISEASE WITHOUT ESOPHAGITIS: ICD-10-CM

## 2023-05-02 PROCEDURE — 74220 X-RAY XM ESOPHAGUS 1CNTRST: CPT

## 2023-05-02 PROCEDURE — 25500000 HC DRUGS/INCIDENT RAD: Performed by: INTERNAL MEDICINE

## 2023-05-02 RX ADMIN — BARIUM SULFATE 135 ML: 980 POWDER, FOR SUSPENSION ORAL at 08:50

## 2023-05-02 RX ADMIN — BARIUM SULFATE 176 G: 960 POWDER, FOR SUSPENSION ORAL at 08:50

## 2023-05-19 LAB
ALBUMIN SERPL-MCNC: 4.4 G/DL (ref 3.7–4.7)
ALBUMIN/CREAT UR: 5 MG/G CREAT (ref 0–29)
ALBUMIN/GLOB SERPL: 2 {RATIO} (ref 1.2–2.2)
ALP SERPL-CCNC: 81 IU/L (ref 44–121)
ALT SERPL-CCNC: 19 IU/L (ref 0–32)
AST SERPL-CCNC: 22 IU/L (ref 0–40)
BASOPHILS # BLD AUTO: 0.1 X10E3/UL (ref 0–0.2)
BASOPHILS NFR BLD AUTO: 1 %
BILIRUB SERPL-MCNC: 0.3 MG/DL (ref 0–1.2)
BUN SERPL-MCNC: 13 MG/DL (ref 8–27)
BUN/CREAT SERPL: 14 (ref 12–28)
CALCIUM SERPL-MCNC: 9.8 MG/DL (ref 8.7–10.3)
CHLORIDE SERPL-SCNC: 102 MMOL/L (ref 96–106)
CHOLEST SERPL-MCNC: 163 MG/DL (ref 100–199)
CO2 SERPL-SCNC: 24 MMOL/L (ref 20–29)
CREAT SERPL-MCNC: 0.94 MG/DL (ref 0.57–1)
CREAT UR-MCNC: 144.1 MG/DL
EGFRCR SERPLBLD CKD-EPI 2021: 64 ML/MIN/1.73
EOSINOPHIL # BLD AUTO: 0.1 X10E3/UL (ref 0–0.4)
EOSINOPHIL NFR BLD AUTO: 2 %
ERYTHROCYTE [DISTWIDTH] IN BLOOD BY AUTOMATED COUNT: 14.3 % (ref 11.7–15.4)
GLOBULIN SER CALC-MCNC: 2.2 G/DL (ref 1.5–4.5)
GLUCOSE SERPL-MCNC: 122 MG/DL (ref 70–99)
HBA1C MFR BLD: 6.7 % (ref 4.8–5.6)
HCT VFR BLD AUTO: 43.5 % (ref 34–46.6)
HDLC SERPL-MCNC: 59 MG/DL
HGB BLD-MCNC: 13.6 G/DL (ref 11.1–15.9)
IMM GRANULOCYTES # BLD AUTO: 0 X10E3/UL (ref 0–0.1)
IMM GRANULOCYTES NFR BLD AUTO: 0 %
LDLC SERPL CALC-MCNC: 89 MG/DL (ref 0–99)
LYMPHOCYTES # BLD AUTO: 1.5 X10E3/UL (ref 0.7–3.1)
LYMPHOCYTES NFR BLD AUTO: 32 %
MCH RBC QN AUTO: 24.8 PG (ref 26.6–33)
MCHC RBC AUTO-ENTMCNC: 31.3 G/DL (ref 31.5–35.7)
MCV RBC AUTO: 79 FL (ref 79–97)
MICROALBUMIN UR-MCNC: 7.4 UG/ML
MONOCYTES # BLD AUTO: 0.4 X10E3/UL (ref 0.1–0.9)
MONOCYTES NFR BLD AUTO: 8 %
NEUTROPHILS # BLD AUTO: 2.7 X10E3/UL (ref 1.4–7)
NEUTROPHILS NFR BLD AUTO: 57 %
PLATELET # BLD AUTO: 264 X10E3/UL (ref 150–450)
POTASSIUM SERPL-SCNC: 4.1 MMOL/L (ref 3.5–5.2)
PROT SERPL-MCNC: 6.6 G/DL (ref 6–8.5)
RBC # BLD AUTO: 5.49 X10E6/UL (ref 3.77–5.28)
SODIUM SERPL-SCNC: 142 MMOL/L (ref 134–144)
TRIGL SERPL-MCNC: 77 MG/DL (ref 0–149)
VLDLC SERPL CALC-MCNC: 15 MG/DL (ref 5–40)
WBC # BLD AUTO: 4.8 X10E3/UL (ref 3.4–10.8)

## 2023-05-22 ENCOUNTER — OFFICE VISIT (OUTPATIENT)
Dept: FAMILY MEDICINE | Facility: CLINIC | Age: 73
End: 2023-05-22
Payer: COMMERCIAL

## 2023-05-22 VITALS
HEART RATE: 78 BPM | DIASTOLIC BLOOD PRESSURE: 70 MMHG | OXYGEN SATURATION: 97 % | SYSTOLIC BLOOD PRESSURE: 122 MMHG | HEIGHT: 62 IN | TEMPERATURE: 97 F | BODY MASS INDEX: 36.03 KG/M2 | WEIGHT: 195.8 LBS

## 2023-05-22 DIAGNOSIS — I10 BENIGN ESSENTIAL HYPERTENSION: ICD-10-CM

## 2023-05-22 DIAGNOSIS — E66.812 CLASS 2 SEVERE OBESITY DUE TO EXCESS CALORIES WITH SERIOUS COMORBIDITY AND BODY MASS INDEX (BMI) OF 36.0 TO 36.9 IN ADULT (CMS/HCC): Primary | ICD-10-CM

## 2023-05-22 DIAGNOSIS — E66.01 CLASS 2 SEVERE OBESITY DUE TO EXCESS CALORIES WITH SERIOUS COMORBIDITY AND BODY MASS INDEX (BMI) OF 36.0 TO 36.9 IN ADULT (CMS/HCC): Primary | ICD-10-CM

## 2023-05-22 DIAGNOSIS — E11.65 TYPE 2 DIABETES MELLITUS WITH HYPERGLYCEMIA, WITHOUT LONG-TERM CURRENT USE OF INSULIN (CMS/HCC): ICD-10-CM

## 2023-05-22 PROCEDURE — 3074F SYST BP LT 130 MM HG: CPT | Performed by: FAMILY MEDICINE

## 2023-05-22 PROCEDURE — 3008F BODY MASS INDEX DOCD: CPT | Performed by: FAMILY MEDICINE

## 2023-05-22 PROCEDURE — 99214 OFFICE O/P EST MOD 30 MIN: CPT | Performed by: FAMILY MEDICINE

## 2023-05-22 PROCEDURE — 3078F DIAST BP <80 MM HG: CPT | Performed by: FAMILY MEDICINE

## 2023-05-22 ASSESSMENT — ENCOUNTER SYMPTOMS
ARTHRALGIAS: 0
COUGH: 0
TROUBLE SWALLOWING: 0
EYE PAIN: 0
FEVER: 0
WHEEZING: 0
NUMBNESS: 0
CONSTIPATION: 0
DIARRHEA: 0
BACK PAIN: 0
FREQUENCY: 0
NERVOUS/ANXIOUS: 0
DIZZINESS: 0
SHORTNESS OF BREATH: 0
ACTIVITY CHANGE: 0
ABDOMINAL PAIN: 0
NAUSEA: 0
FATIGUE: 0
HEMATURIA: 0
VOMITING: 0
APPETITE CHANGE: 0
SORE THROAT: 0

## 2023-05-22 NOTE — ASSESSMENT & PLAN NOTE
Lab Results   Component Value Date    HGBA1C 6.7 (H) 05/18/2023     Cont current regimen  utd ophtho

## 2023-05-22 NOTE — PROGRESS NOTES
Subjective      Patient ID: Fanny Reddy is a 73 y.o. female.    HPI     Chronic conditions  dm2 - stable  htn - stable  hld - stable    No acute complaints    The following have been reviewed and updated as appropriate in this visit:   Allergies  Meds  Problems       Review of Systems   Constitutional: Negative for activity change, appetite change, fatigue and fever.   HENT: Negative for congestion, ear pain, postnasal drip, sore throat and trouble swallowing.    Eyes: Negative for pain.   Respiratory: Negative for cough, shortness of breath and wheezing.    Cardiovascular: Negative for chest pain.   Gastrointestinal: Negative for abdominal pain, constipation, diarrhea, nausea and vomiting.   Genitourinary: Negative for frequency and hematuria.   Musculoskeletal: Negative for arthralgias and back pain.   Skin: Negative for rash.   Neurological: Negative for dizziness and numbness.   Psychiatric/Behavioral: The patient is not nervous/anxious.        Social History     Socioeconomic History   • Marital status: Single     Spouse name: Not on file   • Number of children: Not on file   • Years of education: Not on file   • Highest education level: Not on file   Occupational History   • Not on file   Tobacco Use   • Smoking status: Never   • Smokeless tobacco: Never   Vaping Use   • Vaping status: Not on file   Substance and Sexual Activity   • Alcohol use: Yes     Comment: socially at celebratory events   • Drug use: No   • Sexual activity: Never   Other Topics Concern   • Not on file   Social History Narrative   • Not on file     Social Determinants of Health     Financial Resource Strain: Not on file   Food Insecurity: Not on file   Transportation Needs: Not on file   Physical Activity: Not on file   Stress: Not on file   Social Connections: Not on file   Intimate Partner Violence: Not on file   Housing Stability: Not on file       Objective     Vitals:    05/22/23 0909   BP: 122/70   Pulse: 78   Temp: 36.1  "°C (97 °F)   SpO2: 97%   Weight: 88.8 kg (195 lb 12.8 oz)   Height: 1.575 m (5' 2\")     Body mass index is 35.81 kg/m².    Physical Exam  Constitutional:       Appearance: Normal appearance. She is well-developed. She is obese.   HENT:      Head: Normocephalic and atraumatic.   Neck:      Thyroid: No thyromegaly.   Cardiovascular:      Rate and Rhythm: Normal rate and regular rhythm.      Heart sounds: Normal heart sounds. No murmur heard.     No gallop.   Pulmonary:      Effort: Pulmonary effort is normal. No respiratory distress.      Breath sounds: Normal breath sounds. No wheezing.   Musculoskeletal:      Right lower leg: No edema.      Left lower leg: No edema.   Skin:     General: Skin is warm and dry.      Findings: No erythema or rash.   Neurological:      General: No focal deficit present.      Mental Status: She is alert and oriented to person, place, and time. Mental status is at baseline.   Psychiatric:         Mood and Affect: Mood normal.         Behavior: Behavior normal.         Thought Content: Thought content normal.         Judgment: Judgment normal.         Assessment/Plan   Problem List Items Addressed This Visit        Circulatory    Benign essential hypertension     Stable, cont current regimen  Counseled low salt/sodium diet and increased cardiovascular exercise              Endocrine/Metabolic    Type 2 diabetes mellitus with hyperglycemia (CMS/HCC)     Lab Results   Component Value Date    HGBA1C 6.7 (H) 05/18/2023     Cont current regimen  utd ophtho         Class 2 severe obesity due to excess calories with serious comorbidity and body mass index (BMI) of 36.0 to 36.9 in adult (CMS/HCC) - Primary     Encouraged DASH/ADA diet with caloric appropriate portioning and limiting salt intake to < 2000mg/day. Encouraged regular aerobic exercise for at least 120-150 mins/week for cardioprotective benefits. Discussed goal BMI < 30 for morbidity/mortality benefits.              mammo utd  dexa - due " dec 2023  Colonoscopy - utd

## 2023-06-01 RX ORDER — FLUTICASONE PROPIONATE 50 MCG
1 SPRAY, SUSPENSION (ML) NASAL DAILY
Qty: 16 G | Refills: 5 | Status: SHIPPED | OUTPATIENT
Start: 2023-06-01 | End: 2024-11-22

## 2023-07-18 DIAGNOSIS — E11.65 TYPE 2 DIABETES MELLITUS WITH HYPERGLYCEMIA, WITHOUT LONG-TERM CURRENT USE OF INSULIN (CMS/HCC): Primary | ICD-10-CM

## 2023-09-06 RX ORDER — METFORMIN HYDROCHLORIDE 1000 MG/1
1000 TABLET ORAL 2 TIMES DAILY WITH MEALS
Qty: 180 TABLET | Refills: 1 | Status: SHIPPED | OUTPATIENT
Start: 2023-09-06 | End: 2024-03-09

## 2023-10-16 ENCOUNTER — OFFICE VISIT (OUTPATIENT)
Dept: FAMILY MEDICINE | Facility: CLINIC | Age: 73
End: 2023-10-16
Payer: COMMERCIAL

## 2023-10-16 DIAGNOSIS — Z23 NEED FOR VACCINATION: Primary | ICD-10-CM

## 2023-10-16 DIAGNOSIS — Z23 NEED FOR VACCINATION: ICD-10-CM

## 2023-10-16 PROCEDURE — G0008 ADMIN INFLUENZA VIRUS VAC: HCPCS | Performed by: FAMILY MEDICINE

## 2023-10-16 PROCEDURE — 99999 PR OFFICE/OUTPT VISIT,PROCEDURE ONLY: CPT | Mod: 25 | Performed by: FAMILY MEDICINE

## 2023-10-16 PROCEDURE — 90694 VACC AIIV4 NO PRSRV 0.5ML IM: CPT | Performed by: FAMILY MEDICINE

## 2023-10-27 RX ORDER — OMEPRAZOLE 40 MG/1
40 CAPSULE, DELAYED RELEASE ORAL DAILY
Qty: 90 CAPSULE | Refills: 1 | Status: SHIPPED | OUTPATIENT
Start: 2023-10-27 | End: 2024-07-28

## 2023-10-27 RX ORDER — OMEPRAZOLE 40 MG/1
CAPSULE, DELAYED RELEASE ORAL
Qty: 90 CAPSULE | Refills: 1 | Status: SHIPPED | OUTPATIENT
Start: 2023-10-27 | End: 2024-01-26

## 2023-12-01 LAB
BASOPHILS # BLD AUTO: 0 X10E3/UL (ref 0–0.2)
BASOPHILS NFR BLD AUTO: 1 %
EOSINOPHIL # BLD AUTO: 0.2 X10E3/UL (ref 0–0.4)
EOSINOPHIL NFR BLD AUTO: 5 %
ERYTHROCYTE [DISTWIDTH] IN BLOOD BY AUTOMATED COUNT: 14 % (ref 11.7–15.4)
HCT VFR BLD AUTO: 44.8 % (ref 34–46.6)
HGB BLD-MCNC: 14.5 G/DL (ref 11.1–15.9)
IMM GRANULOCYTES # BLD AUTO: 0 X10E3/UL (ref 0–0.1)
IMM GRANULOCYTES NFR BLD AUTO: 0 %
LYMPHOCYTES # BLD AUTO: 2.1 X10E3/UL (ref 0.7–3.1)
LYMPHOCYTES NFR BLD AUTO: 41 %
MCH RBC QN AUTO: 25.6 PG (ref 26.6–33)
MCHC RBC AUTO-ENTMCNC: 32.4 G/DL (ref 31.5–35.7)
MCV RBC AUTO: 79 FL (ref 79–97)
MONOCYTES # BLD AUTO: 0.4 X10E3/UL (ref 0.1–0.9)
MONOCYTES NFR BLD AUTO: 8 %
NEUTROPHILS # BLD AUTO: 2.3 X10E3/UL (ref 1.4–7)
NEUTROPHILS NFR BLD AUTO: 45 %
PLATELET # BLD AUTO: 335 X10E3/UL (ref 150–450)
RBC # BLD AUTO: 5.67 X10E6/UL (ref 3.77–5.28)
WBC # BLD AUTO: 5.1 X10E3/UL (ref 3.4–10.8)

## 2023-12-02 LAB
ALBUMIN SERPL-MCNC: 4.5 G/DL (ref 3.8–4.8)
ALBUMIN/CREAT UR: 7 MG/G CREAT (ref 0–29)
ALBUMIN/GLOB SERPL: 2 {RATIO} (ref 1.2–2.2)
ALP SERPL-CCNC: 92 IU/L (ref 44–121)
ALT SERPL-CCNC: 25 IU/L (ref 0–32)
AST SERPL-CCNC: 22 IU/L (ref 0–40)
BILIRUB SERPL-MCNC: 0.4 MG/DL (ref 0–1.2)
BUN SERPL-MCNC: 16 MG/DL (ref 8–27)
BUN/CREAT SERPL: 16 (ref 12–28)
CALCIUM SERPL-MCNC: 10 MG/DL (ref 8.7–10.3)
CHLORIDE SERPL-SCNC: 98 MMOL/L (ref 96–106)
CHOLEST SERPL-MCNC: 167 MG/DL (ref 100–199)
CO2 SERPL-SCNC: 20 MMOL/L (ref 20–29)
CREAT SERPL-MCNC: 0.99 MG/DL (ref 0.57–1)
CREAT UR-MCNC: 164.9 MG/DL
EGFRCR SERPLBLD CKD-EPI 2021: 61 ML/MIN/1.73
GLOBULIN SER CALC-MCNC: 2.2 G/DL (ref 1.5–4.5)
GLUCOSE SERPL-MCNC: 144 MG/DL (ref 70–99)
HBA1C MFR BLD: 7.3 % (ref 4.8–5.6)
HDLC SERPL-MCNC: 49 MG/DL
LDLC SERPL CALC-MCNC: 97 MG/DL (ref 0–99)
MICROALBUMIN UR-MCNC: 11.1 UG/ML
POTASSIUM SERPL-SCNC: 4.6 MMOL/L (ref 3.5–5.2)
PROT SERPL-MCNC: 6.7 G/DL (ref 6–8.5)
SODIUM SERPL-SCNC: 137 MMOL/L (ref 134–144)
TRIGL SERPL-MCNC: 115 MG/DL (ref 0–149)
VLDLC SERPL CALC-MCNC: 21 MG/DL (ref 5–40)

## 2023-12-11 ENCOUNTER — OFFICE VISIT (OUTPATIENT)
Dept: FAMILY MEDICINE | Facility: CLINIC | Age: 73
End: 2023-12-11
Payer: COMMERCIAL

## 2023-12-11 VITALS
BODY MASS INDEX: 36.8 KG/M2 | SYSTOLIC BLOOD PRESSURE: 130 MMHG | OXYGEN SATURATION: 95 % | DIASTOLIC BLOOD PRESSURE: 68 MMHG | HEART RATE: 111 BPM | TEMPERATURE: 97.6 F | WEIGHT: 200 LBS | HEIGHT: 62 IN

## 2023-12-11 DIAGNOSIS — E11.65 TYPE 2 DIABETES MELLITUS WITH HYPERGLYCEMIA, WITHOUT LONG-TERM CURRENT USE OF INSULIN (CMS/HCC): ICD-10-CM

## 2023-12-11 DIAGNOSIS — E66.812 CLASS 2 SEVERE OBESITY DUE TO EXCESS CALORIES WITH SERIOUS COMORBIDITY AND BODY MASS INDEX (BMI) OF 36.0 TO 36.9 IN ADULT (CMS/HCC): Primary | ICD-10-CM

## 2023-12-11 DIAGNOSIS — I10 BENIGN ESSENTIAL HYPERTENSION: ICD-10-CM

## 2023-12-11 DIAGNOSIS — Z78.0 POST-MENOPAUSE: ICD-10-CM

## 2023-12-11 DIAGNOSIS — Z12.31 SCREENING MAMMOGRAM, ENCOUNTER FOR: ICD-10-CM

## 2023-12-11 DIAGNOSIS — E78.2 MULTIPLE-TYPE HYPERLIPIDEMIA: ICD-10-CM

## 2023-12-11 DIAGNOSIS — E66.01 CLASS 2 SEVERE OBESITY DUE TO EXCESS CALORIES WITH SERIOUS COMORBIDITY AND BODY MASS INDEX (BMI) OF 36.0 TO 36.9 IN ADULT (CMS/HCC): Primary | ICD-10-CM

## 2023-12-11 PROCEDURE — 3078F DIAST BP <80 MM HG: CPT | Performed by: FAMILY MEDICINE

## 2023-12-11 PROCEDURE — 3008F BODY MASS INDEX DOCD: CPT | Performed by: FAMILY MEDICINE

## 2023-12-11 PROCEDURE — 3075F SYST BP GE 130 - 139MM HG: CPT | Performed by: FAMILY MEDICINE

## 2023-12-11 PROCEDURE — 99214 OFFICE O/P EST MOD 30 MIN: CPT | Performed by: FAMILY MEDICINE

## 2023-12-11 ASSESSMENT — ENCOUNTER SYMPTOMS
NUMBNESS: 0
FATIGUE: 0
BACK PAIN: 0
APPETITE CHANGE: 0
CONSTIPATION: 0
WHEEZING: 0
FREQUENCY: 0
DIZZINESS: 0
TROUBLE SWALLOWING: 0
ACTIVITY CHANGE: 0
ABDOMINAL PAIN: 0
SORE THROAT: 0
EYE PAIN: 0
NERVOUS/ANXIOUS: 0
SHORTNESS OF BREATH: 0
COUGH: 0
NAUSEA: 0
FEVER: 0
ARTHRALGIAS: 0
VOMITING: 0
DIARRHEA: 0
HEMATURIA: 0

## 2023-12-11 ASSESSMENT — PATIENT HEALTH QUESTIONNAIRE - PHQ9: SUM OF ALL RESPONSES TO PHQ9 QUESTIONS 1 & 2: 0

## 2023-12-11 NOTE — PROGRESS NOTES
Subjective      Patient ID: Fanny Reddy is a 73 y.o. female.    HPI     dm2 - bump in A1c - pt disappointed in hersefl but is back on track  htn - stable  hld - stable      The following have been reviewed and updated as appropriate in this visit:   Allergies  Meds  Problems       Review of Systems   Constitutional: Negative for activity change, appetite change, fatigue and fever.   HENT: Negative for congestion, ear pain, postnasal drip, sore throat and trouble swallowing.    Eyes: Negative for pain.   Respiratory: Negative for cough, shortness of breath and wheezing.    Cardiovascular: Negative for chest pain.   Gastrointestinal: Negative for abdominal pain, constipation, diarrhea, nausea and vomiting.   Genitourinary: Negative for frequency and hematuria.   Musculoskeletal: Negative for arthralgias and back pain.   Skin: Negative for rash.   Neurological: Negative for dizziness and numbness.   Psychiatric/Behavioral: The patient is not nervous/anxious.        Social History     Socioeconomic History   • Marital status: Single     Spouse name: Not on file   • Number of children: Not on file   • Years of education: Not on file   • Highest education level: Not on file   Occupational History   • Not on file   Tobacco Use   • Smoking status: Never   • Smokeless tobacco: Never   Substance and Sexual Activity   • Alcohol use: Yes     Comment: socially at celebratory events   • Drug use: No   • Sexual activity: Never   Other Topics Concern   • Not on file   Social History Narrative   • Not on file     Social Determinants of Health     Financial Resource Strain: Not on file   Food Insecurity: Not on file   Transportation Needs: Not on file   Physical Activity: Not on file   Stress: Not on file   Social Connections: Not on file   Intimate Partner Violence: Not on file   Housing Stability: Not on file       Objective     Vitals:    12/11/23 1339   BP: 130/68   Pulse: (!) 111   Temp: 36.4 °C (97.6 °F)   SpO2: 95%  "  Weight: 90.7 kg (200 lb)   Height: 1.575 m (5' 2\")     Body mass index is 36.58 kg/m².    Physical Exam  Constitutional:       Appearance: Normal appearance. She is well-developed. She is obese.   HENT:      Head: Normocephalic and atraumatic.   Neck:      Thyroid: No thyromegaly.   Cardiovascular:      Rate and Rhythm: Normal rate and regular rhythm.      Heart sounds: Normal heart sounds. No murmur heard.     No gallop.   Pulmonary:      Effort: Pulmonary effort is normal. No respiratory distress.      Breath sounds: Normal breath sounds. No wheezing.   Musculoskeletal:      Right lower leg: No edema.      Left lower leg: No edema.   Skin:     General: Skin is warm and dry.      Findings: No erythema or rash.   Neurological:      General: No focal deficit present.      Mental Status: She is alert and oriented to person, place, and time. Mental status is at baseline.   Psychiatric:         Mood and Affect: Mood normal.         Behavior: Behavior normal.         Thought Content: Thought content normal.         Judgment: Judgment normal.         Assessment/Plan   Problem List Items Addressed This Visit        Circulatory    Benign essential hypertension     Stable, cont current regimen  Counseled low salt/sodium diet and increased cardiovascular exercise           Relevant Orders    CBC and Differential    Comprehensive metabolic panel    Hemoglobin A1c    Lipid panel    Microalbumin/Creatinine Ur Random       Endocrine/Metabolic    Type 2 diabetes mellitus with hyperglycemia (CMS/HCC)     Lab Results   Component Value Date    HGBA1C 7.3 (H) 12/01/2023     Cont current regimen  Cont arb and statin         Relevant Orders    CBC and Differential    Comprehensive metabolic panel    Hemoglobin A1c    Lipid panel    Microalbumin/Creatinine Ur Random    Class 2 severe obesity due to excess calories with serious comorbidity and body mass index (BMI) of 36.0 to 36.9 in adult  - Primary     Encouraged DASH/ADA diet with " "caloric appropriate portioning and limiting salt intake to < 2000mg/day. Encouraged regular aerobic exercise for at least 120-150 mins/week for cardioprotective benefits. Discussed goal BMI < 30 for morbidity/mortality benefits.             Relevant Orders    CBC and Differential    Comprehensive metabolic panel    Hemoglobin A1c    Lipid panel    Microalbumin/Creatinine Ur Random       Other    Multiple-type hyperlipidemia     Cont statin    Lab Results   Component Value Date    CHOL 167 12/01/2023    CHOL 163 05/18/2023    CHOL 167 11/16/2022     Lab Results   Component Value Date    HDL 49 12/01/2023    HDL 59 05/18/2023    HDL 47 11/16/2022     Lab Results   Component Value Date    LDLCALC 97 12/01/2023    LDLCALC 89 05/18/2023    LDLCALC 104 (H) 11/16/2022     Lab Results   Component Value Date    TRIG 115 12/01/2023    TRIG 77 05/18/2023    TRIG 87 11/16/2022     No results found for: \"CHOLHDL\"           Relevant Orders    CBC and Differential    Comprehensive metabolic panel    Hemoglobin A1c    Lipid panel    Microalbumin/Creatinine Ur Random   Other Visit Diagnoses     Screening mammogram, encounter for        Relevant Orders    BI SCREENING MAMMOGRAM BILATERAL(TOMOSYNTHESIS)    Post-menopause        Relevant Orders    DEXA BONE DENSITY        "

## 2023-12-11 NOTE — ASSESSMENT & PLAN NOTE
"Cont statin    Lab Results   Component Value Date    CHOL 167 12/01/2023    CHOL 163 05/18/2023    CHOL 167 11/16/2022     Lab Results   Component Value Date    HDL 49 12/01/2023    HDL 59 05/18/2023    HDL 47 11/16/2022     Lab Results   Component Value Date    LDLCALC 97 12/01/2023    LDLCALC 89 05/18/2023    LDLCALC 104 (H) 11/16/2022     Lab Results   Component Value Date    TRIG 115 12/01/2023    TRIG 77 05/18/2023    TRIG 87 11/16/2022     No results found for: \"CHOLHDL\"    "

## 2023-12-11 NOTE — ASSESSMENT & PLAN NOTE
Lab Results   Component Value Date    HGBA1C 7.3 (H) 12/01/2023     Cont current regimen  Cont arb and statin

## 2024-01-08 RX ORDER — ROSUVASTATIN CALCIUM 20 MG/1
TABLET, COATED ORAL
Qty: 90 TABLET | Refills: 0 | Status: SHIPPED | OUTPATIENT
Start: 2024-01-08 | End: 2024-03-12

## 2024-01-19 RX ORDER — GABAPENTIN 100 MG/1
100 CAPSULE ORAL 3 TIMES DAILY
Qty: 270 CAPSULE | Refills: 1 | Status: SHIPPED | OUTPATIENT
Start: 2024-01-19 | End: 2024-03-29

## 2024-01-26 ENCOUNTER — OFFICE VISIT (OUTPATIENT)
Dept: FAMILY MEDICINE | Facility: CLINIC | Age: 74
End: 2024-01-26
Payer: COMMERCIAL

## 2024-01-26 VITALS
TEMPERATURE: 97.6 F | HEART RATE: 132 BPM | WEIGHT: 198 LBS | BODY MASS INDEX: 36.44 KG/M2 | OXYGEN SATURATION: 98 % | HEIGHT: 62 IN

## 2024-01-26 DIAGNOSIS — E66.812 CLASS 2 SEVERE OBESITY DUE TO EXCESS CALORIES WITH SERIOUS COMORBIDITY AND BODY MASS INDEX (BMI) OF 36.0 TO 36.9 IN ADULT (CMS/HCC): ICD-10-CM

## 2024-01-26 DIAGNOSIS — M54.10 RADICULOPATHY AFFECTING UPPER EXTREMITY: Primary | ICD-10-CM

## 2024-01-26 DIAGNOSIS — Z12.31 SCREENING MAMMOGRAM FOR BREAST CANCER: ICD-10-CM

## 2024-01-26 DIAGNOSIS — E66.01 CLASS 2 SEVERE OBESITY DUE TO EXCESS CALORIES WITH SERIOUS COMORBIDITY AND BODY MASS INDEX (BMI) OF 36.0 TO 36.9 IN ADULT (CMS/HCC): ICD-10-CM

## 2024-01-26 DIAGNOSIS — E11.65 TYPE 2 DIABETES MELLITUS WITH HYPERGLYCEMIA, WITHOUT LONG-TERM CURRENT USE OF INSULIN (CMS/HCC): ICD-10-CM

## 2024-01-26 PROCEDURE — 99214 OFFICE O/P EST MOD 30 MIN: CPT | Performed by: FAMILY MEDICINE

## 2024-01-26 PROCEDURE — 3008F BODY MASS INDEX DOCD: CPT | Performed by: FAMILY MEDICINE

## 2024-01-26 RX ORDER — LOSARTAN POTASSIUM AND HYDROCHLOROTHIAZIDE 12.5; 5 MG/1; MG/1
1 TABLET ORAL DAILY
Qty: 90 TABLET | Refills: 1 | Status: SHIPPED | OUTPATIENT
Start: 2024-01-26 | End: 2024-07-28

## 2024-01-26 ASSESSMENT — ENCOUNTER SYMPTOMS
ARTHRALGIAS: 0
FEVER: 0
NUMBNESS: 1
ACTIVITY CHANGE: 0
BACK PAIN: 0
WEAKNESS: 0
DIZZINESS: 0
SHORTNESS OF BREATH: 0
WHEEZING: 0
FATIGUE: 0
COUGH: 0
APPETITE CHANGE: 0
NERVOUS/ANXIOUS: 0

## 2024-01-26 NOTE — PROGRESS NOTES
"Subjective      Patient ID: Fanny Reddy is a 73 y.o. female.    HPI     Past 7-10 days left pinky finger with zingers. When she goes to push herself up and get out of a chair gets the pain. Sometimes when typing and uses that finger for the letters can feel it    No recent trauma/injury    No other fingers, no hand weakness  Right hand dominant    dm2 - stable and controlled     The following have been reviewed and updated as appropriate in this visit:   Allergies  Meds  Problems       Review of Systems   Constitutional: Negative for activity change, appetite change, fatigue and fever.   Respiratory: Negative for cough, shortness of breath and wheezing.    Cardiovascular: Negative for chest pain.   Musculoskeletal: Negative for arthralgias and back pain.   Skin: Negative for rash.   Neurological: Positive for numbness. Negative for dizziness and weakness.   Psychiatric/Behavioral: The patient is not nervous/anxious.        Social History     Socioeconomic History   • Marital status: Single     Spouse name: Not on file   • Number of children: Not on file   • Years of education: Not on file   • Highest education level: Not on file   Occupational History   • Not on file   Tobacco Use   • Smoking status: Never   • Smokeless tobacco: Never   Substance and Sexual Activity   • Alcohol use: Yes     Comment: socially at celebratory events   • Drug use: No   • Sexual activity: Never   Other Topics Concern   • Not on file   Social History Narrative   • Not on file     Social Determinants of Health     Financial Resource Strain: Not on file   Food Insecurity: Not on file   Transportation Needs: Not on file   Physical Activity: Not on file   Stress: Not on file   Social Connections: Not on file   Intimate Partner Violence: Not on file   Housing Stability: Not on file       Objective     Vitals:    01/26/24 1203   Pulse: (!) 132   Temp: 36.4 °C (97.6 °F)   SpO2: 98%   Weight: 89.8 kg (198 lb)   Height: 1.575 m (5' 2\") "     Body mass index is 36.21 kg/m².    Physical Exam  Constitutional:       Appearance: Normal appearance. She is well-developed.   HENT:      Head: Normocephalic and atraumatic.   Neck:      Thyroid: No thyromegaly.   Musculoskeletal:         General: No swelling, tenderness, deformity or signs of injury.      Right lower leg: No edema.      Left lower leg: No edema.   Skin:     General: Skin is warm and dry.      Findings: No erythema or rash.   Neurological:      General: No focal deficit present.      Mental Status: She is alert and oriented to person, place, and time. Mental status is at baseline.      Sensory: No sensory deficit.   Psychiatric:         Mood and Affect: Mood normal.         Behavior: Behavior normal.         Thought Content: Thought content normal.         Judgment: Judgment normal.         Assessment/Plan   Problem List Items Addressed This Visit        Endocrine/Metabolic    Type 2 diabetes mellitus with hyperglycemia (CMS/HCC)     Lab Results   Component Value Date    HGBA1C 7.3 (H) 12/01/2023     Cont current regimen  Has opthho next week with Dr David Villasenor arb and statin         Class 2 severe obesity due to excess calories with serious comorbidity and body mass index (BMI) of 36.0 to 36.9 in adult      Encouraged DASH/ADA diet with caloric appropriate portioning and limiting salt intake to < 2000mg/day. Encouraged regular aerobic exercise for at least 120-150 mins/week for cardioprotective benefits. Discussed goal BMI < 30 for morbidity/mortality benefits.            Other Visit Diagnoses     Radiculopathy affecting upper extremity    -  Primary    start gabapentin, if no improvement will go to get EMG done    Relevant Orders    EMG    Screening mammogram for breast cancer        Relevant Orders    BI SCREENING MAMMOGRAM BILATERAL(TOMOSYNTHESIS)

## 2024-01-26 NOTE — ASSESSMENT & PLAN NOTE
Lab Results   Component Value Date    HGBA1C 7.3 (H) 12/01/2023     Cont current regimen  Has opthho next week with Dr Hernandez  Cont arb and statin

## 2024-02-06 DIAGNOSIS — G62.9 NEUROPATHY: Primary | ICD-10-CM

## 2024-02-08 ENCOUNTER — HOSPITAL ENCOUNTER (OUTPATIENT)
Dept: RADIOLOGY | Facility: HOSPITAL | Age: 74
Discharge: HOME | End: 2024-02-08
Attending: FAMILY MEDICINE
Payer: COMMERCIAL

## 2024-02-08 DIAGNOSIS — Z12.31 SCREENING MAMMOGRAM, ENCOUNTER FOR: ICD-10-CM

## 2024-02-08 DIAGNOSIS — Z78.0 POST-MENOPAUSE: ICD-10-CM

## 2024-02-08 PROCEDURE — 77067 SCR MAMMO BI INCL CAD: CPT

## 2024-02-08 PROCEDURE — 77080 DXA BONE DENSITY AXIAL: CPT

## 2024-02-27 RX ORDER — VALACYCLOVIR HYDROCHLORIDE 500 MG/1
500 TABLET, FILM COATED ORAL DAILY
Qty: 90 TABLET | Refills: 1 | Status: SHIPPED | OUTPATIENT
Start: 2024-02-27 | End: 2024-11-13 | Stop reason: SDUPTHER

## 2024-03-01 DIAGNOSIS — G56.22 ULNAR NEUROPATHY OF LEFT UPPER EXTREMITY: Primary | ICD-10-CM

## 2024-03-09 RX ORDER — METFORMIN HYDROCHLORIDE 1000 MG/1
1000 TABLET ORAL 2 TIMES DAILY WITH MEALS
Qty: 180 TABLET | Refills: 1 | Status: SHIPPED | OUTPATIENT
Start: 2024-03-09 | End: 2024-05-31 | Stop reason: SDUPTHER

## 2024-03-12 RX ORDER — ROSUVASTATIN CALCIUM 20 MG/1
TABLET, COATED ORAL
Qty: 90 TABLET | Refills: 0 | Status: SHIPPED | OUTPATIENT
Start: 2024-03-12 | End: 2024-07-11

## 2024-03-28 ENCOUNTER — TRANSCRIBE ORDERS (OUTPATIENT)
Dept: RADIOLOGY | Facility: HOSPITAL | Age: 74
End: 2024-03-28

## 2024-03-28 DIAGNOSIS — R20.0 ANESTHESIA OF SKIN: Primary | ICD-10-CM

## 2024-03-29 ENCOUNTER — OFFICE VISIT (OUTPATIENT)
Dept: FAMILY MEDICINE | Facility: CLINIC | Age: 74
End: 2024-03-29
Payer: COMMERCIAL

## 2024-03-29 VITALS
HEIGHT: 62 IN | BODY MASS INDEX: 36.44 KG/M2 | WEIGHT: 198 LBS | TEMPERATURE: 97.6 F | HEART RATE: 129 BPM | OXYGEN SATURATION: 97 %

## 2024-03-29 DIAGNOSIS — M79.602 PAIN OF LEFT UPPER EXTREMITY: Primary | ICD-10-CM

## 2024-03-29 PROCEDURE — 3008F BODY MASS INDEX DOCD: CPT | Performed by: FAMILY MEDICINE

## 2024-03-29 PROCEDURE — 99213 OFFICE O/P EST LOW 20 MIN: CPT | Performed by: FAMILY MEDICINE

## 2024-03-29 NOTE — PROGRESS NOTES
Subjective      Patient ID: Fanny Reddy is a 74 y.o. female.    HPI  Dull arm ache/pain  Under left arm/axilla area  Utd on mammo so knows it's not related to that  It's dull and present but doesn't bother her    Is following up with neuro  Has been undergoing workup for tingling/numbness in left pinky finger so not sure if it's all related  Had EMG for irritation  Has MRI scheduled     The following have been reviewed and updated as appropriate in this visit:        Review of Systems   Constitutional: Negative for activity change, appetite change, fatigue and fever.   Respiratory: Negative for cough, shortness of breath and wheezing.    Cardiovascular: Negative for chest pain.   Gastrointestinal: Negative for abdominal pain, constipation, diarrhea, nausea and vomiting.   Genitourinary: Negative for frequency and hematuria.   Musculoskeletal: Negative for arthralgias and back pain.   Skin: Negative for rash.   Neurological: Positive for numbness. Negative for dizziness and weakness.   Psychiatric/Behavioral: The patient is not nervous/anxious.        Social History     Socioeconomic History   • Marital status: Single     Spouse name: Not on file   • Number of children: Not on file   • Years of education: Not on file   • Highest education level: Not on file   Occupational History   • Not on file   Tobacco Use   • Smoking status: Never   • Smokeless tobacco: Never   Substance and Sexual Activity   • Alcohol use: Yes     Comment: socially at celebratory events   • Drug use: No   • Sexual activity: Never   Other Topics Concern   • Not on file   Social History Narrative   • Not on file     Social Determinants of Health     Financial Resource Strain: Not on file   Food Insecurity: Not on file   Transportation Needs: Not on file   Physical Activity: Not on file   Stress: Not on file   Social Connections: Not on file   Intimate Partner Violence: Not on file   Housing Stability: Not on file       Objective  "    Vitals:    03/29/24 1200   Pulse: (!) 129   Temp: 36.4 °C (97.6 °F)   SpO2: 97%   Weight: 89.8 kg (198 lb)   Height: 1.575 m (5' 2\")     Body mass index is 36.21 kg/m².    Physical Exam  Constitutional:       Appearance: Normal appearance. She is well-developed. She is obese.   HENT:      Head: Normocephalic and atraumatic.   Neck:      Thyroid: No thyromegaly.   Cardiovascular:      Rate and Rhythm: Normal rate and regular rhythm.      Heart sounds: Normal heart sounds. No murmur heard.     No gallop.   Pulmonary:      Effort: Pulmonary effort is normal. No respiratory distress.      Breath sounds: Normal breath sounds. No wheezing.   Musculoskeletal:         General: No tenderness or signs of injury.      Right lower leg: No edema.      Left lower leg: No edema.   Skin:     General: Skin is warm and dry.      Findings: No erythema or rash.   Neurological:      General: No focal deficit present.      Mental Status: She is alert and oriented to person, place, and time. Mental status is at baseline.   Psychiatric:         Mood and Affect: Mood normal.         Behavior: Behavior normal.         Thought Content: Thought content normal.         Judgment: Judgment normal.         Assessment/Plan   Problem List Items Addressed This Visit    None  Visit Diagnoses     Pain of left upper extremity    -  Primary    could be related to radiculopathy, will follow along with neuro/MRI results and eval        "

## 2024-04-01 LAB — MLHC DIABETIC EYE EXAM (EXTERNAL): NORMAL

## 2024-04-01 ASSESSMENT — ENCOUNTER SYMPTOMS
NAUSEA: 0
WHEEZING: 0
SHORTNESS OF BREATH: 0
NUMBNESS: 1
DIARRHEA: 0
ABDOMINAL PAIN: 0
FREQUENCY: 0
BACK PAIN: 0
FEVER: 0
VOMITING: 0
CONSTIPATION: 0
DIZZINESS: 0
FATIGUE: 0
HEMATURIA: 0
WEAKNESS: 0
ARTHRALGIAS: 0
COUGH: 0
APPETITE CHANGE: 0
NERVOUS/ANXIOUS: 0
ACTIVITY CHANGE: 0

## 2024-04-09 ENCOUNTER — ANESTHESIA EVENT (OUTPATIENT)
Dept: ENDOSCOPY | Facility: HOSPITAL | Age: 74
End: 2024-04-09
Payer: COMMERCIAL

## 2024-04-10 ENCOUNTER — HOSPITAL ENCOUNTER (OUTPATIENT)
Facility: HOSPITAL | Age: 74
Discharge: HOME | End: 2024-04-10
Attending: INTERNAL MEDICINE | Admitting: INTERNAL MEDICINE
Payer: COMMERCIAL

## 2024-04-10 ENCOUNTER — ANESTHESIA (OUTPATIENT)
Dept: ENDOSCOPY | Facility: HOSPITAL | Age: 74
End: 2024-04-10
Payer: COMMERCIAL

## 2024-04-10 VITALS
DIASTOLIC BLOOD PRESSURE: 70 MMHG | WEIGHT: 197 LBS | HEART RATE: 74 BPM | SYSTOLIC BLOOD PRESSURE: 143 MMHG | TEMPERATURE: 97 F | RESPIRATION RATE: 16 BRPM | HEIGHT: 62 IN | BODY MASS INDEX: 36.25 KG/M2 | OXYGEN SATURATION: 100 %

## 2024-04-10 DIAGNOSIS — R11.0 NAUSEA: ICD-10-CM

## 2024-04-10 DIAGNOSIS — K21.9 GASTROESOPHAGEAL REFLUX DISEASE, UNSPECIFIED WHETHER ESOPHAGITIS PRESENT: ICD-10-CM

## 2024-04-10 LAB
GLUCOSE BLD-MCNC: 104 MG/DL (ref 70–99)
POCT TEST: ABNORMAL

## 2024-04-10 PROCEDURE — 71000001 HC PACU PHASE 1 INITIAL 30MIN: Performed by: INTERNAL MEDICINE

## 2024-04-10 PROCEDURE — 37000002 HC ANESTHESIA MAC: Performed by: INTERNAL MEDICINE

## 2024-04-10 PROCEDURE — 0DB68ZX EXCISION OF STOMACH, VIA NATURAL OR ARTIFICIAL OPENING ENDOSCOPIC, DIAGNOSTIC: ICD-10-PCS | Performed by: INTERNAL MEDICINE

## 2024-04-10 PROCEDURE — 63600000 HC DRUGS/DETAIL CODE: Mod: JW | Performed by: NURSE ANESTHETIST, CERTIFIED REGISTERED

## 2024-04-10 PROCEDURE — 88305 TISSUE EXAM BY PATHOLOGIST: CPT | Performed by: INTERNAL MEDICINE

## 2024-04-10 PROCEDURE — 25800000 HC PHARMACY IV SOLUTIONS: Performed by: NURSE ANESTHETIST, CERTIFIED REGISTERED

## 2024-04-10 PROCEDURE — 75000060 HC EGD BIOPSY: Performed by: INTERNAL MEDICINE

## 2024-04-10 PROCEDURE — 25000000 HC PHARMACY GENERAL: Performed by: NURSE ANESTHETIST, CERTIFIED REGISTERED

## 2024-04-10 PROCEDURE — 71000011 HC PACU PHASE 1 EA ADDL MIN: Performed by: INTERNAL MEDICINE

## 2024-04-10 RX ORDER — PROPOFOL 200MG/20ML
SYRINGE (ML) INTRAVENOUS AS NEEDED
Status: DISCONTINUED | OUTPATIENT
Start: 2024-04-10 | End: 2024-04-10 | Stop reason: SURG

## 2024-04-10 RX ORDER — IBUPROFEN 200 MG
16-32 TABLET ORAL AS NEEDED
Status: CANCELLED | OUTPATIENT
Start: 2024-04-10

## 2024-04-10 RX ORDER — SODIUM CHLORIDE 9 MG/ML
INJECTION, SOLUTION INTRAVENOUS CONTINUOUS PRN
Status: DISCONTINUED | OUTPATIENT
Start: 2024-04-10 | End: 2024-04-10 | Stop reason: SURG

## 2024-04-10 RX ORDER — DEXTROSE 50 % IN WATER (D50W) INTRAVENOUS SYRINGE
25 AS NEEDED
Status: CANCELLED | OUTPATIENT
Start: 2024-04-10

## 2024-04-10 RX ORDER — SODIUM CHLORIDE 9 MG/ML
INJECTION, SOLUTION INTRAVENOUS CONTINUOUS
Status: CANCELLED | OUTPATIENT
Start: 2024-04-10 | End: 2024-04-17

## 2024-04-10 RX ORDER — DEXTROSE 40 %
15-30 GEL (GRAM) ORAL AS NEEDED
Status: CANCELLED | OUTPATIENT
Start: 2024-04-10

## 2024-04-10 RX ORDER — PROPOFOL 10 MG/ML
INJECTION, EMULSION INTRAVENOUS CONTINUOUS PRN
Status: DISCONTINUED | OUTPATIENT
Start: 2024-04-10 | End: 2024-04-10 | Stop reason: SURG

## 2024-04-10 RX ORDER — LIDOCAINE HYDROCHLORIDE 10 MG/ML
INJECTION, SOLUTION EPIDURAL; INFILTRATION; INTRACAUDAL; PERINEURAL AS NEEDED
Status: DISCONTINUED | OUTPATIENT
Start: 2024-04-10 | End: 2024-04-10 | Stop reason: SURG

## 2024-04-10 RX ADMIN — LIDOCAINE HYDROCHLORIDE 5 ML: 10 INJECTION, SOLUTION EPIDURAL; INFILTRATION; INTRACAUDAL; PERINEURAL at 14:33

## 2024-04-10 RX ADMIN — PROPOFOL 40 MG: 10 INJECTION, EMULSION INTRAVENOUS at 14:33

## 2024-04-10 RX ADMIN — LIDOCAINE HYDROCHLORIDE 5 ML: 10 INJECTION, SOLUTION EPIDURAL; INFILTRATION; INTRACAUDAL; PERINEURAL at 14:30

## 2024-04-10 RX ADMIN — PROPOFOL 150 MCG/KG/MIN: 10 INJECTION, EMULSION INTRAVENOUS at 14:33

## 2024-04-10 RX ADMIN — SODIUM CHLORIDE: 9 INJECTION, SOLUTION INTRAVENOUS at 14:24

## 2024-04-10 ASSESSMENT — PAIN SCALES - GENERAL: PAIN_LEVEL: 0

## 2024-04-10 NOTE — OP NOTE
_______________________________________________________________________________  Patient Name: Fanny Reddy      Procedure Date: 4/10/2024 2:19 PM  MRN: [id number]                     Account Number: [id number]  YOB: 1950              Age: 74  Gender: Female                        Note Status: Finalized  Attending MD: DARÍO BETANCUR MD~PIPE,  _______________________________________________________________________________  Procedure:             Upper GI endoscopy  Indications:           Esophageal reflux, Nausea  Providers:             KALPANA MORRIS (Doctor)  Referring MD:          BIJU COTTO DO  Requesting Provider:  Medicines:             See the Anesthesia note for documentation of the  administered medications  Complications:         No immediate complications.  _______________________________________________________________________________  Procedure:             After obtaining informed consent, the endoscope was  passed under direct vision. Throughout the procedure,  the patient's blood pressure, pulse, and oxygen  saturations were monitored continuously. The endoscope  was introduced through the mouth, and advanced to the  second part of duodenum. The upper GI endoscopy was  accomplished without difficulty. The patient tolerated  the procedure well.  Findings:  The Z-line was regular and was found 34 cm from the incisors.  The examined esophagus was normal.  Diffuse moderately erythematous mucosa with stigmata of recent bleeding  was found in the gastric antrum. Biopsies were taken with a cold forceps  for histology. Verification of patient identification for the specimen  was done by the nurse. Estimated blood loss was minimal.  The gastric body was normal. Biopsies were taken with a cold forceps for  Helicobacter pylori testing. Verification of patient identification for  the specimen was done by the nurse. Estimated blood loss  was minimal.  The examined duodenum was normal.  Impression:            - Z-line regular, 34 cm from the incisors.  - Normal esophagus.  - Erythematous mucosa in the antrum. Biopsied.  - Normal gastric body. Biopsied.  - Normal examined duodenum.  Recommendation:        - Discharge patient to home (ambulatory).  - Resume previous diet indefinitely.  - Continue present medications.  - Await pathology results.  - Return to my office in 4 months.  Procedure Code(s):     --- Professional ---  09822, Esophagogastroduodenoscopy, flexible,  transoral; with biopsy, single or multiple  Diagnosis Code(s):     --- Professional ---  K31.89, Other diseases of stomach and duodenum  K21.9, Gastro-esophageal reflux disease without  esophagitis  R11.0, Nausea  CPT copyright 2021 [employer]. All rights reserved.  The codes documented in this report are preliminary and upon  review may  be revised to meet current compliance requirements.  Attending Participation:  I personally performed the entire procedure.  _____________________________________  DARÍO BETANCUR MD~PIPE  4/10/2024 2:49:41 PM  This report has been signed electronically.  Number of Addenda: 0  Note Initiated On: 4/10/2024 2:19 PM

## 2024-04-10 NOTE — ANESTHESIA PREPROCEDURE EVALUATION
Relevant Problems   CARDIOVASCULAR   (+) Benign essential hypertension      Other   (+) Type 2 diabetes mellitus with hyperglycemia (CMS/HCC)       Anesthesia ROS/MED HX      Cardiovascular   hypertension  Endo/Other   Diabetes  Body Habitus: Obese       Past Surgical History:   Procedure Laterality Date    HYSTERECTOMY         Physical Exam    Airway   Mallampati: III   TM distance: >3 FB   Neck ROM: full  Cardiovascular    Rhythm: regular   Rate: normalPulmonary    Decreased breath sounds        Anesthesia Plan    Plan: MAC    Technique: MAC      Airway: natural airway / supplemental oxygen    3 ASA  Anesthetic plan and risks discussed with: patient  Induction:    intravenous   Postop Plan:   Patient Disposition: phase II then home   Pain Management: IV analgesics  Comments:    Plan: Mac with GA backup

## 2024-04-10 NOTE — H&P
"   GI Consult Note          Subjective   Fanny Reddy is a 74 y.o. female who was admitted for Nausea [R11.0]  Gastroesophageal reflux disease, unspecified whether esophagitis present [K21.9].         Past Medical History:   Diagnosis Date    Cutaneous abscess of neck 2/7/2019    Hypertension     Lipid disorder     Type 2 diabetes mellitus (CMS/HCC)      Past Surgical History:   Procedure Laterality Date    HYSTERECTOMY       No Known Allergies    Home Medications:    aspirin, Take 1 tablet by mouth daily.    CONTOUR NEXT TEST STRIPS, Test twice daily    empagliflozin, Take 1 tablet (10 mg total) by mouth daily.    fluticasone propionate, Administer 1 spray into each nostril daily.    losartan-hydrochlorothiazide, Take 1 tablet by mouth daily.    metFORMIN, TAKE 1 TABLET BY MOUTH TWICE A DAY WITH FOOD    omeprazole, Take 1 capsule (40 mg total) by mouth daily.    rosuvastatin, TAKE 1 TABLET BY MOUTH EVERY DAY    traZODone, TAKE 1 SPLIT TABLET (25 MG TOTAL) BY MOUTH NIGHTLY.    valACYclovir, Take 1 tablet (500 mg total) by mouth daily.        Physical Exam    Physical Exam  Visit Vitals  BP (!) 152/91   Pulse 89   Temp 36.2 °C (97.1 °F) (Temporal)   Resp 16   Ht 1.575 m (5' 2\")   Wt 89.4 kg (197 lb)   SpO2 100%   BMI 36.03 kg/m²       General appearance: no distress  Head: normocephalic  Lungs: clear to auscultation anteriorly, no w/r/r  Heart: regular rate, no m/r/g  Abdomen: soft, non-tender; bowel sounds normal; no masses, no organomegaly  Neurologic: awake and alert and oriented        Assessment     Fanny Reddy is a 74 y.o. female who was admitted for Nausea [R11.0]  Gastroesophageal reflux disease, unspecified whether esophagitis present [K21.9].     Plan:  - Plan for EGD today for further evaluation.  - Patient may be discharged today after procedure if stable.   - Further recommendations to follow after completion of procedure.             Nathan Willams MD  4/10/2024          "

## 2024-04-12 LAB
CASE RPRT: NORMAL
CLINICAL INFO: NORMAL
PATH REPORT.ADDENDUM SPEC: NORMAL
PATH REPORT.FINAL DX SPEC: NORMAL
PATH REPORT.GROSS SPEC: NORMAL

## 2024-04-23 ENCOUNTER — HOSPITAL ENCOUNTER (OUTPATIENT)
Dept: RADIOLOGY | Facility: HOSPITAL | Age: 74
Discharge: HOME | End: 2024-04-23
Attending: PLASTIC SURGERY
Payer: COMMERCIAL

## 2024-04-23 DIAGNOSIS — R20.0 ANESTHESIA OF SKIN: ICD-10-CM

## 2024-04-23 PROCEDURE — 73221 MRI JOINT UPR EXTREM W/O DYE: CPT | Mod: LT

## 2024-05-31 RX ORDER — METFORMIN HYDROCHLORIDE 1000 MG/1
1000 TABLET ORAL 2 TIMES DAILY WITH MEALS
Qty: 180 TABLET | Refills: 1 | Status: SHIPPED | OUTPATIENT
Start: 2024-05-31 | End: 2025-03-13

## 2024-06-04 LAB
BASOPHILS # BLD AUTO: 0.1 X10E3/UL (ref 0–0.2)
BASOPHILS NFR BLD AUTO: 1 %
EOSINOPHIL # BLD AUTO: 0.2 X10E3/UL (ref 0–0.4)
EOSINOPHIL NFR BLD AUTO: 4 %
ERYTHROCYTE [DISTWIDTH] IN BLOOD BY AUTOMATED COUNT: 14.4 % (ref 11.7–15.4)
HCT VFR BLD AUTO: 43.1 % (ref 34–46.6)
HGB BLD-MCNC: 13.6 G/DL (ref 11.1–15.9)
IMM GRANULOCYTES # BLD AUTO: 0 X10E3/UL (ref 0–0.1)
IMM GRANULOCYTES NFR BLD AUTO: 0 %
LYMPHOCYTES # BLD AUTO: 2.2 X10E3/UL (ref 0.7–3.1)
LYMPHOCYTES NFR BLD AUTO: 47 %
MCH RBC QN AUTO: 25 PG (ref 26.6–33)
MCHC RBC AUTO-ENTMCNC: 31.6 G/DL (ref 31.5–35.7)
MCV RBC AUTO: 79 FL (ref 79–97)
MONOCYTES # BLD AUTO: 0.4 X10E3/UL (ref 0.1–0.9)
MONOCYTES NFR BLD AUTO: 8 %
NEUTROPHILS # BLD AUTO: 1.9 X10E3/UL (ref 1.4–7)
NEUTROPHILS NFR BLD AUTO: 40 %
PLATELET # BLD AUTO: 334 X10E3/UL (ref 150–450)
RBC # BLD AUTO: 5.45 X10E6/UL (ref 3.77–5.28)
WBC # BLD AUTO: 4.7 X10E3/UL (ref 3.4–10.8)

## 2024-06-05 LAB
ALBUMIN SERPL-MCNC: 4.4 G/DL (ref 3.8–4.8)
ALBUMIN/CREAT UR: 8 MG/G CREAT (ref 0–29)
ALBUMIN/GLOB SERPL: 2.1 {RATIO} (ref 1.2–2.2)
ALP SERPL-CCNC: 93 IU/L (ref 44–121)
ALT SERPL-CCNC: 25 IU/L (ref 0–32)
AST SERPL-CCNC: 24 IU/L (ref 0–40)
BILIRUB SERPL-MCNC: 0.4 MG/DL (ref 0–1.2)
BUN SERPL-MCNC: 16 MG/DL (ref 8–27)
BUN/CREAT SERPL: 18 (ref 12–28)
CALCIUM SERPL-MCNC: 9.6 MG/DL (ref 8.7–10.3)
CHLORIDE SERPL-SCNC: 101 MMOL/L (ref 96–106)
CHOLEST SERPL-MCNC: 191 MG/DL (ref 100–199)
CO2 SERPL-SCNC: 22 MMOL/L (ref 20–29)
CREAT SERPL-MCNC: 0.91 MG/DL (ref 0.57–1)
CREAT UR-MCNC: 82.7 MG/DL
EGFRCR SERPLBLD CKD-EPI 2021: 67 ML/MIN/1.73
GLOBULIN SER CALC-MCNC: 2.1 G/DL (ref 1.5–4.5)
GLUCOSE SERPL-MCNC: 178 MG/DL (ref 70–99)
HBA1C MFR BLD: 7.6 % (ref 4.8–5.6)
HDLC SERPL-MCNC: 64 MG/DL
LDLC SERPL CALC-MCNC: 107 MG/DL (ref 0–99)
MICROALBUMIN UR-MCNC: 6.9 UG/ML
POTASSIUM SERPL-SCNC: 4.6 MMOL/L (ref 3.5–5.2)
PROT SERPL-MCNC: 6.5 G/DL (ref 6–8.5)
SODIUM SERPL-SCNC: 138 MMOL/L (ref 134–144)
TRIGL SERPL-MCNC: 113 MG/DL (ref 0–149)
VLDLC SERPL CALC-MCNC: 20 MG/DL (ref 5–40)

## 2024-06-11 ENCOUNTER — OFFICE VISIT (OUTPATIENT)
Dept: FAMILY MEDICINE | Facility: CLINIC | Age: 74
End: 2024-06-11
Payer: COMMERCIAL

## 2024-06-11 VITALS
OXYGEN SATURATION: 97 % | DIASTOLIC BLOOD PRESSURE: 78 MMHG | WEIGHT: 200 LBS | TEMPERATURE: 97.5 F | BODY MASS INDEX: 36.8 KG/M2 | HEART RATE: 101 BPM | SYSTOLIC BLOOD PRESSURE: 124 MMHG | HEIGHT: 62 IN

## 2024-06-11 DIAGNOSIS — E55.9 VITAMIN D DEFICIENCY: ICD-10-CM

## 2024-06-11 DIAGNOSIS — E78.2 MULTIPLE-TYPE HYPERLIPIDEMIA: ICD-10-CM

## 2024-06-11 DIAGNOSIS — E66.812 CLASS 2 SEVERE OBESITY DUE TO EXCESS CALORIES WITH SERIOUS COMORBIDITY AND BODY MASS INDEX (BMI) OF 36.0 TO 36.9 IN ADULT (CMS/HCC): Primary | ICD-10-CM

## 2024-06-11 DIAGNOSIS — I10 BENIGN ESSENTIAL HYPERTENSION: ICD-10-CM

## 2024-06-11 DIAGNOSIS — E66.01 CLASS 2 SEVERE OBESITY DUE TO EXCESS CALORIES WITH SERIOUS COMORBIDITY AND BODY MASS INDEX (BMI) OF 36.0 TO 36.9 IN ADULT (CMS/HCC): Primary | ICD-10-CM

## 2024-06-11 DIAGNOSIS — E11.65 TYPE 2 DIABETES MELLITUS WITH HYPERGLYCEMIA, WITHOUT LONG-TERM CURRENT USE OF INSULIN (CMS/HCC): ICD-10-CM

## 2024-06-11 PROCEDURE — G0439 PPPS, SUBSEQ VISIT: HCPCS | Performed by: FAMILY MEDICINE

## 2024-06-11 ASSESSMENT — PATIENT HEALTH QUESTIONNAIRE - PHQ9: SUM OF ALL RESPONSES TO PHQ9 QUESTIONS 1 & 2: 0

## 2024-06-11 ASSESSMENT — MINI COG
TOTAL SCORE: 3
COMPLETED: YES

## 2024-06-11 NOTE — ASSESSMENT & PLAN NOTE
Lab Results   Component Value Date    HGBA1C 7.6 (H) 06/04/2024     Cont current regimen  Ophtho - utd  Cont arb and statin

## 2024-06-11 NOTE — PROGRESS NOTES
Subjective     Fanny Reddy is a 74 y.o. female who presents for a subsequent annual wellness visit.     Patient Care Team:  Lupe Mendez DO as PCP - General (Family Medicine)  Aaliyah Abreu MD as Resident (Family Medicine)    Comprehensive Medical and Social History  Patient Active Problem List   Diagnosis    Benign essential hypertension    Type 2 diabetes mellitus with hyperglycemia (CMS/[facility])    Multiple-type hyperlipidemia    Vitamin D deficiency    Class 2 severe obesity due to excess calories with serious comorbidity and body mass index (BMI) of 36.0 to 36.9 in adult (CMS/[facility])    Routine general medical examination at a health care facility    Localized swelling, mass or lump of neck    Other irritable bowel syndrome    Post-viral cough syndrome     Past Medical History:   Diagnosis Date    Cutaneous abscess of neck 2/7/2019    Hypertension     Lipid disorder     Type 2 diabetes mellitus (CMS/[facility])      Past Surgical History:   Procedure Laterality Date    HYSTERECTOMY       No Known Allergies  Current Outpatient Medications   Medication Sig Dispense Refill    aspirin (ASPIR-LOW) 81 mg enteric coated tablet Take 1 tablet by mouth daily.      blood sugar diagnostic (CONTOUR NEXT TEST STRIPS) strip Test twice daily 200 strip 3    empagliflozin (JARDIANCE) 10 mg tablet Take 1 tablet (10 mg total) by mouth daily. 90 tablet 1    fluticasone propionate (FLONASE) 50 mcg/actuation nasal spray Administer 1 spray into each nostril daily. 16 g 5    losartan-hydrochlorothiazide (HYZAAR) 50-12.5 mg per tablet Take 1 tablet by mouth daily. 90 tablet 1    metFORMIN (GLUCOPHAGE) 1,000 mg tablet Take 1 tablet (1,000 mg total) by mouth 2 (two) times a day with meals. 180 tablet 1    omeprazole (PriLOSEC) 40 mg capsule Take 1 capsule (40 mg total) by mouth daily. 90 capsule 1    rosuvastatin (CRESTOR) 20 mg tablet TAKE 1 TABLET BY MOUTH EVERY DAY 90 tablet 0    traZODone (DESYREL) 50 mg tablet TAKE 1 SPLIT  "TABLET (25 MG TOTAL) BY MOUTH NIGHTLY. 90 tablet 1    valACYclovir (VALTREX) 500 mg tablet Take 1 tablet (500 mg total) by mouth daily. 90 tablet 1     No current facility-administered medications for this visit.     Social History     Tobacco Use    Smoking status: Never    Smokeless tobacco: Never   Substance Use Topics    Alcohol use: Yes     Comment: socially at celebratory events    Drug use: No     No family history on file.    Objective   Vitals  Vitals:    06/11/24 1243   BP: 124/78   Pulse: (!) 101   Temp: 36.4 °C (97.5 °F)   SpO2: 97%   Weight: 90.7 kg (200 lb)   Height: 1.575 m (5' 2\")     Body mass index is 36.58 kg/m².    Advanced Care Plan  Does patient have advance directive?: Yes                                     PHQ  Will the patient answer the depression questions?: Yes   Little interest or pleasure in doing things: Not at all   Feeling down, depressed, or hopeless: Not at all   Depression Risk: 0                                             Mini Cog  Completed: Yes  Score: 3  Result: Positive      Get Up and Go  Result: Pass    STEADI Falls Risk  One or more falls in the last year: No           Has trouble stepping up onto a curb: No   Advised to use a cane or walker to get around safely: Yes   Often has to rush to the toilet: No   Feels unsteady when walking: Yes   Has lost some feeling in feet: No   Often feels sad or depressed: No   Steadies self on furniture while walking at home: Yes   Takes medication that makes him/her feel lightheaded or more tired than usual: No   Worried about falling: No   Takes medicine to sleep or improve mood: Yes   Needs to push with hands when rising from a chair: Yes   Falls screen completed: Yes     Hearing and Vision Screening  No results found.  See HRA for relevant hearing screening response.    Diet and Exercise            Assessment/Plan   Diagnoses and all orders for this visit:    Class 2 severe obesity due to excess calories with serious comorbidity and " "body mass index (BMI) of 36.0 to 36.9 in adult (CMS/[facility]) (Primary)  Assessment & Plan:  Encouraged DASH/ADA diet with caloric appropriate portioning and limiting salt intake to < 2000mg/day. Encouraged regular aerobic exercise for at least 120-150 mins/week for cardioprotective benefits. Discussed goal BMI < 30 for morbidity/mortality benefits.        Orders:  -     CBC and Differential; Future  -     Comprehensive metabolic panel; Future  -     Hemoglobin A1c; Future  -     Lipid panel; Future  -     Microalbumin/Creatinine Ur Random; Future  -     Vitamin D 25 hydroxy; Future    Multiple-type hyperlipidemia  Assessment & Plan:  Cont statin  Lab Results   Component Value Date    CHOL 191 06/04/2024    CHOL 167 12/01/2023    CHOL 163 05/18/2023     Lab Results   Component Value Date    HDL 64 06/04/2024    HDL 49 12/01/2023    HDL 59 05/18/2023     Lab Results   Component Value Date    LDLCALC 107 (H) 06/04/2024    LDLCALC 97 12/01/2023    LDLCALC 89 05/18/2023     Lab Results   Component Value Date    TRIG 113 06/04/2024    TRIG 115 12/01/2023    TRIG 77 05/18/2023     No results found for: \"CHOLHDL\"      Orders:  -     CBC and Differential; Future  -     Comprehensive metabolic panel; Future  -     Hemoglobin A1c; Future  -     Lipid panel; Future  -     Microalbumin/Creatinine Ur Random; Future  -     Vitamin D 25 hydroxy; Future    Type 2 diabetes mellitus with hyperglycemia, without long-term current use of insulin (CMS/HCC)  Assessment & Plan:  Lab Results   Component Value Date    HGBA1C 7.6 (H) 06/04/2024     Cont current regimen  Ophtho - utd  Cont arb and statin    Orders:  -     CBC and Differential; Future  -     Comprehensive metabolic panel; Future  -     Hemoglobin A1c; Future  -     Lipid panel; Future  -     Microalbumin/Creatinine Ur Random; Future  -     Vitamin D 25 hydroxy; Future    Vitamin D deficiency  Assessment & Plan:  Cont vitamin d supplement    Orders:  -     CBC and Differential; " Future  -     Comprehensive metabolic panel; Future  -     Hemoglobin A1c; Future  -     Lipid panel; Future  -     Microalbumin/Creatinine Ur Random; Future  -     Vitamin D 25 hydroxy; Future    Benign essential hypertension  Assessment & Plan:  Stable, cont current regimen  Counseled low salt/sodium diet and increased cardiovascular exercise            See Patient Instructions (the written plan) which was given to the patient for PPPS and health risk factors with interventions.     Mammo - utd  Ophtho - utd  Colonoscopy - utd  Dexa - utd

## 2024-06-11 NOTE — PATIENT INSTRUCTIONS
Your Personalized Prevention Plan Services (PPPS)    Preventive Services Checklist (Assumes Average Risk Unless Otherwise Noted):    Health Maintenance Topics with due status: Overdue       Topic Date Due    Diabetic Foot Exam 03/27/2019    Zoster Vaccine 12/04/2019    Medicare Annual Wellness Visit 11/21/2023    COVID-19 Vaccine 12/12/2023     Health Maintenance Topics with due status: Not Due       Topic Last Completion Date    Colorectal Cancer Screening 07/22/2020    DTaP, Tdap, and Td Vaccines 10/25/2020    Depression Screening 12/11/2023    Breast Cancer Screening 02/08/2024    DEXA Scan 02/08/2024    Annual Dilated Retinal Exam 04/01/2024    Diabetes Kidney Health Evaluation: eGFR 06/04/2024    Diabetes Kidney Health Evaluation:  uACR 06/04/2024    Hemoglobin A1C 06/04/2024    Falls Risk Screening 06/04/2024     Health Maintenance Topics with due status: Completed       Topic Last Completion Date    Pneumococcal (65 years and older) 09/19/2017    Hepatitis C Screening 09/23/2019    Influenza Vaccine 10/16/2023    RSV (60+ years old [shared decision making] or in pregnancy during 32 through 36 weeks) 01/31/2024     Health Maintenance Topics with due status: Aged Out       Topic Date Due    Meningococcal ACWY Aged Out    RSV <20 months Aged Out    HIB Vaccines Aged Out    Hepatitis B Vaccines Aged Out    IPV Vaccines Aged Out    HPV Vaccines Aged Out       You May Be Eligible for These Additional Preventive Services   (Assumes Average Risk Unless Otherwise Noted)  Diabetes Screening Any 1 risk factor: hypertension, dyslipidemia, obesity, high glucose; or Any 2 risk factors: >=66yo, overweight, family history diabetes (covered every 6 months)   Hepatitis C Screening Any 1 risk factor: 1) blood transfusion before 1992,   2) current or past injection drug use (annually for high risk; if born between 3175-7202, see above for status).   Vaccine: Hepatitis B As necessary if at-risk: hemophilia,  ESRD, diabetes, living with individual infected with hep B, healthcare worker with frequent contact with blood/bodily fluids (series covered once)   Sexually Transmitted Diseases (STDs) As necessary chlamydia, gonorrhea, syphilis, hepatitis B (covered annually)  HIV if any 1 risk factor present: 1) <14yo or >64yo and at increased risk or 2) 15-64yo and ask for it (covered annually)   Lung Cancer Screening Low dose chest CT if all three risk factors: 1) 50-76yo, 2) smoker or quit within last 15y, 3) >=20 pack years (covered annually).  No results found for this or any previous visit.       Cholesterol Screening Both risk factors: 1) >=21yo and 2)  increased risk coronary artery disease (covered every 5 years).   Breast Cancer Screening Covered once 35-38yo, annually >=41yo (if >=49yo, see above for status).   Glaucoma Screening Any 1 risk factor: 1) diabetes, 2) family history glaucoma, 3)  >=49yo, 4)  American >=64yo (covered annually)         Health Risk Factors with Personalized Education:  ----------------------------------------------------------------------------------------------------------------------  Controlling Your Blood Pressure  Maintain a normal weight (body mass index between 18.5 and 24.9).  Eat more fruit, vegetables and low-fat dairy.  Eat less saturated fat and total fat.  Lower your sodium (salt) intake.  Try to stay under 1500 mg per day, but if you cannot get your intake to be that low, at least lower it by 1000 mg.  Stay active.  Try to get at least 90 to 150 minutes of exercise per week.  Try brisk walking, swimming, bicycling or dancing.  Limit alcohol intake.  When you do consume alcohol, drink no more than 1 drink per day.  If you have been prescribed medication, take it regularly and exactly as prescribed.  Let your PCP know if you have any problems or questions about your medication.  Check your blood pressure at home or at the store.  Write down your readings  and share them with your PCP  ----------------------------------------------------------------------------------------------------------------------  Controlling Your Diabetes  Stress can raise your blood sugar.  Learn what causes your stress.  Learn to lower your stress by spending time with family and friends, exercising, deep breathing, trying meditation or yoga, enjoying hobbies and getting enough rest.  Let your PCP know if you’re having problems limiting your stress.  Maintain a healthy weight by balancing your diet and exercise.  Choose foods that are lower in calories, saturated fat, trans fat, sugar and salt.  Eat foods with more fiber, like whole grain cereals, bread, crackers, rice or pasta.  Choose to eat fruit, vegetables, and low-fat or fat-free/skim dairy.  Reduce the portion size of your meals.  Make half of your meal vegetables and fruit, and divide the other half between lean protein and whole grains.  Drink water instead of juice and regular soda.  Spend at least some time being active on most days of the week.  Work to increase your muscle strength at least twice per week.  Try things like light weights, stretch bands, yoga, heavy gardening (digging, planting with tools) or push-ups.  If you have been prescribed medication, take it regularly and exactly as prescribed.  Let your PCP know if you have any problems or questions about your medication.  If you have been asked to check your blood sugar, write down your readings and share them with your PCP  ----------------------------------------------------------------------------------------------------------------------  Controlling Your Cholesterol  Reduce the amount of saturated and trans fat in your diet.  Limit intake of red meat.  Consume only low-fat or non-fat/skim dairy.  Limit fried food.  Cook with vegetable oils.  Reduce your intake of sugary foods, sugary drinks and alcohol.  Eat a diet high in fruit, vegetables and whole grains.  Get  protein from fish, poultry and a small portion of nuts.  Stay active.  Try to get at least 90 to 150 minutes of exercise per week.  Try brisk walking, swimming, bicycling or dancing.  Maintain a healthy weight by balancing your diet and exercise.  If you have been prescribed medication, take it regularly and exactly as prescribed. Let your PCP know if you have any problems or questions about your medication.  It’s important to know your cholesterol numbers.  When recommended by your PCP, get the cholesterol blood test.  ----------------------------------------------------------------------------------------------------------------------  Maintaining Strong Bones  Try to get at least 90 to 150 minutes of weight-bearing exercise per week.  Ensure intake of at least 1200mg of calcium per day.  Eat foods high in calcium like milk and other dairy, green vegetables, fruit, canned fish with soft and edible bones, nuts, calcium-set tofu.  Some foods are calcium-fortified, like bread, cereal, fruit juices and mineral water.  Help your body make vitamin D by getting 10-15 minutes per day of sunlight.    Ensure intake of at least 600IU of vitamin D per day.  Eat foods high in vitamin D like oily fish (salmon, sardines, mackerel) and eggs.  Some foods are fortified with vitamin D, like dairy and cereals.  Avoid high amounts of caffeine and salt, since they can cause the body to loose calcium.  Limit alcohol intake, since it is associated with weaker bones and is associated with falls and fractures.  Limit intake of fizzy drinks.  ----------------------------------------------------------------------------------------------------------------------  Reducing Your Risk of Falls  Tell your PCP if any of your medications make you feel tired, dizzy, lightheaded or off-balance.  Maintain coordination, flexibility and balance by ensuring regular physical activity.  Limit alcohol intake to 1 drink per day.  Consider avoiding all alcohol  intake.  Ensure good vision.  Visit an ophthalmologist or optometrist regularly for vision screening or to make sure your glasses / contact lens prescription is correct.  If you need glasses or contacts, wear them.  When you get new glasses or contacts, take time to get used to them.  Do not wear sunglasses or tinted lenses when indoors.  Ensure good hearing.  Have your hearing checked if you are having trouble hearing, or family and friends think you cannot hear them.  If you need a hearing aid, be sure it fits well and wear it.  Get enough rest.  Ensure about 7-9 hours of sleep every day.  Get up slowly from your bed or chairs.  Do not start walking until you are sure you feel steady.  Wear non-skid, rubber-soled, low-heeled shoes.  Do not walk in socks, or in shoes and slippers with smooth soles.  If your PCP or therapist recommends using a cane or walker, use it regularly.  Make your home safer.  Increase lighting throughout the house, especially at the top and bottom of stairs.  Ensure lighting is easily turned on when getting up in the middle of the night.  Make sure there are two secure rails on all stairs.  Install grab bars in the bathtub / shower and near the toilet.  Consider using a shower chair and / or a hand-held shower.  Spread sand or salt on icy surfaces.  Beware of wet surfaces, which can be icy.  Tell your PCP if you have fallen.

## 2024-06-11 NOTE — ASSESSMENT & PLAN NOTE
"Cont statin  Lab Results   Component Value Date    CHOL 191 06/04/2024    CHOL 167 12/01/2023    CHOL 163 05/18/2023     Lab Results   Component Value Date    HDL 64 06/04/2024    HDL 49 12/01/2023    HDL 59 05/18/2023     Lab Results   Component Value Date    LDLCALC 107 (H) 06/04/2024    LDLCALC 97 12/01/2023    LDLCALC 89 05/18/2023     Lab Results   Component Value Date    TRIG 113 06/04/2024    TRIG 115 12/01/2023    TRIG 77 05/18/2023     No results found for: \"CHOLHDL\"    "

## 2024-07-05 DIAGNOSIS — R30.0 DYSURIA: Primary | ICD-10-CM

## 2024-07-08 DIAGNOSIS — R30.0 DYSURIA: Primary | ICD-10-CM

## 2024-07-11 RX ORDER — ROSUVASTATIN CALCIUM 20 MG/1
20 TABLET, COATED ORAL DAILY
Qty: 90 TABLET | Refills: 0 | Status: SHIPPED | OUTPATIENT
Start: 2024-07-11 | End: 2025-01-06

## 2024-07-11 RX ORDER — ROSUVASTATIN CALCIUM 20 MG/1
TABLET, COATED ORAL
Qty: 90 TABLET | Refills: 0 | Status: SHIPPED | OUTPATIENT
Start: 2024-07-11 | End: 2024-07-27 | Stop reason: SDUPTHER

## 2024-07-22 ENCOUNTER — TRANSCRIBE ORDERS (OUTPATIENT)
Dept: SCHEDULING | Age: 74
End: 2024-07-22

## 2024-07-22 DIAGNOSIS — S74.21XA: ICD-10-CM

## 2024-07-22 DIAGNOSIS — G57.11 MERALGIA PARESTHETICA, RIGHT LOWER LIMB: Primary | ICD-10-CM

## 2024-07-25 LAB
APPEARANCE UR: CLEAR
BACTERIA #/AREA URNS HPF: ABNORMAL /[HPF]
BACTERIA UR CULT: NORMAL
BACTERIA UR CULT: NORMAL
BILIRUB UR QL STRIP: NEGATIVE
CASTS URNS QL MICRO: ABNORMAL /LPF
COLOR UR: YELLOW
EPI CELLS #/AREA URNS HPF: >10 /HPF (ref 0–10)
GLUCOSE UR QL STRIP: ABNORMAL
HGB UR QL STRIP: NEGATIVE
KETONES UR QL STRIP: NEGATIVE
LAB CORP URINALYSIS REFLEX: ABNORMAL
LEUKOCYTE ESTERASE UR QL STRIP: ABNORMAL
MICRO URNS: ABNORMAL
NITRITE UR QL STRIP: NEGATIVE
PH UR STRIP: 6.5 [PH] (ref 5–7.5)
PROT UR QL STRIP: NEGATIVE
RBC #/AREA URNS HPF: ABNORMAL /HPF (ref 0–2)
SP GR UR STRIP: 1.03 (ref 1–1.03)
UROBILINOGEN UR STRIP-MCNC: 0.2 MG/DL (ref 0.2–1)
WBC #/AREA URNS HPF: ABNORMAL /HPF (ref 0–5)

## 2024-07-28 RX ORDER — ROSUVASTATIN CALCIUM 20 MG/1
20 TABLET, COATED ORAL DAILY
Qty: 90 TABLET | Refills: 0 | Status: SHIPPED | OUTPATIENT
Start: 2024-07-28 | End: 2024-11-22

## 2024-07-28 RX ORDER — EMPAGLIFLOZIN 10 MG/1
10 TABLET, FILM COATED ORAL DAILY
Qty: 90 TABLET | Refills: 1 | Status: SHIPPED | OUTPATIENT
Start: 2024-07-28

## 2024-07-28 RX ORDER — OMEPRAZOLE 40 MG/1
40 CAPSULE, DELAYED RELEASE ORAL DAILY
Qty: 90 CAPSULE | Refills: 1 | Status: SHIPPED | OUTPATIENT
Start: 2024-07-28 | End: 2025-02-28 | Stop reason: SDUPTHER

## 2024-07-28 RX ORDER — OMEPRAZOLE 40 MG/1
40 CAPSULE, DELAYED RELEASE ORAL DAILY
Qty: 90 CAPSULE | Refills: 1 | Status: SHIPPED | OUTPATIENT
Start: 2024-07-28 | End: 2024-11-22

## 2024-07-28 RX ORDER — LOSARTAN POTASSIUM AND HYDROCHLOROTHIAZIDE 12.5; 5 MG/1; MG/1
1 TABLET ORAL DAILY
Qty: 90 TABLET | Refills: 1 | Status: SHIPPED | OUTPATIENT
Start: 2024-07-28 | End: 2024-10-28

## 2024-07-28 RX ORDER — LOSARTAN POTASSIUM AND HYDROCHLOROTHIAZIDE 12.5; 5 MG/1; MG/1
1 TABLET ORAL DAILY
Qty: 90 TABLET | Refills: 1 | Status: SHIPPED | OUTPATIENT
Start: 2024-07-28 | End: 2024-11-22

## 2024-07-31 ENCOUNTER — HOSPITAL ENCOUNTER (OUTPATIENT)
Dept: RADIOLOGY | Facility: HOSPITAL | Age: 74
Discharge: HOME | End: 2024-07-31
Attending: ORTHOPAEDIC SURGERY
Payer: COMMERCIAL

## 2024-07-31 DIAGNOSIS — S74.21XA: ICD-10-CM

## 2024-07-31 DIAGNOSIS — G57.11 MERALGIA PARESTHETICA, RIGHT LOWER LIMB: ICD-10-CM

## 2024-07-31 PROCEDURE — 93971 EXTREMITY STUDY: CPT | Mod: RT

## 2024-08-12 ENCOUNTER — APPOINTMENT (EMERGENCY)
Dept: RADIOLOGY | Facility: HOSPITAL | Age: 74
End: 2024-08-12
Payer: COMMERCIAL

## 2024-08-12 ENCOUNTER — HOSPITAL ENCOUNTER (EMERGENCY)
Facility: HOSPITAL | Age: 74
Discharge: HOME | End: 2024-08-12
Attending: EMERGENCY MEDICINE
Payer: COMMERCIAL

## 2024-08-12 VITALS
DIASTOLIC BLOOD PRESSURE: 70 MMHG | WEIGHT: 196 LBS | SYSTOLIC BLOOD PRESSURE: 130 MMHG | HEART RATE: 80 BPM | HEIGHT: 62 IN | BODY MASS INDEX: 36.07 KG/M2 | TEMPERATURE: 98.5 F | OXYGEN SATURATION: 98 % | RESPIRATION RATE: 16 BRPM

## 2024-08-12 DIAGNOSIS — M25.562 ACUTE PAIN OF LEFT KNEE: ICD-10-CM

## 2024-08-12 DIAGNOSIS — M17.12 PRIMARY OSTEOARTHRITIS OF LEFT KNEE: Primary | ICD-10-CM

## 2024-08-12 PROCEDURE — 99283 EMERGENCY DEPT VISIT LOW MDM: CPT | Mod: 25

## 2024-08-12 PROCEDURE — 73564 X-RAY EXAM KNEE 4 OR MORE: CPT | Mod: LT

## 2024-08-12 RX ORDER — OXYCODONE HYDROCHLORIDE 5 MG/1
5 TABLET ORAL EVERY 6 HOURS PRN
Qty: 8 TABLET | Refills: 0 | Status: SHIPPED | OUTPATIENT
Start: 2024-08-12 | End: 2024-10-11 | Stop reason: HOSPADM

## 2024-08-12 ASSESSMENT — ENCOUNTER SYMPTOMS
DIAPHORESIS: 0
ABDOMINAL PAIN: 0
FEVER: 0

## 2024-08-12 NOTE — ED PROVIDER NOTES
Emergency Medicine Note  HPI   HISTORY OF PRESENT ILLNESS     Seen and Examined in ED RP J    This is a 74-year-old female presenting to the emergency department for evaluation of acute left knee pain.  States that he feels a pop out of socket, went to [facility], and scheduled for knee replacement.  The patient states that she had been given pain medications, had made her nauseous.  Did not seem to help with the pain.  The patient stating that the pain has been continuous.  Isolated to the knee joint.  The patient has been told that she needs surgery given bone-on-bone arthritis.                 Patient History   PAST HISTORY     Reviewed from Nursing Triage:       Past Medical History:   Diagnosis Date    Cutaneous abscess of neck 2/7/2019    Hypertension     Lipid disorder     Type 2 diabetes mellitus (CMS/[facility])        Past Surgical History:   Procedure Laterality Date    HYSTERECTOMY         No family history on file.    Social History     Tobacco Use    Smoking status: Never    Smokeless tobacco: Never   Substance Use Topics    Alcohol use: Yes     Comment: socially at celebratory events    Drug use: No         Review of Systems   REVIEW OF SYSTEMS     Review of Systems   Constitutional:  Negative for diaphoresis and fever.   Gastrointestinal:  Negative for abdominal pain.         VITALS     ED Vitals      Date/Time Temp Pulse Resp BP SpO2 [facility]   08/12/24 1153 36.9 °C (98.5 °F) 81 18 131/73 99 % DHF                         Physical Exam   PHYSICAL EXAM     Physical Exam  Vitals and nursing note reviewed.   Constitutional:       Appearance: She is well-developed.   HENT:      Head: Normocephalic and atraumatic.   Cardiovascular:      Rate and Rhythm: Normal rate and regular rhythm.   Pulmonary:      Effort: Pulmonary effort is normal.   Abdominal:      General: There is no distension.   Musculoskeletal:      Cervical back: Neck supple.      Right knee: Normal.      Left knee: Bony tenderness present. No swelling or  deformity. Decreased range of motion (Able to range through pain.  However painful through range of motion testing). Tenderness present.   Skin:     General: Skin is warm and dry.   Neurological:      Mental Status: She is alert and oriented to person, place, and time.           PROCEDURES     Procedures     DATA     Results       None            Imaging Results              X-RAY KNEE LEFT 4+ VIEWS (Final result)  Result time 08/12/24 13:52:27      Final result                   Impression:    IMPRESSION:  Advanced multicompartment degenerative change of the left knee with likely  multiple calcified loose bodies and/or synovial calcification                 Narrative:    CLINICAL HISTORY: Chronic knee pain    TECHNIQUE: 4 views of left knee    COMPARISON:None    COMMENT:    BONES/ JOINTS: There is narrowing medial compartment. There is osteophytic  spurring involving the medial femoral condyle and medial tibial plateau. There  is spurring of the posterior patella. There are ossific bodies projecting over  the posterior aspect of the knee joint suspicious for ossified loose bodies.  Additionally there is an area of nodular calcification measuring 5 cm x 1.5 cm  composite overlying the suprapatellar joint space which could represent synovial  calcification and/or calcified loose bodies.    SOFT TISSUES: See above                                      No orders to display       Scoring tools                                  ED Course & MDM   MDM / ED COURSE / CLINICAL IMPRESSION / DISPO     Medical Decision Making  Pain chronic, worsening.  Stating that her surgery is scheduled for late October.  The patient offers no new injuries.  Will plan for 8 tablets of oxycodone for breakthrough pain, recommending soft knee, and follow-up with her orthopedics physician.    Problems Addressed:  Acute pain of left knee: acute illness or injury    Amount and/or Complexity of Data Reviewed  Radiology: ordered and independent  interpretation performed.     Details: Agree with radiology interpretation.    Risk  Prescription drug management.           Clinical Impression      Primary osteoarthritis of left knee   Acute pain of left knee     _________________       ED Disposition   Discharge                       Sin Major DO  08/12/24 1443

## 2024-08-16 RX ORDER — TRAMADOL HYDROCHLORIDE 50 MG/1
50 TABLET ORAL EVERY 6 HOURS PRN
Qty: 30 TABLET | Refills: 0 | Status: SHIPPED | OUTPATIENT
Start: 2024-08-16 | End: 2024-08-26

## 2024-09-16 ENCOUNTER — TELEPHONE (OUTPATIENT)
Dept: FAMILY MEDICINE | Facility: CLINIC | Age: 74
End: 2024-09-16

## 2024-09-18 ENCOUNTER — PRE-ADMISSION TESTING (OUTPATIENT)
Dept: PREADMISSION TESTING | Facility: HOSPITAL | Age: 74
End: 2024-09-18
Attending: ORTHOPAEDIC SURGERY
Payer: COMMERCIAL

## 2024-09-18 ENCOUNTER — APPOINTMENT (OUTPATIENT)
Dept: LAB | Facility: HOSPITAL | Age: 74
End: 2024-09-18
Attending: ORTHOPAEDIC SURGERY
Payer: COMMERCIAL

## 2024-09-18 ENCOUNTER — TRANSCRIBE ORDERS (OUTPATIENT)
Dept: LAB | Facility: HOSPITAL | Age: 74
End: 2024-09-18

## 2024-09-18 VITALS
WEIGHT: 204.3 LBS | BODY MASS INDEX: 37.6 KG/M2 | OXYGEN SATURATION: 99 % | RESPIRATION RATE: 20 BRPM | HEART RATE: 80 BPM | HEIGHT: 62 IN | SYSTOLIC BLOOD PRESSURE: 138 MMHG | TEMPERATURE: 97.8 F | DIASTOLIC BLOOD PRESSURE: 70 MMHG

## 2024-09-18 DIAGNOSIS — Z01.818 PREOPERATIVE EXAMINATION: ICD-10-CM

## 2024-09-18 DIAGNOSIS — Z01.818 PREOP EXAMINATION: ICD-10-CM

## 2024-09-18 DIAGNOSIS — M17.12 PRIMARY LOCALIZED OSTEOARTHRITIS OF LEFT KNEE: ICD-10-CM

## 2024-09-18 DIAGNOSIS — Z01.818 PREOP EXAMINATION: Primary | ICD-10-CM

## 2024-09-18 DIAGNOSIS — M17.12 UNILATERAL PRIMARY OSTEOARTHRITIS, LEFT KNEE: Primary | ICD-10-CM

## 2024-09-18 DIAGNOSIS — M17.12 UNILATERAL PRIMARY OSTEOARTHRITIS, LEFT KNEE: ICD-10-CM

## 2024-09-18 DIAGNOSIS — G57.11 MERALGIA PARAESTHETICA, RIGHT: ICD-10-CM

## 2024-09-18 DIAGNOSIS — K21.9 GASTROESOPHAGEAL REFLUX DISEASE WITHOUT ESOPHAGITIS: ICD-10-CM

## 2024-09-18 DIAGNOSIS — E11.9 TYPE 2 DIABETES MELLITUS WITHOUT COMPLICATION, WITHOUT LONG-TERM CURRENT USE OF INSULIN (CMS/HCC): ICD-10-CM

## 2024-09-18 DIAGNOSIS — E78.5 HYPERLIPIDEMIA, UNSPECIFIED HYPERLIPIDEMIA TYPE: ICD-10-CM

## 2024-09-18 DIAGNOSIS — I10 PRIMARY HYPERTENSION: ICD-10-CM

## 2024-09-18 LAB
ABO + RH BLD: NORMAL
ANION GAP SERPL CALC-SCNC: 6 MEQ/L (ref 3–15)
BLD GP AB SCN SERPL QL: NEGATIVE
BLOOD BANK CMNT PATIENT-IMP: NORMAL
BUN SERPL-MCNC: 18 MG/DL (ref 7–25)
CALCIUM SERPL-MCNC: 9.8 MG/DL (ref 8.6–10.3)
CHLORIDE SERPL-SCNC: 106 MEQ/L (ref 98–107)
CO2 SERPL-SCNC: 29 MEQ/L (ref 21–31)
CREAT SERPL-MCNC: 0.8 MG/DL (ref 0.6–1.2)
D AG BLD QL: NEGATIVE
EGFRCR SERPLBLD CKD-EPI 2021: >60 ML/MIN/1.73M*2
ERYTHROCYTE [DISTWIDTH] IN BLOOD BY AUTOMATED COUNT: 14.3 % (ref 11.7–14.4)
EST. AVERAGE GLUCOSE BLD GHB EST-MCNC: 174 MG/DL
GLUCOSE SERPL-MCNC: 119 MG/DL (ref 70–99)
HBA1C MFR BLD: 7.7 %
HCT VFR BLD AUTO: 43.3 % (ref 35–45)
HGB BLD-MCNC: 13.1 G/DL (ref 11.8–15.7)
LABORATORY COMMENT REPORT: NORMAL
MCH RBC QN AUTO: 24.4 PG (ref 28–33.2)
MCHC RBC AUTO-ENTMCNC: 30.3 G/DL (ref 32.2–35.5)
MCV RBC AUTO: 80.6 FL (ref 83–98)
PDW BLD AUTO: 10.1 FL (ref 9.4–12.3)
PLATELET # BLD AUTO: 289 K/UL (ref 150–369)
POTASSIUM SERPL-SCNC: 4.5 MEQ/L (ref 3.5–5.1)
RBC # BLD AUTO: 5.37 M/UL (ref 3.93–5.22)
SODIUM SERPL-SCNC: 141 MEQ/L (ref 136–145)
SPECIMEN EXP DATE BLD: NORMAL
WBC # BLD AUTO: 5.45 K/UL (ref 3.8–10.5)

## 2024-09-18 PROCEDURE — 86900 BLOOD TYPING SEROLOGIC ABO: CPT

## 2024-09-18 PROCEDURE — 80048 BASIC METABOLIC PNL TOTAL CA: CPT

## 2024-09-18 PROCEDURE — 83036 HEMOGLOBIN GLYCOSYLATED A1C: CPT

## 2024-09-18 PROCEDURE — 36415 COLL VENOUS BLD VENIPUNCTURE: CPT

## 2024-09-18 PROCEDURE — 99214 OFFICE O/P EST MOD 30 MIN: CPT | Performed by: HOSPITALIST

## 2024-09-18 PROCEDURE — 85027 COMPLETE CBC AUTOMATED: CPT

## 2024-09-18 RX ORDER — PREGABALIN 75 MG/1
75 CAPSULE ORAL 2 TIMES DAILY
COMMUNITY

## 2024-09-18 NOTE — ASSESSMENT & PLAN NOTE
Medical management and ciro-operative risk commentary as noted below.    Patient saw Dr. Seay for pre-operative cardiac evaluation, and I defer to his recommendations.

## 2024-09-18 NOTE — PRE-PROCEDURE INSTRUCTIONS
1. We will call you between 3 pm and 7 pm on October 8, 2024 to determine that arrival time for your procedure. If you do not hear by 6PM. Please call [phone] for arrival time.     2. Please report to Main Entrance near Parking lot A, walk into main lobby and report to the admission desk on the first floor on the day of your procedure.                3. Please follow the following fasting guidelines:     No solid food for EIGHT HOURS prior to surgery.      4. Please take ONLY the following medications with a sip of water on the morning of your procedure: You may take LYRICA, OMEPRAZOLE, ROSUVASTATIN the morning of the procedure. DO NOT take Jardiance for 3 days prior to the procedure.  Please do not take any Aspirin, herbal supplements or vitamins for 7 days prior to the procedure. DO NOT TAKE Metformin,  and Hyzaar the morning of the procedure.      5. Other Instructions: You may brush your teeth the morning of the procedure. Rinse and spit, do not swallow.  Bring a list of your medications with dosages.  Use surgical wash as directed.     6. If you develop a cold, cough, fever, rash, or other symptom prior to the day of the procedure, please report it to your physician immediately.    7. If you need to cancel the procedure for any reason, please contact your physician.    8. Make arrangements to have safe transportation home accompanied by a responsible adult. If you have not arranged safe transportation home, your surgery will be cancelled. Safe transportation may include private vehicle, ride-share service, taxi and public transportation when accompanied by a responsible adult who will assist you home. A responsible adult is someone known to you and does not include the taxi, ride-share or public transit drive transporting you.    9. You may not take public transportation unless accompanied by a responsible person.    10. You may not drive a car or operate complex or potentially dangerous machinery for 24  hours following anesthesia and/or sedation.    11.  If it is medically necessary for you to have a longer stay, you will be informed as soon as the decision is made.    12. Only bring essential items to the hospital.  Do not wear or bring anything of value to the hospital including jewelry of any kind, money, or wallet. Do not wear make-up or contact lenses.   DO NOT BRING MEDICATIONS FROM HOME unless instructed to do so. DO bring your hearing aids, glasses, and a case    13. No lotion, creams, powders, or oils on skin once you start the surgical wash or Dial soap.        14. Dress in comfortable clothes.    15.  If instructed, please bring a copy of your Advanced Directive (Living Will/Durable Power of ) on the day of your procedure.     16. Ensuring your safety at all times is a very important part of our [facility] Culture of Safety. After having surgery and sedation, you are at risk for falling and balance issues. Although you may feel awake, the effects of the medication can last up to 24 hours after anesthesia. If you need to use the bathroom during your recovery period, nursing staff will escort you there and stay with you to ensure your safety.    17. Refrain from drinking alcohol, Smoking cigarettes or marijuana for 24 hours prior to surgery.    18. Shower with antibacterial soap (Dial) the night before and morning of your procedure.  If your procedure indicates the need for CHG antiseptic wash (Bactoshield or Hibiclens), please use this instead and follow instructions as discussed at the time of your Pre-Admission Testing phone interview or visit.    Above instructions reviewed with patient and patient acknowledges understanding.       [facility]   Patient Education Preoperative Showers     Good Hygiene, such as frequent handwashing and daily skin cleansing, promotes good health.  Daily skin cleansing helps remove germs that may cause infections. The following instructions should be followed to help  reduce germs on your skin prior to your surgical procedure.     Bactoshield/Hibiclens CHG 4% is an antiseptic soap.  The active ingredient is chlorhexidine gluconate. Do not use this product if you are allergic to chlorhexidine gluconate.     The NIGHT before and the MORNING of your procedure , shower or bathe with Bactoshield. This product should replace your regular soap used for cleansing most of your body surfaces. Bactoshield should not be used on your head or face: keep out of your eyes, ears, and mouth.      If you plan to wash your hair, do so with your regular shampoo. Then, rinse the hair and your body thoroughly to move any shampoo residue.     Wash face with regular soap and water only.     Wash your genital area with soap and water only.    Thoroughly rinse your body with warm water from the neck down.       Apply the minimum amount of Bactoshield to cover the skin. Use this product as you would any liquid soap. Leave this on for 2 minutes. Note- Bactoshield DOES NOT LATHER like normal soap.      Wash the skin gently and rinse thoroughly with warm water. You do not need to scrub the skin to remove germs.      Do not use regular soap after you have applied and rinsed the Bactoshield.  Change into clean clothes after each shower.     Do not apply any lotion, powder, or perfumes to the body areas that have been cleansed with Bactoshield.     No use of hair removal products or shaving at or near the surgical site 48 hours before your procedure. (72 hours for cardiac patients.)    For those having perineal area surgeries (ie: vaginal, rectal or cystoscopy) - please use Dial soap.          Why do we cleanse the skin prior to surgery?   There are lots of germs on our skin and in our environment. Most are harmless, some are even helpful on our skin and in our environment. As part of your preparation you will be asked to purchase a bottle of antibacterial soap, Bactoshield CHG 4% or Hibiclens CHG4% to cleanse  your skin and eliminate a certain bacteria called, Staphylococcus Aureus.      What is Staphylococcus aureus? (Staph)   Staph is a common germ found on the skin. One-third of healthy people carry this germ. Methicillin-resistant Staphylococcus aureus (MRSA) is a type of Staph bacteria that is resistant to certain antibiotics. In the community, most MRSA infections are skin infections.  People who have Staph/MRSA may show no signs of illness- this is called colonization. Caution needs to be used when you come into the hospital for a surgical procedure to ensure that Staph does not enter your body.     Am I at increased risk for infection if I am carrying Staph ?   Because Staph is carried on your skin, you may be at increased risk for developing a surgical infection. To reduce the presence of Staph on your skin, we recommend a series of preoperative showers with an antibacterial skin cleanser. These showers are to be done the NIGHT BEFORE your surgery and the MORNING of your surgery.

## 2024-09-18 NOTE — CONSULTS
[facility] Medicine Service -  Pre-Operative Consultation       Patient Name: Fanny Reddy  Referring Surgeon: Kit Chan DO    Reason for Referral: Pre-Operative Evaluation  Surgical Procedure: Left Knee Arthroplasty  Operative Date: 10/9/24  Other Providers:      PCP: Lupe Mendez DO   Cardiology: Simone Seay DO       HISTORY OF PRESENT ILLNESS      Fanny Reddy is a 74 y.o. female presenting today to the [facility] at [facility] for pre-operative evaluation prior to planned surgery.    Patient reports several years of progressive left knee pain. Pain is described as severe, and she also reports instability in the joint. She feels the knee buckling underneath of her and reports poor balance. She has been using a wheelchair and cane at home, and now rarely leaves her apartment due to difficulty ambulating. She has pain with walking, standing, getting up from a seated position, and reports night time pain that disrupts her sleep. She has pain and osteoarthritis in the right knee as well, but less bothersome than the left. She has tried injections, both steroid and viscosupplement, which have ceased to be of benefit. She would like to proceed with joint replacement.     Patient saw Dr. Seay for pre-operative cardiac risk assessment on 8/19/24 and was felt to be low cardiac risk, without need for additional testing before surgery.     In regards to medical history:  - Type 2 Diabetes, medically managed with jardiance and metformin, last A1C in June 7.6. She tells me she has been eating a lot of comfort food lately and has gained some weight. Fasting glucose in the 130-146 range.   - Primary Hypertension, medically managed with losartan hctz  - Hypercholesterolemia, medically managed with rosuvastatin, recent , HDL 64  - Gerd, for which she takes omeprazole  - Meralgia paresthetica right thigh, for which she  takes lyrica.  - IBS-d, managed by diet.     The patient denies any current or recent chest pain or pressure, dyspnea, cough, sputum, fevers, chills, abdominal pain, nausea, vomiting, diarrhea or other symptoms.   Denies a personal or family history of adverse reactions to anesthesia.   Denies a personal or family history of easy/excessive bleeding or thrombosis.  Functionally, the patient is quite limited due to her knee pain.    The patient denies, on specific questioning, the following:  No history of MI, arrhythmia,or CHF.  No history of MONICA- STOP-Bang of 3  No history of DVT/PE.  No history of COPD.  No history of CVA.  No history of CKD.     PAST MEDICAL AND SURGICAL HISTORY      Past Medical History:   Diagnosis Date    Cutaneous abscess of neck 02/07/2019    GERD (gastroesophageal reflux disease)     Hypertension     Irritable bowel syndrome with diarrhea     Lipid disorder     Meralgia paraesthetica, right     Seasonal allergies     Type 2 diabetes mellitus (CMS/HCC)        Past Surgical History   Procedure Laterality Date    Hysterectomy      Tonsillectomy      UPPER GASTROINTESTINAL ENDOSCOPY WITH BIOPSY N/A 4/10/2024    Performed by Nathan Willams MD at [facility]       MEDICATIONS        Current Outpatient Medications:     pregabalin (LYRICA) 75 mg capsule, Take 75 mg by mouth 2 (two) times a day., Disp: , Rfl:     aspirin (ASPIR-LOW) 81 mg enteric coated tablet, Take 1 tablet by mouth daily., Disp: , Rfl:     blood sugar diagnostic (CONTOUR NEXT TEST STRIPS) strip, Test twice daily, Disp: 200 strip, Rfl: 3    empagliflozin (JARDIANCE) 10 mg tablet, Take 1 tablet (10 mg total) by mouth daily., Disp: 90 tablet, Rfl: 1    fluticasone propionate (FLONASE) 50 mcg/actuation nasal spray, Administer 1 spray into each nostril daily., Disp: 16 g, Rfl: 5    JARDIANCE 10 mg tablet, TAKE 1 TABLET BY MOUTH EVERY DAY, Disp: 90 tablet, Rfl: 1    losartan-hydrochlorothiazide (HYZAAR) 50-12.5 mg per tablet, TAKE 1  TABLET BY MOUTH EVERY DAY, Disp: 90 tablet, Rfl: 1    losartan-hydrochlorothiazide (HYZAAR) 50-12.5 mg per tablet, Take 1 tablet by mouth daily., Disp: 90 tablet, Rfl: 1    metFORMIN (GLUCOPHAGE) 1,000 mg tablet, Take 1 tablet (1,000 mg total) by mouth 2 (two) times a day with meals., Disp: 180 tablet, Rfl: 1    omeprazole (PriLOSEC) 40 mg capsule, TAKE 1 CAPSULE (40 MG TOTAL) BY MOUTH DAILY., Disp: 90 capsule, Rfl: 1    omeprazole (PriLOSEC) 40 mg capsule, Take 1 capsule (40 mg total) by mouth daily., Disp: 90 capsule, Rfl: 1    oxyCODONE (ROXICODONE) 5 mg immediate release tablet, Take 1 tablet (5 mg total) by mouth every 6 (six) hours as needed for moderate pain. Initial treatment. Do not drive / operate machinery., Disp: 8 tablet, Rfl: 0    rosuvastatin (CRESTOR) 20 mg tablet, Take 1 tablet (20 mg total) by mouth daily., Disp: 90 tablet, Rfl: 0    rosuvastatin (CRESTOR) 20 mg tablet, Take 1 tablet (20 mg total) by mouth daily., Disp: 90 tablet, Rfl: 0    traZODone (DESYREL) 50 mg tablet, TAKE 1 SPLIT TABLET (25 MG TOTAL) BY MOUTH NIGHTLY., Disp: 90 tablet, Rfl: 1    valACYclovir (VALTREX) 500 mg tablet, Take 1 tablet (500 mg total) by mouth daily., Disp: 90 tablet, Rfl: 1    ALLERGIES      Patient has no known allergies.    FAMILY HISTORY      family history includes Emphysema in her biological brother; Heart disease in her biological father and biological mother.    Denies any prior known family history of DVTs/PEs/clotting disorder    SOCIAL HISTORY      Social History     Tobacco Use    Smoking status: Never    Smokeless tobacco: Never   Substance Use Topics    Alcohol use: Yes     Comment: socially at celebratory events    Drug use: No     Lives alone in apartment. Nephew lives nearby. Sisters are going to come and help her after surgery.     REVIEW OF SYSTEMS      Constitutional: Denies Fever, Chills, Fatigue, Malaise, Unintentional Weight loss; + weight gain  HEENT: Denies Vision changes, Epistaxis,  "Trouble Swallowing, Sore Throat  Respiratory: Denies Cough, Shortness of Breath, Wheezing  Cardiovascular: Denies Chest Pain, Exertional Dyspnea, Palpitations, Edema  GI: Denies Abdominal pain, Nausea, Vomiting, Changes in bowel movements, Blood in stool   : Denies Dysuria, Hematuria  Musculoskeletal: Denies Back pain, Neck pain, + bilateral knee pain L>R  Skin: Denies rash  Neuro: Denies Headache, Syncope, Weakness, + numbness and tingling right thigh/leg  Heme: Denies Bleeding      PHYSICAL EXAMINATION      Visit Vitals  /70 (BP Location: Right upper arm, Patient Position: Sitting)   Pulse 80   Temp 36.6 °C (97.8 °F) (Temporal)   Resp 20   Ht 1.575 m (5' 2\")   Wt 92.7 kg (204 lb 4.8 oz)   SpO2 99%   BMI 37.37 kg/m²     Body mass index is 37.37 kg/m².    Physical Exam  Constitutional: Well developed, obese, No apparent distress, Appears Comfortable  Neck: supple, no LAD  Cardiovascular: Normal rate, Regular Rhythm, Normal heart sounds, No murmur noted, No carotid bruits  Pulmonary: Normal effort without distress, Normal breath sounds without wheezing, rhonchi, or rales  Abdomen: Soft, no distension, nontender, normal bowel sounds  Extremities: No edema  Neurologic: No gross abnormalities, patient able to ambulate around exam room independently  Skin: Warm, No rash  Psychiatric: Normal mood and affect      LABS / EKG        Labs  Lab Results   Component Value Date     09/18/2024    K 4.5 09/18/2024     09/18/2024    BUN 18 09/18/2024    CREATININE 0.8 09/18/2024    WBC 5.45 09/18/2024    HGB 13.1 09/18/2024    HCT 43.3 09/18/2024     09/18/2024    ALT 25 06/04/2024    AST 24 06/04/2024    INR 1.0 02/18/2020    HGBA1C 7.7 (H) 09/18/2024     ECG/Telemetry 8/19/24  Normal Sinus Rhythm, Lateral TWI, Prominent R wave in V1/V2, possible lead switch of V1 and V2    ASSESSMENT AND PLAN         Preoperative examination  Medical management and ciro-operative risk commentary as noted " below.    Patient saw Dr. Seay for pre-operative cardiac evaluation, and I defer to his recommendations.    Primary localized osteoarthritis of left knee  Surgery as planned. She will hold her aspirin for 1 week prior to surgery.     Primary hypertension  Blood pressure acceptable for upcoming surgery. She will hold the losartan hct on the morning of surgery, and this may be resumed post-op assuming no renal dysfunction, hypotension, nor volume depletion.     Type 2 diabetes mellitus (CMS/[facility])  Discussed and recommended improved dietary compliance and she verbalized agreement. Patient will hold jardiance for 3 days prior to surgery, per anesthesia protocol. She will hold metformin on the morning of surgery. Resume oral diabetic medical regimen once tolerating PO and no renal dysfunction and planning no additional studies. Meal coverage insulin; follow glucose values. If glucose POCs remain > 180 then may need transient medical management adjustments to treat stress hyperglycemia.      Hyperlipidemia  Continue rosuvastatin uninterrupted.     Meralgia paraesthetica, right  Patient may continue lyrica.     GERD (gastroesophageal reflux disease)  Continue omeprazole uninterrupted.        In regards to perioperative cardiac risk:  Defer preoperative cardiovascular risk assessment and recommendations for further testing to the patient's cardiologist.      Further comments:  Resume supplements when OK with surgical team.  I would encourage incentive spirometry to assist with minimizing ciro-operative pulmonary risk.  DVT prophylaxis and timing of such per the discretion of Dr. Chan.     Please do not hesitate to contact [facility] during the upcoming hospitalization with any questions or concerns.     Jennifer Castellano MD  9/18/2024

## 2024-09-18 NOTE — ASSESSMENT & PLAN NOTE
Discussed and recommended improved dietary compliance and she verbalized agreement. Patient will hold jardiance for 3 days prior to surgery, per anesthesia protocol. She will hold metformin on the morning of surgery. Resume oral diabetic medical regimen once tolerating PO and no renal dysfunction and planning no additional studies. Meal coverage insulin; follow glucose values. If glucose POCs remain > 180 then may need transient medical management adjustments to treat stress hyperglycemia.

## 2024-09-18 NOTE — H&P (VIEW-ONLY)
[facility] Medicine Service -  Pre-Operative Consultation       Patient Name: Fanny Reddy  Referring Surgeon: Kit Chan DO    Reason for Referral: Pre-Operative Evaluation  Surgical Procedure: Left Knee Arthroplasty  Operative Date: 10/9/24  Other Providers:      PCP: Lupe Mendez DO   Cardiology: Simone Seay DO       HISTORY OF PRESENT ILLNESS      Fanny Reddy is a 74 y.o. female presenting today to the [facility] at [facility] for pre-operative evaluation prior to planned surgery.    Patient reports several years of progressive left knee pain. Pain is described as severe, and she also reports instability in the joint. She feels the knee buckling underneath of her and reports poor balance. She has been using a wheelchair and cane at home, and now rarely leaves her apartment due to difficulty ambulating. She has pain with walking, standing, getting up from a seated position, and reports night time pain that disrupts her sleep. She has pain and osteoarthritis in the right knee as well, but less bothersome than the left. She has tried injections, both steroid and viscosupplement, which have ceased to be of benefit. She would like to proceed with joint replacement.     Patient saw Dr. Seay for pre-operative cardiac risk assessment on 8/19/24 and was felt to be low cardiac risk, without need for additional testing before surgery.     In regards to medical history:  - Type 2 Diabetes, medically managed with jardiance and metformin, last A1C in June 7.6. She tells me she has been eating a lot of comfort food lately and has gained some weight. Fasting glucose in the 130-146 range.   - Primary Hypertension, medically managed with losartan hctz  - Hypercholesterolemia, medically managed with rosuvastatin, recent , HDL 64  - Gerd, for which she takes omeprazole  - Meralgia paresthetica right thigh, for which she  takes lyrica.  - IBS-d, managed by diet.     The patient denies any current or recent chest pain or pressure, dyspnea, cough, sputum, fevers, chills, abdominal pain, nausea, vomiting, diarrhea or other symptoms.   Denies a personal or family history of adverse reactions to anesthesia.   Denies a personal or family history of easy/excessive bleeding or thrombosis.  Functionally, the patient is quite limited due to her knee pain.    The patient denies, on specific questioning, the following:  No history of MI, arrhythmia,or CHF.  No history of MONICA- STOP-Bang of 3  No history of DVT/PE.  No history of COPD.  No history of CVA.  No history of CKD.     PAST MEDICAL AND SURGICAL HISTORY      Past Medical History:   Diagnosis Date    Cutaneous abscess of neck 02/07/2019    GERD (gastroesophageal reflux disease)     Hypertension     Irritable bowel syndrome with diarrhea     Lipid disorder     Meralgia paraesthetica, right     Seasonal allergies     Type 2 diabetes mellitus (CMS/HCC)        Past Surgical History   Procedure Laterality Date    Hysterectomy      Tonsillectomy      UPPER GASTROINTESTINAL ENDOSCOPY WITH BIOPSY N/A 4/10/2024    Performed by Nathan Willams MD at [facility]       MEDICATIONS        Current Outpatient Medications:     pregabalin (LYRICA) 75 mg capsule, Take 75 mg by mouth 2 (two) times a day., Disp: , Rfl:     aspirin (ASPIR-LOW) 81 mg enteric coated tablet, Take 1 tablet by mouth daily., Disp: , Rfl:     blood sugar diagnostic (CONTOUR NEXT TEST STRIPS) strip, Test twice daily, Disp: 200 strip, Rfl: 3    empagliflozin (JARDIANCE) 10 mg tablet, Take 1 tablet (10 mg total) by mouth daily., Disp: 90 tablet, Rfl: 1    fluticasone propionate (FLONASE) 50 mcg/actuation nasal spray, Administer 1 spray into each nostril daily., Disp: 16 g, Rfl: 5    JARDIANCE 10 mg tablet, TAKE 1 TABLET BY MOUTH EVERY DAY, Disp: 90 tablet, Rfl: 1    losartan-hydrochlorothiazide (HYZAAR) 50-12.5 mg per tablet, TAKE 1  TABLET BY MOUTH EVERY DAY, Disp: 90 tablet, Rfl: 1    losartan-hydrochlorothiazide (HYZAAR) 50-12.5 mg per tablet, Take 1 tablet by mouth daily., Disp: 90 tablet, Rfl: 1    metFORMIN (GLUCOPHAGE) 1,000 mg tablet, Take 1 tablet (1,000 mg total) by mouth 2 (two) times a day with meals., Disp: 180 tablet, Rfl: 1    omeprazole (PriLOSEC) 40 mg capsule, TAKE 1 CAPSULE (40 MG TOTAL) BY MOUTH DAILY., Disp: 90 capsule, Rfl: 1    omeprazole (PriLOSEC) 40 mg capsule, Take 1 capsule (40 mg total) by mouth daily., Disp: 90 capsule, Rfl: 1    oxyCODONE (ROXICODONE) 5 mg immediate release tablet, Take 1 tablet (5 mg total) by mouth every 6 (six) hours as needed for moderate pain. Initial treatment. Do not drive / operate machinery., Disp: 8 tablet, Rfl: 0    rosuvastatin (CRESTOR) 20 mg tablet, Take 1 tablet (20 mg total) by mouth daily., Disp: 90 tablet, Rfl: 0    rosuvastatin (CRESTOR) 20 mg tablet, Take 1 tablet (20 mg total) by mouth daily., Disp: 90 tablet, Rfl: 0    traZODone (DESYREL) 50 mg tablet, TAKE 1 SPLIT TABLET (25 MG TOTAL) BY MOUTH NIGHTLY., Disp: 90 tablet, Rfl: 1    valACYclovir (VALTREX) 500 mg tablet, Take 1 tablet (500 mg total) by mouth daily., Disp: 90 tablet, Rfl: 1    ALLERGIES      Patient has no known allergies.    FAMILY HISTORY      family history includes Emphysema in her biological brother; Heart disease in her biological father and biological mother.    Denies any prior known family history of DVTs/PEs/clotting disorder    SOCIAL HISTORY      Social History     Tobacco Use    Smoking status: Never    Smokeless tobacco: Never   Substance Use Topics    Alcohol use: Yes     Comment: socially at celebratory events    Drug use: No     Lives alone in apartment. Nephew lives nearby. Sisters are going to come and help her after surgery.     REVIEW OF SYSTEMS      Constitutional: Denies Fever, Chills, Fatigue, Malaise, Unintentional Weight loss; + weight gain  HEENT: Denies Vision changes, Epistaxis,  "Trouble Swallowing, Sore Throat  Respiratory: Denies Cough, Shortness of Breath, Wheezing  Cardiovascular: Denies Chest Pain, Exertional Dyspnea, Palpitations, Edema  GI: Denies Abdominal pain, Nausea, Vomiting, Changes in bowel movements, Blood in stool   : Denies Dysuria, Hematuria  Musculoskeletal: Denies Back pain, Neck pain, + bilateral knee pain L>R  Skin: Denies rash  Neuro: Denies Headache, Syncope, Weakness, + numbness and tingling right thigh/leg  Heme: Denies Bleeding      PHYSICAL EXAMINATION      Visit Vitals  /70 (BP Location: Right upper arm, Patient Position: Sitting)   Pulse 80   Temp 36.6 °C (97.8 °F) (Temporal)   Resp 20   Ht 1.575 m (5' 2\")   Wt 92.7 kg (204 lb 4.8 oz)   SpO2 99%   BMI 37.37 kg/m²     Body mass index is 37.37 kg/m².    Physical Exam  Constitutional: Well developed, obese, No apparent distress, Appears Comfortable  Neck: supple, no LAD  Cardiovascular: Normal rate, Regular Rhythm, Normal heart sounds, No murmur noted, No carotid bruits  Pulmonary: Normal effort without distress, Normal breath sounds without wheezing, rhonchi, or rales  Abdomen: Soft, no distension, nontender, normal bowel sounds  Extremities: No edema  Neurologic: No gross abnormalities, patient able to ambulate around exam room independently  Skin: Warm, No rash  Psychiatric: Normal mood and affect      LABS / EKG        Labs  Lab Results   Component Value Date     09/18/2024    K 4.5 09/18/2024     09/18/2024    BUN 18 09/18/2024    CREATININE 0.8 09/18/2024    WBC 5.45 09/18/2024    HGB 13.1 09/18/2024    HCT 43.3 09/18/2024     09/18/2024    ALT 25 06/04/2024    AST 24 06/04/2024    INR 1.0 02/18/2020    HGBA1C 7.7 (H) 09/18/2024     ECG/Telemetry 8/19/24  Normal Sinus Rhythm, Lateral TWI, Prominent R wave in V1/V2, possible lead switch of V1 and V2    ASSESSMENT AND PLAN         Preoperative examination  Medical management and ciro-operative risk commentary as noted " below.    Patient saw Dr. Seay for pre-operative cardiac evaluation, and I defer to his recommendations.    Primary localized osteoarthritis of left knee  Surgery as planned. She will hold her aspirin for 1 week prior to surgery.     Primary hypertension  Blood pressure acceptable for upcoming surgery. She will hold the losartan hct on the morning of surgery, and this may be resumed post-op assuming no renal dysfunction, hypotension, nor volume depletion.     Type 2 diabetes mellitus (CMS/[facility])  Discussed and recommended improved dietary compliance and she verbalized agreement. Patient will hold jardiance for 3 days prior to surgery, per anesthesia protocol. She will hold metformin on the morning of surgery. Resume oral diabetic medical regimen once tolerating PO and no renal dysfunction and planning no additional studies. Meal coverage insulin; follow glucose values. If glucose POCs remain > 180 then may need transient medical management adjustments to treat stress hyperglycemia.      Hyperlipidemia  Continue rosuvastatin uninterrupted.     Meralgia paraesthetica, right  Patient may continue lyrica.     GERD (gastroesophageal reflux disease)  Continue omeprazole uninterrupted.        In regards to perioperative cardiac risk:  Defer preoperative cardiovascular risk assessment and recommendations for further testing to the patient's cardiologist.      Further comments:  Resume supplements when OK with surgical team.  I would encourage incentive spirometry to assist with minimizing ciro-operative pulmonary risk.  DVT prophylaxis and timing of such per the discretion of Dr. Chan.     Please do not hesitate to contact [facility] during the upcoming hospitalization with any questions or concerns.     Jennifer Castellano MD  9/18/2024

## 2024-09-18 NOTE — ASSESSMENT & PLAN NOTE
Blood pressure acceptable for upcoming surgery. She will hold the losartan hct on the morning of surgery, and this may be resumed post-op assuming no renal dysfunction, hypotension, nor volume depletion.

## 2024-10-09 ENCOUNTER — ANESTHESIA EVENT (OUTPATIENT)
Dept: OPERATING ROOM | Facility: HOSPITAL | Age: 74
End: 2024-10-09
Payer: COMMERCIAL

## 2024-10-09 ENCOUNTER — HOSPITAL ENCOUNTER (OUTPATIENT)
Facility: HOSPITAL | Age: 74
Discharge: HOME HEALTH | End: 2024-10-11
Attending: ORTHOPAEDIC SURGERY | Admitting: ORTHOPAEDIC SURGERY
Payer: COMMERCIAL

## 2024-10-09 ENCOUNTER — ANESTHESIA (OUTPATIENT)
Dept: OPERATING ROOM | Facility: HOSPITAL | Age: 74
End: 2024-10-09
Payer: COMMERCIAL

## 2024-10-09 ENCOUNTER — APPOINTMENT (OUTPATIENT)
Dept: RADIOLOGY | Facility: HOSPITAL | Age: 74
End: 2024-10-09
Attending: PHYSICIAN ASSISTANT
Payer: COMMERCIAL

## 2024-10-09 DIAGNOSIS — Z96.652 STATUS POST TOTAL LEFT KNEE REPLACEMENT: Primary | ICD-10-CM

## 2024-10-09 DIAGNOSIS — M17.12 PRIMARY LOCALIZED OSTEOARTHRITIS OF LEFT KNEE: ICD-10-CM

## 2024-10-09 LAB
GLUCOSE BLD-MCNC: 155 MG/DL (ref 70–99)
GLUCOSE BLD-MCNC: 177 MG/DL (ref 70–99)
GLUCOSE BLD-MCNC: 194 MG/DL (ref 70–99)
GLUCOSE BLD-MCNC: 203 MG/DL (ref 70–99)
POCT TEST: ABNORMAL

## 2024-10-09 PROCEDURE — 25000000 HC PHARMACY GENERAL: Performed by: PHYSICIAN ASSISTANT

## 2024-10-09 PROCEDURE — 25000000 HC PHARMACY GENERAL: Performed by: ANESTHESIOLOGY

## 2024-10-09 PROCEDURE — 36000016 HC OR LEVEL 6 EA ADDL MIN: Performed by: ORTHOPAEDIC SURGERY

## 2024-10-09 PROCEDURE — 27200000 HC STERILE SUPPLY: Performed by: ORTHOPAEDIC SURGERY

## 2024-10-09 PROCEDURE — 63600000 HC DRUGS/DETAIL CODE: Mod: JZ | Performed by: PHYSICIAN ASSISTANT

## 2024-10-09 PROCEDURE — 63700000 HC SELF-ADMINISTRABLE DRUG: Performed by: PHYSICIAN ASSISTANT

## 2024-10-09 PROCEDURE — C1713 ANCHOR/SCREW BN/BN,TIS/BN: HCPCS | Performed by: ORTHOPAEDIC SURGERY

## 2024-10-09 PROCEDURE — 63600000 HC DRUGS/DETAIL CODE: Performed by: PHYSICIAN ASSISTANT

## 2024-10-09 PROCEDURE — 25800000 HC PHARMACY IV SOLUTIONS: Performed by: PHYSICIAN ASSISTANT

## 2024-10-09 PROCEDURE — 37000002 HC ANESTHESIA MAC: Performed by: ORTHOPAEDIC SURGERY

## 2024-10-09 PROCEDURE — 63600000 HC DRUGS/DETAIL CODE: Mod: JZ | Performed by: ANESTHESIOLOGY

## 2024-10-09 PROCEDURE — 25800000 HC PHARMACY IV SOLUTIONS: Performed by: ANESTHESIOLOGY

## 2024-10-09 PROCEDURE — 36000006 HC OR LEVEL 6 INITIAL 30MIN: Performed by: ORTHOPAEDIC SURGERY

## 2024-10-09 PROCEDURE — C1776 JOINT DEVICE (IMPLANTABLE): HCPCS | Performed by: ORTHOPAEDIC SURGERY

## 2024-10-09 PROCEDURE — 25000000 HC PHARMACY GENERAL: Performed by: ORTHOPAEDIC SURGERY

## 2024-10-09 PROCEDURE — 71000001 HC PACU PHASE 1 INITIAL 30MIN: Performed by: ORTHOPAEDIC SURGERY

## 2024-10-09 PROCEDURE — 73560 X-RAY EXAM OF KNEE 1 OR 2: CPT | Mod: LT

## 2024-10-09 PROCEDURE — 0SRD0J9 REPLACEMENT OF LEFT KNEE JOINT WITH SYNTHETIC SUBSTITUTE, CEMENTED, OPEN APPROACH: ICD-10-PCS | Performed by: ORTHOPAEDIC SURGERY

## 2024-10-09 PROCEDURE — 71000011 HC PACU PHASE 1 EA ADDL MIN: Performed by: ORTHOPAEDIC SURGERY

## 2024-10-09 DEVICE — CEMENT BONE PALACOS RADIOPAQUE: Type: IMPLANTABLE DEVICE | Site: KNEE | Status: FUNCTIONAL

## 2024-10-09 DEVICE — COMPONENT TIBIAL STEM PERSONA: Type: IMPLANTABLE DEVICE | Site: KNEE | Status: FUNCTIONAL

## 2024-10-09 DEVICE — IMPLANTABLE DEVICE: Type: IMPLANTABLE DEVICE | Site: KNEE | Status: FUNCTIONAL

## 2024-10-09 DEVICE — COMPONENT PATELLAR                                      ALL: Type: IMPLANTABLE DEVICE | Site: KNEE | Status: FUNCTIONAL

## 2024-10-09 RX ORDER — DEXAMETHASONE SODIUM PHOSPHATE 4 MG/ML
INJECTION, SOLUTION INTRA-ARTICULAR; INTRALESIONAL; INTRAMUSCULAR; INTRAVENOUS; SOFT TISSUE AS NEEDED
Status: DISCONTINUED | OUTPATIENT
Start: 2024-10-09 | End: 2024-10-09 | Stop reason: SURG

## 2024-10-09 RX ORDER — FLUTICASONE PROPIONATE 50 MCG
1 SPRAY, SUSPENSION (ML) NASAL DAILY
Status: CANCELLED | OUTPATIENT
Start: 2024-10-09

## 2024-10-09 RX ORDER — PANTOPRAZOLE SODIUM 40 MG/1
40 TABLET, DELAYED RELEASE ORAL
Status: CANCELLED | OUTPATIENT
Start: 2024-10-10 | End: 2025-01-08

## 2024-10-09 RX ORDER — HYDROMORPHONE HYDROCHLORIDE 1 MG/ML
0.5 INJECTION, SOLUTION INTRAMUSCULAR; INTRAVENOUS; SUBCUTANEOUS
Status: DISCONTINUED | OUTPATIENT
Start: 2024-10-09 | End: 2024-10-11 | Stop reason: HOSPADM

## 2024-10-09 RX ORDER — DIPHENHYDRAMINE HCL 50 MG/ML
25 VIAL (ML) INJECTION EVERY 6 HOURS PRN
Status: CANCELLED | OUTPATIENT
Start: 2024-10-09 | End: 2025-01-07

## 2024-10-09 RX ORDER — ONDANSETRON HYDROCHLORIDE 2 MG/ML
4 INJECTION, SOLUTION INTRAVENOUS EVERY 8 HOURS PRN
Status: CANCELLED | OUTPATIENT
Start: 2024-10-09

## 2024-10-09 RX ORDER — SODIUM CHLORIDE, SODIUM LACTATE, POTASSIUM CHLORIDE, CALCIUM CHLORIDE 600; 310; 30; 20 MG/100ML; MG/100ML; MG/100ML; MG/100ML
INJECTION, SOLUTION INTRAVENOUS CONTINUOUS
Status: ACTIVE | OUTPATIENT
Start: 2024-10-09 | End: 2024-10-09

## 2024-10-09 RX ORDER — HYDROMORPHONE HYDROCHLORIDE 1 MG/ML
0.5 INJECTION, SOLUTION INTRAMUSCULAR; INTRAVENOUS; SUBCUTANEOUS
Status: DISCONTINUED | OUTPATIENT
Start: 2024-10-09 | End: 2024-10-09 | Stop reason: HOSPADM

## 2024-10-09 RX ORDER — ACETAMINOPHEN 325 MG/1
975 TABLET ORAL ONCE
Status: COMPLETED | OUTPATIENT
Start: 2024-10-09 | End: 2024-10-09

## 2024-10-09 RX ORDER — FLUTICASONE PROPIONATE 50 MCG
1 SPRAY, SUSPENSION (ML) NASAL DAILY
Status: DISCONTINUED | OUTPATIENT
Start: 2024-10-09 | End: 2024-10-11 | Stop reason: HOSPADM

## 2024-10-09 RX ORDER — TRAMADOL HYDROCHLORIDE 50 MG/1
50 TABLET ORAL EVERY 6 HOURS PRN
Status: CANCELLED | OUTPATIENT
Start: 2024-10-09 | End: 2024-10-23

## 2024-10-09 RX ORDER — ROCURONIUM BROMIDE 50 MG/5 ML
SYRINGE (ML) INTRAVENOUS AS NEEDED
Status: DISCONTINUED | OUTPATIENT
Start: 2024-10-09 | End: 2024-10-09 | Stop reason: SURG

## 2024-10-09 RX ORDER — ONDANSETRON 4 MG/1
4 TABLET, ORALLY DISINTEGRATING ORAL EVERY 8 HOURS PRN
Status: CANCELLED | OUTPATIENT
Start: 2024-10-09

## 2024-10-09 RX ORDER — PREGABALIN 75 MG/1
75 CAPSULE ORAL 2 TIMES DAILY
Status: CANCELLED | OUTPATIENT
Start: 2024-10-09

## 2024-10-09 RX ORDER — KETOROLAC TROMETHAMINE 15 MG/ML
7.5 INJECTION, SOLUTION INTRAMUSCULAR; INTRAVENOUS
Status: CANCELLED | OUTPATIENT
Start: 2024-10-09 | End: 2024-10-11

## 2024-10-09 RX ORDER — DIPHENHYDRAMINE HCL 25 MG
25 CAPSULE ORAL EVERY 6 HOURS PRN
Status: CANCELLED | OUTPATIENT
Start: 2024-10-09 | End: 2025-01-07

## 2024-10-09 RX ORDER — PREGABALIN 75 MG/1
75 CAPSULE ORAL 2 TIMES DAILY
Status: DISCONTINUED | OUTPATIENT
Start: 2024-10-09 | End: 2024-10-10

## 2024-10-09 RX ORDER — DEXTROSE 50 % IN WATER (D50W) INTRAVENOUS SYRINGE
25 AS NEEDED
Status: DISCONTINUED | OUTPATIENT
Start: 2024-10-09 | End: 2024-10-09 | Stop reason: HOSPADM

## 2024-10-09 RX ORDER — KETOROLAC TROMETHAMINE 15 MG/ML
7.5 INJECTION, SOLUTION INTRAMUSCULAR; INTRAVENOUS
Status: COMPLETED | OUTPATIENT
Start: 2024-10-09 | End: 2024-10-11

## 2024-10-09 RX ORDER — PROPOFOL 200MG/20ML
SYRINGE (ML) INTRAVENOUS
Status: COMPLETED | OUTPATIENT
Start: 2024-10-09 | End: 2024-10-09

## 2024-10-09 RX ORDER — TRANEXAMIC ACID 10 MG/ML
1000 INJECTION, SOLUTION INTRAVENOUS ONCE
Status: DISCONTINUED | OUTPATIENT
Start: 2024-10-09 | End: 2024-10-09 | Stop reason: HOSPADM

## 2024-10-09 RX ORDER — BISACODYL 10 MG/1
10 SUPPOSITORY RECTAL DAILY PRN
Status: CANCELLED | OUTPATIENT
Start: 2024-10-09 | End: 2025-01-07

## 2024-10-09 RX ORDER — VALACYCLOVIR HYDROCHLORIDE 500 MG/1
500 TABLET, FILM COATED ORAL DAILY
Status: CANCELLED | OUTPATIENT
Start: 2024-10-09

## 2024-10-09 RX ORDER — SODIUM CHLORIDE 9 MG/ML
INJECTION, SOLUTION INTRAVENOUS CONTINUOUS PRN
Status: DISCONTINUED | OUTPATIENT
Start: 2024-10-09 | End: 2024-10-09 | Stop reason: SURG

## 2024-10-09 RX ORDER — FENTANYL CITRATE 50 UG/ML
INJECTION, SOLUTION INTRAMUSCULAR; INTRAVENOUS AS NEEDED
Status: DISCONTINUED | OUTPATIENT
Start: 2024-10-09 | End: 2024-10-09 | Stop reason: SURG

## 2024-10-09 RX ORDER — VANCOMYCIN/0.9 % SOD CHLORIDE 1.5G/250ML
15 PLASTIC BAG, INJECTION (ML) INTRAVENOUS
Status: COMPLETED | OUTPATIENT
Start: 2024-10-09 | End: 2024-10-09

## 2024-10-09 RX ORDER — ROSUVASTATIN CALCIUM 20 MG/1
20 TABLET, COATED ORAL DAILY
Status: CANCELLED | OUTPATIENT
Start: 2024-10-09

## 2024-10-09 RX ORDER — HYDROMORPHONE HYDROCHLORIDE 1 MG/ML
INJECTION, SOLUTION INTRAMUSCULAR; INTRAVENOUS; SUBCUTANEOUS AS NEEDED
Status: DISCONTINUED | OUTPATIENT
Start: 2024-10-09 | End: 2024-10-09 | Stop reason: SURG

## 2024-10-09 RX ORDER — ROSUVASTATIN CALCIUM 20 MG/1
20 TABLET, COATED ORAL DAILY
Status: DISCONTINUED | OUTPATIENT
Start: 2024-10-09 | End: 2024-10-11 | Stop reason: HOSPADM

## 2024-10-09 RX ORDER — DIPHENHYDRAMINE HCL 25 MG
25 CAPSULE ORAL EVERY 6 HOURS PRN
Status: DISCONTINUED | OUTPATIENT
Start: 2024-10-09 | End: 2024-10-11 | Stop reason: HOSPADM

## 2024-10-09 RX ORDER — LOSARTAN POTASSIUM 50 MG/1
50 TABLET ORAL DAILY
Status: DISCONTINUED | OUTPATIENT
Start: 2024-10-10 | End: 2024-10-11 | Stop reason: HOSPADM

## 2024-10-09 RX ORDER — OXYCODONE HYDROCHLORIDE 5 MG/1
10 TABLET ORAL EVERY 4 HOURS PRN
Status: DISCONTINUED | OUTPATIENT
Start: 2024-10-09 | End: 2024-10-10

## 2024-10-09 RX ORDER — ALUMINUM HYDROXIDE, MAGNESIUM HYDROXIDE, AND SIMETHICONE 1200; 120; 1200 MG/30ML; MG/30ML; MG/30ML
30 SUSPENSION ORAL EVERY 4 HOURS PRN
Status: DISCONTINUED | OUTPATIENT
Start: 2024-10-09 | End: 2024-10-11 | Stop reason: HOSPADM

## 2024-10-09 RX ORDER — ASPIRIN 325 MG
325 TABLET ORAL 2 TIMES DAILY
Status: DISCONTINUED | OUTPATIENT
Start: 2024-10-09 | End: 2024-10-11 | Stop reason: HOSPADM

## 2024-10-09 RX ORDER — IBUPROFEN 200 MG
16-32 TABLET ORAL AS NEEDED
Status: DISCONTINUED | OUTPATIENT
Start: 2024-10-09 | End: 2024-10-09 | Stop reason: HOSPADM

## 2024-10-09 RX ORDER — POLYETHYLENE GLYCOL 3350 17 G/17G
17 POWDER, FOR SOLUTION ORAL DAILY
Status: CANCELLED | OUTPATIENT
Start: 2024-10-09

## 2024-10-09 RX ORDER — ACETAMINOPHEN 325 MG/1
975 TABLET ORAL
Status: DISCONTINUED | OUTPATIENT
Start: 2024-10-09 | End: 2024-10-10

## 2024-10-09 RX ORDER — INSULIN LISPRO 100 [IU]/ML
6-10 INJECTION, SOLUTION INTRAVENOUS; SUBCUTANEOUS
Status: CANCELLED | OUTPATIENT
Start: 2024-10-09

## 2024-10-09 RX ORDER — LOSARTAN POTASSIUM 50 MG/1
50 TABLET ORAL DAILY
Status: CANCELLED | OUTPATIENT
Start: 2024-10-10

## 2024-10-09 RX ORDER — DEXTROSE 40 %
15-30 GEL (GRAM) ORAL AS NEEDED
Status: DISCONTINUED | OUTPATIENT
Start: 2024-10-09 | End: 2024-10-09 | Stop reason: HOSPADM

## 2024-10-09 RX ORDER — ONDANSETRON HYDROCHLORIDE 2 MG/ML
4 INJECTION, SOLUTION INTRAVENOUS EVERY 8 HOURS PRN
Status: DISCONTINUED | OUTPATIENT
Start: 2024-10-09 | End: 2024-10-11 | Stop reason: HOSPADM

## 2024-10-09 RX ORDER — OXYCODONE HYDROCHLORIDE 5 MG/1
10 TABLET ORAL EVERY 4 HOURS PRN
Status: CANCELLED | OUTPATIENT
Start: 2024-10-09 | End: 2024-10-23

## 2024-10-09 RX ORDER — METFORMIN HYDROCHLORIDE 500 MG/1
1000 TABLET ORAL 2 TIMES DAILY WITH MEALS
Status: CANCELLED | OUTPATIENT
Start: 2024-10-10

## 2024-10-09 RX ORDER — ASPIRIN 325 MG
325 TABLET ORAL 2 TIMES DAILY
Status: CANCELLED | OUTPATIENT
Start: 2024-10-09

## 2024-10-09 RX ORDER — HYDROCHLOROTHIAZIDE 12.5 MG/1
12.5 TABLET ORAL DAILY
Status: CANCELLED | OUTPATIENT
Start: 2024-10-10

## 2024-10-09 RX ORDER — TRAMADOL HYDROCHLORIDE 50 MG/1
50 TABLET ORAL EVERY 6 HOURS PRN
Status: DISCONTINUED | OUTPATIENT
Start: 2024-10-09 | End: 2024-10-11 | Stop reason: HOSPADM

## 2024-10-09 RX ORDER — OXYCODONE HYDROCHLORIDE 5 MG/1
5 TABLET ORAL EVERY 4 HOURS PRN
Status: DISCONTINUED | OUTPATIENT
Start: 2024-10-09 | End: 2024-10-10

## 2024-10-09 RX ORDER — PANTOPRAZOLE SODIUM 40 MG/1
40 TABLET, DELAYED RELEASE ORAL
Status: DISCONTINUED | OUTPATIENT
Start: 2024-10-10 | End: 2024-10-11 | Stop reason: HOSPADM

## 2024-10-09 RX ORDER — DIPHENHYDRAMINE HCL 50 MG/ML
25 VIAL (ML) INJECTION EVERY 6 HOURS PRN
Status: DISCONTINUED | OUTPATIENT
Start: 2024-10-09 | End: 2024-10-11 | Stop reason: HOSPADM

## 2024-10-09 RX ORDER — HYDROCHLOROTHIAZIDE 12.5 MG/1
12.5 TABLET ORAL DAILY
Status: DISCONTINUED | OUTPATIENT
Start: 2024-10-10 | End: 2024-10-11 | Stop reason: HOSPADM

## 2024-10-09 RX ORDER — KETOROLAC TROMETHAMINE 15 MG/ML
INJECTION, SOLUTION INTRAMUSCULAR; INTRAVENOUS AS NEEDED
Status: DISCONTINUED | OUTPATIENT
Start: 2024-10-09 | End: 2024-10-09 | Stop reason: SURG

## 2024-10-09 RX ORDER — ONDANSETRON HYDROCHLORIDE 2 MG/ML
4 INJECTION, SOLUTION INTRAVENOUS
Status: DISCONTINUED | OUTPATIENT
Start: 2024-10-09 | End: 2024-10-09 | Stop reason: HOSPADM

## 2024-10-09 RX ORDER — BISACODYL 10 MG/1
10 SUPPOSITORY RECTAL DAILY PRN
Status: DISCONTINUED | OUTPATIENT
Start: 2024-10-09 | End: 2024-10-11 | Stop reason: HOSPADM

## 2024-10-09 RX ORDER — ONDANSETRON 4 MG/1
4 TABLET, ORALLY DISINTEGRATING ORAL EVERY 8 HOURS PRN
Status: DISCONTINUED | OUTPATIENT
Start: 2024-10-09 | End: 2024-10-11 | Stop reason: HOSPADM

## 2024-10-09 RX ORDER — INSULIN LISPRO 100 [IU]/ML
6-10 INJECTION, SOLUTION INTRAVENOUS; SUBCUTANEOUS
Status: DISCONTINUED | OUTPATIENT
Start: 2024-10-09 | End: 2024-10-11 | Stop reason: HOSPADM

## 2024-10-09 RX ORDER — OXYCODONE HYDROCHLORIDE 5 MG/1
5 TABLET ORAL EVERY 4 HOURS PRN
Status: CANCELLED | OUTPATIENT
Start: 2024-10-09

## 2024-10-09 RX ORDER — LIDOCAINE HYDROCHLORIDE 10 MG/ML
INJECTION, SOLUTION EPIDURAL; INFILTRATION; INTRACAUDAL; PERINEURAL
Status: COMPLETED | OUTPATIENT
Start: 2024-10-09 | End: 2024-10-09

## 2024-10-09 RX ORDER — ALUMINUM HYDROXIDE, MAGNESIUM HYDROXIDE, AND SIMETHICONE 1200; 120; 1200 MG/30ML; MG/30ML; MG/30ML
30 SUSPENSION ORAL EVERY 4 HOURS PRN
Status: CANCELLED | OUTPATIENT
Start: 2024-10-09 | End: 2025-01-07

## 2024-10-09 RX ORDER — AMOXICILLIN 250 MG
1 CAPSULE ORAL 2 TIMES DAILY
Status: DISCONTINUED | OUTPATIENT
Start: 2024-10-09 | End: 2024-10-11 | Stop reason: HOSPADM

## 2024-10-09 RX ORDER — ONDANSETRON HYDROCHLORIDE 2 MG/ML
INJECTION, SOLUTION INTRAVENOUS AS NEEDED
Status: DISCONTINUED | OUTPATIENT
Start: 2024-10-09 | End: 2024-10-09 | Stop reason: SURG

## 2024-10-09 RX ORDER — POLYETHYLENE GLYCOL 3350 17 G/17G
17 POWDER, FOR SOLUTION ORAL DAILY
Status: DISCONTINUED | OUTPATIENT
Start: 2024-10-09 | End: 2024-10-11 | Stop reason: HOSPADM

## 2024-10-09 RX ORDER — BUPIVACAINE HYDROCHLORIDE AND EPINEPHRINE 5; 5 MG/ML; UG/ML
INJECTION, SOLUTION EPIDURAL; INTRACAUDAL; PERINEURAL
Status: DISCONTINUED | OUTPATIENT
Start: 2024-10-09 | End: 2024-10-09 | Stop reason: HOSPADM

## 2024-10-09 RX ORDER — KETOROLAC TROMETHAMINE 15 MG/ML
INJECTION, SOLUTION INTRAMUSCULAR; INTRAVENOUS AS NEEDED
Status: DISCONTINUED | OUTPATIENT
Start: 2024-10-09 | End: 2024-10-09

## 2024-10-09 RX ORDER — SODIUM CHLORIDE, SODIUM LACTATE, POTASSIUM CHLORIDE, CALCIUM CHLORIDE 600; 310; 30; 20 MG/100ML; MG/100ML; MG/100ML; MG/100ML
INJECTION, SOLUTION INTRAVENOUS CONTINUOUS
Status: CANCELLED | OUTPATIENT
Start: 2024-10-09

## 2024-10-09 RX ORDER — ACETAMINOPHEN 325 MG/1
975 TABLET ORAL EVERY 8 HOURS
Status: CANCELLED | OUTPATIENT
Start: 2024-10-09 | End: 2024-10-23

## 2024-10-09 RX ORDER — PREGABALIN 75 MG/1
150 CAPSULE ORAL ONCE
Status: COMPLETED | OUTPATIENT
Start: 2024-10-09 | End: 2024-10-09

## 2024-10-09 RX ORDER — AMOXICILLIN 250 MG
1 CAPSULE ORAL 2 TIMES DAILY
Status: CANCELLED | OUTPATIENT
Start: 2024-10-09

## 2024-10-09 RX ORDER — FAMOTIDINE 10 MG/ML
INJECTION INTRAVENOUS AS NEEDED
Status: DISCONTINUED | OUTPATIENT
Start: 2024-10-09 | End: 2024-10-09 | Stop reason: SURG

## 2024-10-09 RX ORDER — HYDROMORPHONE HYDROCHLORIDE 1 MG/ML
0.5 INJECTION, SOLUTION INTRAMUSCULAR; INTRAVENOUS; SUBCUTANEOUS
Status: CANCELLED | OUTPATIENT
Start: 2024-10-09 | End: 2025-01-07

## 2024-10-09 RX ORDER — TRAZODONE HYDROCHLORIDE 50 MG/1
25 TABLET ORAL NIGHTLY
Status: DISCONTINUED | OUTPATIENT
Start: 2024-10-09 | End: 2024-10-11 | Stop reason: HOSPADM

## 2024-10-09 RX ORDER — VALACYCLOVIR HYDROCHLORIDE 500 MG/1
500 TABLET, FILM COATED ORAL DAILY
Status: DISCONTINUED | OUTPATIENT
Start: 2024-10-09 | End: 2024-10-11 | Stop reason: HOSPADM

## 2024-10-09 RX ORDER — ESMOLOL HYDROCHLORIDE 10 MG/ML
INJECTION INTRAVENOUS AS NEEDED
Status: DISCONTINUED | OUTPATIENT
Start: 2024-10-09 | End: 2024-10-09 | Stop reason: SURG

## 2024-10-09 RX ORDER — METFORMIN HYDROCHLORIDE 500 MG/1
1000 TABLET ORAL 2 TIMES DAILY WITH MEALS
Status: DISCONTINUED | OUTPATIENT
Start: 2024-10-10 | End: 2024-10-11 | Stop reason: HOSPADM

## 2024-10-09 RX ADMIN — SUGAMMADEX 200 MG: 100 INJECTION, SOLUTION INTRAVENOUS at 13:58

## 2024-10-09 RX ADMIN — ESMOLOL HYDROCHLORIDE 30 MG: 100 INJECTION, SOLUTION INTRAVENOUS at 12:44

## 2024-10-09 RX ADMIN — ACETAMINOPHEN 975 MG: 325 TABLET ORAL at 09:21

## 2024-10-09 RX ADMIN — SODIUM CHLORIDE: 9 INJECTION, SOLUTION INTRAVENOUS at 12:24

## 2024-10-09 RX ADMIN — SUCCINYLCHOLINE CHLORIDE 200 MG: 20 INJECTION, SOLUTION INTRAMUSCULAR; INTRAVENOUS; PARENTERAL at 12:01

## 2024-10-09 RX ADMIN — KETOROLAC TROMETHAMINE 7.5 MG: 15 INJECTION, SOLUTION INTRAMUSCULAR; INTRAVENOUS at 13:05

## 2024-10-09 RX ADMIN — TRANEXAMIC ACID 1000 MG: 100 INJECTION, SOLUTION INTRAVENOUS at 13:07

## 2024-10-09 RX ADMIN — HYDROMORPHONE HYDROCHLORIDE 0.5 MG: 1 INJECTION, SOLUTION INTRAMUSCULAR; INTRAVENOUS; SUBCUTANEOUS at 12:45

## 2024-10-09 RX ADMIN — KETOROLAC TROMETHAMINE 7.5 MG: 15 INJECTION, SOLUTION INTRAMUSCULAR; INTRAVENOUS at 20:20

## 2024-10-09 RX ADMIN — ONDANSETRON 4 MG: 2 INJECTION INTRAMUSCULAR; INTRAVENOUS at 19:09

## 2024-10-09 RX ADMIN — SODIUM CHLORIDE 1500 MG: 9 INJECTION, SOLUTION INTRAVENOUS at 09:49

## 2024-10-09 RX ADMIN — PREGABALIN 75 MG: 75 CAPSULE ORAL at 20:20

## 2024-10-09 RX ADMIN — SENNOSIDES AND DOCUSATE SODIUM 1 TABLET: 8.6; 5 TABLET ORAL at 20:20

## 2024-10-09 RX ADMIN — HYDROMORPHONE HYDROCHLORIDE 0.5 MG: 1 INJECTION, SOLUTION INTRAMUSCULAR; INTRAVENOUS; SUBCUTANEOUS at 12:00

## 2024-10-09 RX ADMIN — Medication 100 MG: at 11:57

## 2024-10-09 RX ADMIN — FENTANYL CITRATE 50 MCG: 50 INJECTION, SOLUTION INTRAMUSCULAR; INTRAVENOUS at 12:15

## 2024-10-09 RX ADMIN — DEXAMETHASONE SODIUM PHOSPHATE 6 MG: 4 INJECTION, SOLUTION INTRAMUSCULAR; INTRAVENOUS at 13:05

## 2024-10-09 RX ADMIN — PROPOFOL 200 MG: 10 INJECTION, EMULSION INTRAVENOUS at 12:01

## 2024-10-09 RX ADMIN — FAMOTIDINE 20 MG: 10 INJECTION, SOLUTION INTRAVENOUS at 13:05

## 2024-10-09 RX ADMIN — FENTANYL CITRATE 100 MCG: 50 INJECTION, SOLUTION INTRAMUSCULAR; INTRAVENOUS at 12:00

## 2024-10-09 RX ADMIN — ACETAMINOPHEN 975 MG: 325 TABLET ORAL at 22:59

## 2024-10-09 RX ADMIN — OXYCODONE HYDROCHLORIDE 10 MG: 5 TABLET ORAL at 23:00

## 2024-10-09 RX ADMIN — CEFAZOLIN 2 G: 2 INJECTION, POWDER, FOR SOLUTION INTRAMUSCULAR; INTRAVENOUS at 20:20

## 2024-10-09 RX ADMIN — ASPIRIN 325 MG: 325 TABLET ORAL at 20:20

## 2024-10-09 RX ADMIN — SODIUM CHLORIDE, POTASSIUM CHLORIDE, SODIUM LACTATE AND CALCIUM CHLORIDE: 600; 310; 30; 20 INJECTION, SOLUTION INTRAVENOUS at 15:10

## 2024-10-09 RX ADMIN — CEFAZOLIN 2 G: 2 INJECTION, POWDER, FOR SOLUTION INTRAMUSCULAR; INTRAVENOUS at 11:52

## 2024-10-09 RX ADMIN — ONDANSETRON HYDROCHLORIDE 4 MG: 2 SOLUTION INTRAMUSCULAR; INTRAVENOUS at 13:30

## 2024-10-09 RX ADMIN — HYDROMORPHONE HYDROCHLORIDE 0.5 MG: 1 INJECTION, SOLUTION INTRAMUSCULAR; INTRAVENOUS; SUBCUTANEOUS at 12:26

## 2024-10-09 RX ADMIN — POVIDONE-IODINE 1 EACH: 5 SOLUTION TOPICAL at 09:23

## 2024-10-09 RX ADMIN — LIDOCAINE HYDROCHLORIDE 5 ML: 10 INJECTION, SOLUTION EPIDURAL; INFILTRATION; INTRACAUDAL; PERINEURAL at 12:01

## 2024-10-09 RX ADMIN — ESMOLOL HYDROCHLORIDE 30 MG: 100 INJECTION, SOLUTION INTRAVENOUS at 13:34

## 2024-10-09 RX ADMIN — FENTANYL CITRATE 50 MCG: 50 INJECTION, SOLUTION INTRAMUSCULAR; INTRAVENOUS at 12:26

## 2024-10-09 RX ADMIN — PREGABALIN 150 MG: 75 CAPSULE ORAL at 09:21

## 2024-10-09 RX ADMIN — INSULIN LISPRO 6 UNITS: 100 INJECTION, SOLUTION INTRAVENOUS; SUBCUTANEOUS at 22:54

## 2024-10-09 RX ADMIN — TRAZODONE HYDROCHLORIDE 25 MG: 50 TABLET ORAL at 22:54

## 2024-10-09 NOTE — ANESTHESIOLOGIST PRE-PROCEDURE ATTESTATION
Pre-Procedure Patient Identification:  I am the Primary Anesthesiologist and have identified the patient on 10/09/24 at 9:14 AM.   I have confirmed the procedure(s) will be performed by the following surgeon/proceduralist Kit Chan DO.

## 2024-10-09 NOTE — PROGRESS NOTES
Patient: Fanny Reddy  Location: [facility] Operating Room OR  MRN:  [id number]  Today's date:  10/9/2024    Attempted to see patient for therapy. Unable due to medical hold (Per RN, pt is not appropriate yet for PT IE. Not awake yet.).

## 2024-10-09 NOTE — PROGRESS NOTES
Ortho Daily Progress Note    Subjective     Interval History: Patient seen and examined at bedside. Pain well controlled. Denies nausea, vomiting, chills, SOB or chest pain. Patient denies new onset numbness/parasthesia/focal weakness. Patient understands plans. Questions answered.       Objective     Vital signs in last 24 hours:  Temp:  [36 °C (96.8 °F)-36.4 °C (97.6 °F)] 36 °C (96.8 °F)  Heart Rate:  [] 102  Resp:  [11-18] 11  BP: (125-200)/(78-79) 169/79    No intake or output data in the 24 hours ending 10/09/24 1442  Intake/Output this shift:  No intake/output data recorded.    Labs:            Microbiology Results       Procedure Component Value Units Date/Time    COVID-19 PAT/Pre-procedural [[id number]]  (Normal) Collected: 04/22/21 1206    Specimen: Nasopharyngeal Swab from Nasopharynx Updated: 04/23/21 0220    Narrative:      The following orders were created for panel order COVID-19 PAT/Pre-procedural.  Procedure                               Abnormality         Status                     ---------                               -----------         ------                     SARS-CoV-2 (COVID-19), PCR[[id number]]    Normal              Final result                 Please view results for these tests on the individual orders.    SARS-CoV-2 (COVID-19), PCR [[id number]]  (Normal) Collected: 04/22/21 1206    Specimen: Nasopharyngeal Swab from Nasopharynx Updated: 04/23/21 0220     SARS-CoV-2 (COVID-19) Negative              Imaging:  PO XR L knee: well aligned total knee prosthesis with post-surgical soft tissue changes present. No intraoperative fractures noted.      Physical Exam:  Gen: NAD, Cooperative  Respiratory: no Respiratory distress  MSK:  LLE  No obvious deformity. Dressing CDI  Motor intact quads/hamstrings/ehl/fhl/df/pf/peroneals  SILT sural/saphenous/spn/dpn/tibial n  Compartments soft and compressible  Foot WWP, 2+DP    A/P:   74-year-old female status post left total knee arthroplasty with   Avart on 10/9/2024    Ancef 24 hours  WBAT LLE  PT/OT  OOB  DC PLANNING  DVT: ASA  Pain control

## 2024-10-09 NOTE — HOSPITAL COURSE
Fanny is a 74 y.o. female admitted on 10/9/2024 with Osteoarthritis of left knee, unspecified osteoarthritis type [M17.12]  Arthritis of left knee [M17.12]. Principal problem is No Principal Problem: There is no principal problem currently on the Problem List. Please update the Problem List and refresh..    Past Medical History  Fanny has a past medical history of Cutaneous abscess of neck (02/07/2019), GERD (gastroesophageal reflux disease), Hypertension, Irritable bowel syndrome with diarrhea, Lipid disorder, Meralgia paraesthetica, right, Seasonal allergies, and Type 2 diabetes mellitus (CMS/[facility]).    History of Present Illness   74-year-old female status post left total knee arthroplasty with Dr. Chan on 10/9/2024

## 2024-10-09 NOTE — ANESTHESIA PREPROCEDURE EVALUATION
Relevant Problems   CARDIOVASCULAR   (+) Primary hypertension      GASTROINTESTINAL   (+) GERD (gastroesophageal reflux disease)      MUSCULOSKELETAL   (+) Primary localized osteoarthritis of left knee      NEUROLOGY   (+) Meralgia paraesthetica, right      Other   (+) Type 2 diabetes mellitus (CMS/HCC)   (+) Type 2 diabetes mellitus with hyperglycemia (CMS/HCC)       ROS/Med Hx     Past Surgical History   Procedure Laterality Date    Hysterectomy      Tonsillectomy      UPPER GASTROINTESTINAL ENDOSCOPY WITH BIOPSY N/A 4/10/2024    Performed by Nathan Willams MD at [facility]       Physical Exam    Airway   Mallampati: II   TM distance: >3 FB   Neck ROM: full  Cardiovascular - normal   Rhythm: regular   Rate: normalPulmonary - normal   clear to auscultation  Dental - normal        Anesthesia Plan    Plan: spinal and MAC    Technique: spinal     Lines and Monitors: additional IV     3 ASA

## 2024-10-09 NOTE — ANESTHESIA POSTPROCEDURE EVALUATION
Patient: Fanny Reddy    Procedure Summary       Date: 10/09/24 Room / [facility] OR [facility] / [facility]    Anesthesia Start: 1059 Anesthesia Stop: 1415    Procedure: Left Knee Arthroplasty (Left) Diagnosis:       Osteoarthritis of left knee, unspecified osteoarthritis type      (M17.12 DJD)    Surgeons: Kit Chan DO Responsible Provider: René Simpson MD    Anesthesia Type: spinal, MAC ASA Status: 3            Anesthesia Type: spinal, MAC  PACU Vitals  10/9/2024 1408 - 10/9/2024 1508        10/9/2024  1417 10/9/2024  1430 10/9/2024  1445 10/9/2024  1500    BP: 200/79 169/79 154/88 140/88    Temp: 36 °C (96.8 °F) -- -- --    Pulse: 97 102 102 96    Resp: 14 11 12 10    SpO2: 95 % 97 % 99 % 95 %              Anesthesia Post Evaluation    Pain management: adequate  Patient participation: complete - patient participated  Level of consciousness: awake and alert  Cardiovascular status: acceptable  Airway Patency: adequate  Respiratory status: acceptable  Hydration status: acceptable  Anesthetic complications: no

## 2024-10-09 NOTE — BRIEF OP NOTE
Left Knee Arthroplasty (L) Procedure Note    Procedure:    Left Knee Arthroplasty  CPT(R) Code:  88521 - [place] TOTAL KNEE ARTHROPLASTY      Pre-op Diagnosis      * Osteoarthritis of left knee, unspecified osteoarthritis type [M17.12]       Post-op Diagnosis     * Osteoarthritis of left knee, unspecified osteoarthritis type [M17.12]    Surgeons and Role:     * Kit Chan, DO - Primary     * Mao Castrejon DO - Resident - Assisting    Anesthesia: Choice    Staff:   Circulator: Stuart Langston RN; Niurka Lozano RN  Scrub Person: Celestino Trivedi; Rafita Corbett RN    Procedure Details   L TKA    Estimated Blood Loss: No blood loss documented.    Specimens:                Order Name Source Comment Collection Info Order Time   REPEAT ABO/RH (TYPE CHECK) Blood, Venous   10/9/2024  9:05 AM     Release to patient   Immediate              Drains: * No LDAs found *    Implants:   Implant Name Type Inv. Item Serial No.  Lot No. LRB No. Used Action   CEMENT BONE PALACOS RADIOPAQUE - SN/A - [id number]  CEMENT BONE PALACOS RADIOPAQUE N/A [employer] [id number] Left 1 Implanted   CEMENT BONE PALACOS RADIOPAQUE - SN/A - [id number]  CEMENT BONE PALACOS RADIOPAQUE N/A [employer] [id number] Left 1 Implanted   COMPONENT PATELLAR                                      ALL - SN/A - [id number] Patellar implant COMPONENT PATELLAR                                      ALL N/A [employer] [id number] Left 1 Implanted   COMPONENT FEMORAL CCR STD PERSONA SZ 6 LEFT - SN/A - [id number] Knee Construct COMPONENT FEMORAL CCR STD PERSONA SZ 6 LEFT N/A [employer] [id number] Left 1 Implanted   COMPONENT TIBIAL ARTIC SURFC PERSONA 10MM PLY 6-9CD LEFT - SN/A - [id number] Knee Construct COMPONENT TIBIAL ARTIC SURFC PERSONA 10MM PLY 6-9CD LEFT N/A [employer] [id number] Left 1 Implanted   COMPONENT TIBIAL STEM PERSONA - SN/A - [id number] Knee Construct COMPONENT TIBIAL STEM PERSONA N/A [employer] [id number] Left 1 Implanted               Complications:  None; patient tolerated the procedure well.           Disposition: PACU - hemodynamically stable.           Condition: stable    Kit Chan DO  Phone Number: [phone]

## 2024-10-09 NOTE — PLAN OF CARE
Plan of Care Review  Plan of Care Reviewed With: patient  Progress: no change  Outcome Evaluation: received py from PACu, sleepy. pt arouasable, pt asked what time it was then fell back asleep. unable to work with at this time.

## 2024-10-09 NOTE — PROGRESS NOTES
Patient: Fanny Reddy  Location: [place] [address]  MRN:  [id number]  Today's date:  10/9/2024    Attempted to see patient for therapy. Unable due to medical hold (second attempt to see pt for PT. per RN pt continues to be too lethargic to participate in therapy, difficulty maintaining alertness. will follow up as able for PT eval).

## 2024-10-09 NOTE — OP NOTE
DATE OF SURGERY: 10/9/2024        SURGEON:  Kit Chan DO     PREOPERATIVE DIAGNOSIS: Left knee severe tricompartmental osteoarthritis.     POSTOPERATIVE DIAGNOSIS: Left knee severe tricompartmental osteoarthritis.     PROCEDURES: Left total knee arthroplasty.     ASSISTANT: Mao Peña DO     ANESTHESIA:  Spinal     ESTIMATED BLOOD LOSS:  150 mL     COMPLICATIONS:  None.     TOURNIQUET TIME: 88 minutes.     IMPLANTS USED:  Ap Persona posterior-stabilized femoral component SIZE 6 cemented, tibial base plate fixed bearing size c cemented, and patella 35 mm cemented button and tibial insert fixed bearing posterior stabilized size 10mm.     INDICATION:  The patient is a pleasant 74 year old female seen in the office, diagnosed with severe arthrosis of the left knee. The patient had failed conservative treatment including cortisone injections, physical therapy, and anti-inflammatory medications and given that the pain was affecting her quality of life, she had opted to proceed with left knee replacement surgery. Risks and benefits of the surgery were explained to the patient. The patient willingly signed the consent.     DESCRIPTION OF PROCEDURE:  The patient was taken to the operating room and rested supine on the OR table.  After I correctly identified the patient as the correct patient and the left knee as the correct knee, members of the Anesthesia Department induced spinal anesthesia. The patient was placed supine and a well-padded tourniquet was applied. The left lower extremity was prepped and draped in a standard sterile fashion.  Esmarch was used to exsanguinate the left lower extremity.  The tourniquet was inflated to 300 mmHg.  A midline incision was made.  A medial parapatellar arthrotomy was performed.  Severe arthrosis was noted tricompartmentally. ACL and PCL were resected and so were the menisci. Infrapatellar fat pad was excised and we were able to make the distal femoral resection at 5 degrees of  valgus followed by cutting the proximal tibia using an extramedullary cutting device.  Appropriate gap balancing was carried out. We were able to get full extension with an 10 mm block and we had good alignment. At this point, the distal femur was sized, it was found to be size 6. A 4-in-1 cutting block was placed in appropriate external rotation.  Cuts were made. The flexion gap was found to be satisfactory.  Notch cutting device was used and it was appropriately lateralized.  Medial osteophytes were taken down as required after tibia had been sized using a size C component and the component had been externally rotated and lateralized. The trial components were placed on the femur and the tibia. The joint was reduced with the 10 mm insert and patella was resurfaced with a 35 mm button.  We had full extension, excellent flexion, good gap balancing, good patellar tracking was noted and at this point, all trial components removed.  Copious irrigation performed.  Bony surfaces were dried.  The tibia was cemented first, followed by the femur and joint was compressed with a 10 mm poly. The patella was cemented and a patellar clamp was applied.  Cement was allowed to harden. Copious irrigation was performed. Cement particles were removed as required and poly insert trial had been removed and we were able to impact the final poly. With final components appropriately placed, the joint was reduced.  We had full extension, excellent flexion, good gap balancing, good patellar tracking was noted.  Copious irrigation was performed.  Arthrotomy was closed using size 0 Vicryl and then #2 StrataFix. The tourniquet deflated at 81 minutes. We proceeded with standard closure of the wound with size 0 Vicryl, 2-0 vicryl, 3-0 STRATAFIX, and surgical glue.  Sterile dressings were applied.  The patient was awakened and transferred to the postanesthesia unit in stable condition.  Postoperatively, she will be admitted to my service. She will  be on DVT prophylaxis. She will be on antibiotics x24 hours.     Mao Castrejon, DO PGY-5  *[phone]

## 2024-10-09 NOTE — ANESTHESIA PROCEDURE NOTES
Peripheral Block    Reason for block: at surgeon's request and post-op pain management  Staffing  Anesthesiologist: René Simpson MD  Performed by: René Simpson MD  Authorized by: René Simpson MD    Preanesthetic Checklist  Completed: patient identified, surgical consent, pre-op evaluation, timeout performed, IV checked, risks and benefits discussed, monitors and equipment checked and sterile field maintained during procedure  Peripheral Block  Patient position: supine  Prep: ChloraPrep and site prepped and draped  Patient monitoring: heart rate, cardiac monitor, continuous pulse ox and blood pressure  Laterality: left      Guidance: nerve stimulator and ultrasound guided    Image visualization comments : Ultrasound used to visualize needle placement in appropriate tissue plane.: Ultrasound used to visualize medication disbursement around appropriate nerve structures.      Needle  Needle gauge: 20 G  Needle length: 4 in  Test dose: negative  Assessment  Injection assessment: negative aspiration for heme, incremental injection, no paresthesia on injection, local visualized surrounding nerve on ultrasound and transient paresthesias  Paresthesia pain: immediately resolved  Heart rate change: no  Additional Notes  Left Adductor Canal.  15mL Lidocaine 1% injected under US guidance, neg asp, no heme no complications

## 2024-10-09 NOTE — ANESTHESIA PROCEDURE NOTES
Airway  Urgency: elective    Start Time: 10/9/2024 12:01 PM  Stop Time: 10/9/2024 12:02 PM    Airway not difficult    General Information and Staff    Patient location during procedure: OR  Performed by: René Simpson MD  Authorized by: René Simpson MD      Indications and Patient Condition  Indications for airway management: anesthesia  Sedation level: deep  Preoxygenated: yes  Patient position: sniffing      Final Airway Details  Final airway type: endotracheal airway      Successful airway: ETT     Successful intubation technique: direct laryngoscopy  Facilitating devices/methods: intubating stylet  Endotracheal tube insertion site: oral  Blade: Abelino  Blade size: #4  ETT size (mm): 7.0  Placement verified by: chest auscultation and capnometry   Measured from: lips  ETT to lips (cm): 22  Number of attempts at approach: 1        Additional Comments  UTILIZED [facility]    Medications Administered -   succinylcholine (ANECTINE) injection 20 mg/mL - intravenous   200 mg - 10/9/2024 12:01:00 PM  propofol (DIPRIVAN) bolus injection 10 mg/mL - intravenous, Right Forearm   200 mg - 10/9/2024 12:01:00 PM  lidocaine PF (XYLOCAINE) injection 1% - intravenous   5 mL - 10/9/2024 12:01:00 PM

## 2024-10-09 NOTE — DISCHARGE INSTRUCTIONS
Please do not exceed 3,000mg of tylenol (acetaminophen) in a 24 hour period.         POST-OPERATIVE (AFTER SURGERY INSTRUCTIONS) - KNEE REPLACEMENT    [facility] Main Phone: [phone]   Call [phone] for patient emergencies during or after business hours  [facility]   [address], [place] [address]      DISCHARGE INSTRUCTIONS          1.    You have been given a prescription for pain medication. Take as prescribed. Do not operate a motor vehicle or any heavy equipment while on pain medication.   2.    You have been prescribed Asprin 325 mg po BID for 6 weeks for deep vein thrombosis prophylaxis.  Take as prescribed to decrease your risk of getting a blood clot.    3.    Follow the instructions of physical therapy. Walk with assistive devices as needed.   4.    Remove the Aquacel dressing from the incision 5 days after surgery. Then you may leave it uncovered. You may cover it if you prefer.  5.     You may shower while Aquacel dressing is on as it is waterproof.  Once  the Aquacel dressing is removed, keep incision covered while showering.  You may get your incision wet after your visit with .  6.    If you notice any increased bleeding, swelling, drainage, redness, warmth, pain, fevers, loss of extremity strength or sensation, call the office at tel #  [phone] or [phone] or go to the emergency department.   7.   If you notice a rapid heart rate, shortness of breath or chest pain, present to the emergency department.   8.   Your follow up appointment will be 3 weeks from surgery.  9.  If you have staples, they will be removed 3 weeks from surgery  10.  For any further questions, please do not hesitate to call the office.   11. Bowel regimen: Continue to take either Miralax daily or senna/colace twice a day until your bowel function has returned to normal. If you have not had a bowel movement by postoperative day 3, increase your Miralax  to twice a day and/or use a dulcolax suppository.  If you have not had a bowel movement by postoperative day 4 after increasing your bowel medications, contact your primary care provider for further management and treatment.     *For your and/or your ’s information, important details about activity and expectations that was reviewed during your hospital stay, can be at found in our Discharge Video overview at [email or web address]

## 2024-10-10 LAB
ANION GAP SERPL CALC-SCNC: 9 MEQ/L (ref 3–15)
BUN SERPL-MCNC: 15 MG/DL (ref 7–25)
CALCIUM SERPL-MCNC: 8.3 MG/DL (ref 8.6–10.3)
CHLORIDE SERPL-SCNC: 107 MEQ/L (ref 98–107)
CO2 SERPL-SCNC: 24 MEQ/L (ref 21–31)
CREAT SERPL-MCNC: 0.9 MG/DL (ref 0.6–1.2)
EGFRCR SERPLBLD CKD-EPI 2021: >60 ML/MIN/1.73M*2
GLUCOSE BLD-MCNC: 120 MG/DL (ref 70–99)
GLUCOSE BLD-MCNC: 132 MG/DL (ref 70–99)
GLUCOSE BLD-MCNC: 159 MG/DL (ref 70–99)
GLUCOSE BLD-MCNC: 165 MG/DL (ref 70–99)
GLUCOSE SERPL-MCNC: 147 MG/DL (ref 70–99)
POCT TEST: ABNORMAL
POTASSIUM SERPL-SCNC: 4.2 MEQ/L (ref 3.5–5.1)
SODIUM SERPL-SCNC: 140 MEQ/L (ref 136–145)

## 2024-10-10 PROCEDURE — 25800000 HC PHARMACY IV SOLUTIONS: Performed by: PHYSICIAN ASSISTANT

## 2024-10-10 PROCEDURE — 80048 BASIC METABOLIC PNL TOTAL CA: CPT | Performed by: PHYSICIAN ASSISTANT

## 2024-10-10 PROCEDURE — 97530 THERAPEUTIC ACTIVITIES: CPT | Mod: GO

## 2024-10-10 PROCEDURE — 97166 OT EVAL MOD COMPLEX 45 MIN: CPT | Mod: GO

## 2024-10-10 PROCEDURE — 63700000 HC SELF-ADMINISTRABLE DRUG: Performed by: PHYSICIAN ASSISTANT

## 2024-10-10 PROCEDURE — 97162 PT EVAL MOD COMPLEX 30 MIN: CPT | Mod: GP

## 2024-10-10 PROCEDURE — 97535 SELF CARE MNGMENT TRAINING: CPT | Mod: GO

## 2024-10-10 PROCEDURE — 36415 COLL VENOUS BLD VENIPUNCTURE: CPT | Performed by: PHYSICIAN ASSISTANT

## 2024-10-10 PROCEDURE — 63600000 HC DRUGS/DETAIL CODE: Performed by: PHYSICIAN ASSISTANT

## 2024-10-10 PROCEDURE — 97116 GAIT TRAINING THERAPY: CPT | Mod: GP

## 2024-10-10 RX ORDER — HYDROCODONE BITARTRATE AND ACETAMINOPHEN 5; 325 MG/1; MG/1
1 TABLET ORAL EVERY 6 HOURS PRN
Status: DISCONTINUED | OUTPATIENT
Start: 2024-10-10 | End: 2024-10-11 | Stop reason: HOSPADM

## 2024-10-10 RX ORDER — HYDROCODONE BITARTRATE AND ACETAMINOPHEN 5; 325 MG/1; MG/1
2 TABLET ORAL EVERY 6 HOURS PRN
Status: DISCONTINUED | OUTPATIENT
Start: 2024-10-10 | End: 2024-10-11 | Stop reason: HOSPADM

## 2024-10-10 RX ADMIN — EMPAGLIFLOZIN 10 MG: 10 TABLET, FILM COATED ORAL at 08:22

## 2024-10-10 RX ADMIN — OXYCODONE HYDROCHLORIDE 5 MG: 5 TABLET ORAL at 12:00

## 2024-10-10 RX ADMIN — TRAZODONE HYDROCHLORIDE 25 MG: 50 TABLET ORAL at 21:06

## 2024-10-10 RX ADMIN — HYDROCODONE BITARTRATE AND ACETAMINOPHEN 2 TABLET: 5; 325 TABLET ORAL at 23:30

## 2024-10-10 RX ADMIN — KETOROLAC TROMETHAMINE 7.5 MG: 15 INJECTION, SOLUTION INTRAMUSCULAR; INTRAVENOUS at 09:40

## 2024-10-10 RX ADMIN — ROSUVASTATIN CALCIUM 20 MG: 20 TABLET, FILM COATED ORAL at 17:59

## 2024-10-10 RX ADMIN — PREGABALIN 75 MG: 75 CAPSULE ORAL at 08:22

## 2024-10-10 RX ADMIN — ACETAMINOPHEN 975 MG: 325 TABLET ORAL at 08:22

## 2024-10-10 RX ADMIN — METFORMIN HYDROCHLORIDE 1000 MG: 500 TABLET ORAL at 08:22

## 2024-10-10 RX ADMIN — HYDROCODONE BITARTRATE AND ACETAMINOPHEN 2 TABLET: 5; 325 TABLET ORAL at 18:04

## 2024-10-10 RX ADMIN — KETOROLAC TROMETHAMINE 7.5 MG: 15 INJECTION, SOLUTION INTRAMUSCULAR; INTRAVENOUS at 04:12

## 2024-10-10 RX ADMIN — METFORMIN HYDROCHLORIDE 1000 MG: 500 TABLET ORAL at 18:00

## 2024-10-10 RX ADMIN — VALACYCLOVIR HYDROCHLORIDE 500 MG: 500 TABLET, FILM COATED ORAL at 17:59

## 2024-10-10 RX ADMIN — CEFAZOLIN 2 G: 2 INJECTION, POWDER, FOR SOLUTION INTRAMUSCULAR; INTRAVENOUS at 04:16

## 2024-10-10 RX ADMIN — KETOROLAC TROMETHAMINE 7.5 MG: 15 INJECTION, SOLUTION INTRAMUSCULAR; INTRAVENOUS at 16:21

## 2024-10-10 RX ADMIN — SODIUM CHLORIDE 500 ML: 9 INJECTION, SOLUTION INTRAVENOUS at 09:30

## 2024-10-10 RX ADMIN — PANTOPRAZOLE SODIUM 40 MG: 40 TABLET, DELAYED RELEASE ORAL at 08:22

## 2024-10-10 RX ADMIN — KETOROLAC TROMETHAMINE 7.5 MG: 15 INJECTION, SOLUTION INTRAMUSCULAR; INTRAVENOUS at 21:06

## 2024-10-10 RX ADMIN — TRAMADOL HYDROCHLORIDE 50 MG: 50 TABLET, COATED ORAL at 21:32

## 2024-10-10 RX ADMIN — ASPIRIN 325 MG: 325 TABLET ORAL at 08:22

## 2024-10-10 RX ADMIN — LOSARTAN POTASSIUM 50 MG: 50 TABLET, FILM COATED ORAL at 09:40

## 2024-10-10 RX ADMIN — ASPIRIN 325 MG: 325 TABLET ORAL at 21:06

## 2024-10-10 ASSESSMENT — COGNITIVE AND FUNCTIONAL STATUS - GENERAL
DRESSING REGULAR LOWER BODY CLOTHING: 3 - A LITTLE
WALKING IN HOSPITAL ROOM: 3 - A LITTLE
HELP NEEDED FOR BATHING: 3 - A LITTLE
HELP NEEDED FOR PERSONAL GROOMING: 4 - NONE
STANDING UP FROM CHAIR USING ARMS: 3 - A LITTLE
EATING MEALS: 4 - NONE
WALKING IN HOSPITAL ROOM: 3 - A LITTLE
AFFECT: WFL
CLIMB 3 TO 5 STEPS WITH RAILING: 3 - A LITTLE
MOVING TO AND FROM BED TO CHAIR: 3 - A LITTLE
MOVING TO AND FROM BED TO CHAIR: 3 - A LITTLE
CLIMB 3 TO 5 STEPS WITH RAILING: 3 - A LITTLE
TOILETING: 4 - NONE
STANDING UP FROM CHAIR USING ARMS: 3 - A LITTLE
AFFECT: WFL
DRESSING REGULAR UPPER BODY CLOTHING: 4 - NONE

## 2024-10-10 NOTE — PROGRESS NOTES
Ortho Daily Progress Note    Subjective     POD 1: Examined at bedside, resting comfortably. NAEON. Pain controlled. Has been OOB to the bathroom. PT/OT attempted to see twice but too lethargic. Post op Hgb pending. No new complaints. AVSS. Dressings CDI. NVI distally. Denies fevers, HA, dizziness, CP, palpitations, SOB, N/V, numbness, paresthesias. Questions/concerns addressed.         Objective     Vital signs in last 24 hours:  Temp:  [34.4 °C (94 °F)-36.7 °C (98.1 °F)] 36.6 °C (97.9 °F)  Heart Rate:  [] 97  Resp:  [10-18] 18  BP: (125-200)/(76-97) 164/80    No intake or output data in the 24 hours ending 10/10/24 0621  Intake/Output this shift:  No intake/output data recorded.    Labs:            Microbiology Results       Procedure Component Value Units Date/Time    COVID-19 PAT/Pre-procedural [[id number]]  (Normal) Collected: 04/22/21 1206    Specimen: Nasopharyngeal Swab from Nasopharynx Updated: 04/23/21 0220    Narrative:      The following orders were created for panel order COVID-19 PAT/Pre-procedural.  Procedure                               Abnormality         Status                     ---------                               -----------         ------                     SARS-CoV-2 (COVID-19), PCR[[id number]]    Normal              Final result                 Please view results for these tests on the individual orders.    SARS-CoV-2 (COVID-19), PCR [[id number]]  (Normal) Collected: 04/22/21 1206    Specimen: Nasopharyngeal Swab from Nasopharynx Updated: 04/23/21 0220     SARS-CoV-2 (COVID-19) Negative              Imaging:  PO XR L knee: well aligned total knee prosthesis with post-surgical soft tissue changes present. No intraoperative fractures noted.      Physical Exam:  Gen: NAD, Cooperative  Respiratory: no Respiratory distress  MSK:  LLE  No obvious deformity. Dressing CDI  Motor intact quads/hamstrings/ehl/fhl/df/pf/peroneals  SILT sural/saphenous/spn/dpn/tibial n  Compartments soft and  compressible  Foot WWP, 2+DP    A/P:   74-year-old female status post left total knee arthroplasty with Dr. Chan on 10/9/2024    Ancef 24 hours  WBAT LLE  PT/OT  OOB  Ortho stable, DC pending PT progress and recs  DVT: ASA  Pain control    Donta Eric, DO  Orthopedic Surgery, PGY-2  [facility] Pager [phone]

## 2024-10-10 NOTE — PLAN OF CARE
Problem: Knee Arthroplasty  Goal: Optimal Functional Ability  Outcome: Progressing     Problem: Knee Arthroplasty  Goal: Optimal Functional Ability  Outcome: Progressing

## 2024-10-10 NOTE — PLAN OF CARE
Care Coordination Admission Assessment Note    General Information:  Readmission Within the last 30 days: no previous admission in last 30 days  Does patient have a :    Patient-Specific Goals (include timeframe): to go home once stable    Living Arrangements:  Arrived From: home  Current Living Arrangements: home  People in Home: alone  Home Accessibility: stairs to enter home (Group)  Living Arrangement Comments: lives on first-floor of [facility], rents out second floor, [address]    Housing Stability and Utility Access (SDOH):  In the last 12 months, was there a time when you were not able to pay the mortgage or rent on time?: No  In the past 12 months, how many times have you moved?:    At any time in the past 12 months, were you homeless or living in a shelter (including now)?: No  In the past 12 months has the electric, gas, oil, or water company threatened to shut off services in your home?: No    Functional Status Prior to Admission:   Assistive Device/Animal Currently Used at Home: wheelchair, glucometer, cane, straight  Functional Status Comments: has been using WC to get around recently  IADL Comments:       Supports and Services:  Current Outpatient/Agency/Support Group:    Type of Current Home Care Services:    History of home care episode or rehab stay: denies    Discharge Needs Assessment:   Concerns to be Addressed: denies needs/concerns at this time  Current Discharge Risk: lives alone  Anticipated Changes Related to Illness: inability to care for self    Patient/Family Anticipated Discharge Plan:  Patient/Family Anticipates Transition To: home with help/services  Patient/Family Anticipated Services at Transition: home health care    Connection to Community  Not applicable    Patient Choice:   Offered/Gave Vendor List: yes  Patient's Choice of Community Agency(s): [facility]  Patient and/or patient guardian/advocate was made aware of their right to choose a provider. A list of eligible  providers was presented and reviewed with the patient and/or patient guardian/advocate in written and/or verbal form. The list delineates providers in the patient’s desired geographic area who are participating in the Medicare program and/or providers contracted with the patient’s primary insurance. The Medicare list and quality ratings were obtained from the Medicare.gov [medicare.gov] website.    Anticipated Discharge Plan:  Met with patient. Provided education and contact information for Care Coordination services.: yes  Anticipated Discharge Disposition: home with home health  Type of Home Care Services: home PT, nursing    Transportation Needs (SDOH):  Transportation Concerns:    Transportation Anticipated: family or friend will provide  Is Out of Hospital DNR needed at discharge?:      In the past 12 months, has lack of transportation kept you from medical appointments or from getting medications?: No  In the past 12 months, has lack of transportation kept you from meetings, work, or from getting things needed for daily living?: No    Concerns - comments: Met with patient bedside to discuss potential d/c needs. Patient lives independently in single-level apartment, [address]. She has been using a WC to get around. Her plan is for home, her niece is visiting from [place], will stay for a few days and drive her home. From there, nephew lives nearby and can stop in. Offered choice of HHC, she is amenable to [facility], referral made.

## 2024-10-10 NOTE — PLAN OF CARE
Problem: Adult Inpatient Plan of Care  Goal: Plan of Care Review  Outcome: Progressing  Flowsheets (Taken 10/10/2024 0550)  Progress: improving  Outcome Evaluation: Pt OOB to BSC x1 assist with walker. Small BM and large void. Pain controlled. BG controlled with insulin. VSS. Resting comfortably at end of shift.  Plan of Care Reviewed With: patient  Goal: Patient-Specific Goal (Individualized)  Outcome: Progressing  Goal: Absence of Hospital-Acquired Illness or Injury  Outcome: Progressing  Goal: Optimal Comfort and Wellbeing  Outcome: Progressing  Goal: Readiness for Transition of Care  Outcome: Progressing   Plan of Care Review  Plan of Care Reviewed With: patient  Progress: improving  Outcome Evaluation: Pt OOB to BSC x1 assist with walker. Small BM and large void. Pain controlled. BG controlled with insulin. VSS. Resting comfortably at end of shift.

## 2024-10-10 NOTE — PLAN OF CARE
Problem: Self-Care Deficit  Goal: Improved Ability to Complete Activities of Daily Living  Outcome: Met

## 2024-10-10 NOTE — PROGRESS NOTES
Occupational Therapy -  Initial Evaluation     Patient: Fanny Reddy  Location: [place] [address]  MRN: [id number]  Today's date: 10/10/2024    HISTORY OF PRESENT ILLNESS     Fanny is a 74 y.o. female admitted on 10/9/2024 with Osteoarthritis of left knee, unspecified osteoarthritis type [M17.12]  Arthritis of left knee [M17.12]. Principal problem is No Principal Problem: There is no principal problem currently on the Problem List. Please update the Problem List and refresh..    Past Medical History  Fanny has a past medical history of Cutaneous abscess of neck (02/07/2019), GERD (gastroesophageal reflux disease), Hypertension, Irritable bowel syndrome with diarrhea, Lipid disorder, Meralgia paraesthetica, right, Seasonal allergies, and Type 2 diabetes mellitus (CMS/[facility]).    History of Present Illness   74-year-old female status post left total knee arthroplasty with Dr. Chan on 10/9/2024   PRIOR LEVEL OF FUNCTION AND LIVING ENVIRONMENT     Prior Level of Function      Flowsheet Row Most Recent Value   Dominant Hand right   Ambulation independent   Transferring independent   Toileting independent   Bathing independent   Dressing independent   Eating independent   IADLs independent   Prior Level of Function Comment ind no AD, ind ADLs   Assistive Device Currently Used at Home wheelchair, glucometer, cane, straight             Prior Living Environment      Flowsheet Row Most Recent Value   People in Home alone   Current Living Arrangements home   Home Accessibility stairs to enter home (Group)   Living Environment Comment [address]st floor [address], lives alone (siblings to stay upon d/c)            VITALS AND PAIN     OT Vitals      Date/Time Pulse HR Source BP BP Location BP Method Pt Position Who   10/10/24 0823 91 Monitor 126/75 Right upper arm Automatic Lying JLD   10/10/24 0906 -- -- 136/63 Right upper arm Automatic Sitting JLD          OT Pain      Date/Time Pain Type  Side/Orientation Location Rating: Rest Rating: Activity Interventions [facility]   10/10/24 0823 Pain Assessment knee left 8 - severe pain 8 - severe pain cold applied JLD             Objective   OBJECTIVE     Start time:  0823  End time:  0908  Session Length: 45 min  Mode of Treatment: co-treatment  Reason for co-treatment: d/c recs    General Observations  Patient received reclined, in bed. She was agreeable to therapy.      Precautions: fall, weight bearing  Extremity Weight-Bearing Status: left lower extremity  Left LE Weight-Bearing Status: weight-bearing as tolerated (WBAT)    Limitations/Impairments: safety/cognitive   Services  Do You Speak a Language Other Than English at Home?: no  Is an  Needed/Used?: No      OT Eval and Treat - 10/10/24 0823          Cognition    Orientation Status oriented x 4     Affect/Mental Status WFL     Follows Commands WFL     Cognitive Function WFL        Vision Assessment/Intervention    Vision Assessment WFL        Hearing Assessment    Hearing Status WFL        Sensory Assessment    Sensory Assessment sensation intact, upper extremities        Upper Extremity Assessment    UE Assessment ROM and Strength WFL        Bed Mobility    Bed Mobility Activities supine to sit;right     [place] distant supervision     Safety/Cues verbal cues;technique     Assistive Device bed rails        Mobility Belt    Mobility Belt Used During Session no - patient refused        Sit/Stand Transfer    Surface edge of bed;chair with arm rests     [place] distant supervision     Safety/Cues verbal cues;hand placement     Assistive Device walker, front-wheeled        Toilet Transfer    Transfer Technique sit/stand     [place] modified independence     Assistive Device commode, 3-in-1        Functional Mobility    Distance in room/bathroom;hallway     Functional Mobility [place] supervision     Safety/Cues verbal cues     Assistive Device walker, front-wheeled         Upper Body Dressing    Tasks don;pajama/robe     [place] modified independence     Position edge of bed sitting     Adaptive Equipment none        Lower Body Dressing    Tasks don;socks     [place] supervision     Safety/Cues increased time to complete     Position edge of bed sitting     Comment pt edu on comp techs        Grooming    Tasks washes, rinses and dries hands     [place] modified independence     Position sink side     Adaptive Equipment none        Toileting    Tasks perform bladder hygiene     [place] modified independence     Position unsupported sitting     Adaptive Equipment none        Balance    Static Sitting Balance WFL;unsupported;sitting, edge of bed        Impairments/Safety Issues    Impairments Affecting Function pain;endurance/activity tolerance;strength;range of motion (ROM)                                    Education Documentation  Self-Care, taught by Yamila Hunter OT at 10/10/2024 11:16 AM.  Learner: Patient  Readiness: Acceptance  Method: Explanation  Response: Verbalizes Understanding  Comment: role of OT, ADLs, safe transfers        Session Outcome  Patient in chair, upright, reclined at end of session, personal items in reach, all needs met, call light in reach. Nursing notified about patient's performance, patient's position, and patient's response to therapy/activity.    AM-PAC™ - ADL (Current Function)     Putting on/taking off regular lower body clothing 3 - A Little   Bathing 3 - A Little   Toileting 4 - None   Putting on/taking off regular upper body clothing 4 - None   Help for taking care of personal grooming 4 - None   Eating meals 4 - None   AM-PAC™ ADL Score 22      ASSESSMENT AND PLAN     Progress Summary  OT Eval, pt dist sup supine>sit, dist sup sit<>stand, mod ind toilet transfer from commode (pt has raised seat c grab bars), sup func mob c RW, mod ind UB dressing, sup LB dressing, mod ind grooming; pt edu on compensatory techs, no further  skilled OT needs, d/c OT services, rec home upon d/c    Patient/Family Therapy Goal Statement: to go home    OT Plan      Flowsheet Row Most Recent Value   Therapy Frequency evaluation only at 10/10/2024 0823            OT Discharge Recommendations      Flowsheet Row Most Recent Value   OT Recommended Discharge Disposition home at 10/10/2024 0823   Anticipated Equipment Needs if Discharged Home (OT) walker, front-wheeled at 10/10/2024 0823

## 2024-10-10 NOTE — PROGRESS NOTES
Physical Therapy -  Initial Evaluation     Patient: Fanny Reddy  Location: [place] [address]  MRN: [id number]  Today's date: 10/10/2024    HISTORY OF PRESENT ILLNESS     Fanny is a 74 y.o. female admitted on 10/9/2024 with Osteoarthritis of left knee, unspecified osteoarthritis type [M17.12]  Arthritis of left knee [M17.12]. Principal problem is No Principal Problem: There is no principal problem currently on the Problem List. Please update the Problem List and refresh..    Past Medical History  Fanny has a past medical history of Cutaneous abscess of neck (02/07/2019), GERD (gastroesophageal reflux disease), Hypertension, Irritable bowel syndrome with diarrhea, Lipid disorder, Meralgia paraesthetica, right, Seasonal allergies, and Type 2 diabetes mellitus (CMS/[facility]).    History of Present Illness   74-year-old female status post left total knee arthroplasty with Dr. Chan on 10/9/2024   PRIOR LEVEL OF FUNCTION AND LIVING ENVIRONMENT     Prior Level of Function      Flowsheet Row Most Recent Value   Dominant Hand right   Ambulation independent   Transferring independent   Toileting independent   Bathing independent   Dressing independent   Eating independent   IADLs independent   Prior Level of Function Comment ind no AD, ind ADLs   Assistive Device Currently Used at Home wheelchair, glucometer, cane, straight             Prior Living Environment      Flowsheet Row Most Recent Value   People in Home alone   Current Living Arrangements home   Home Accessibility stairs to enter home (Group)   Living Environment Comment [address]st floor duplex [address], lives alone (siblings to stay upon d/c)          VITALS AND PAIN     PT Vitals      Date/Time Pulse HR Source BP BP Location BP Method Pt Position [facility]   10/10/24 0823 91 Monitor 126/75 Right upper arm Automatic Lying JLD   10/10/24 0906 -- -- 136/63 Right upper arm Automatic Sitting JLD          PT Pain      Date/Time Pain Type Side/Orientation  Location Rating: Rest Rating: Activity Interventions [facility]   10/10/24 0823 Pain Assessment left knee 8 - severe pain 8 - severe pain cold applied JLD             Objective   OBJECTIVE     Start time:  0822  End time:  0908  Session Length: 46 min  Mode of Treatment: co-treatment  Reason for co-treatment: co rx 2* to reducing fall risk.    General Observations  Patient received supine. She was agreeable to therapy, no issues or concerns identified by nurse prior to session. niece present for session.    Precautions: fall, weight bearing  Extremity Weight-Bearing Status: left lower extremity  Left LE Weight-Bearing Status: weight-bearing as tolerated (WBAT)           PT Eval and Treat - 10/10/24 0822          Cognition    Orientation Status oriented x 4     Affect/Mental Status WFL        Vision Assessment/Intervention    Vision Assessment WFL        Hearing Assessment    Hearing Status WFL        Sensory Assessment    Sensory Assessment sensation intact, lower extremities        Lower Extremity Assessment    LE Assessment ROM and Strength WFL        Lower Extremity Range of Motion    Knee, Left (ROM) 5-78        Bed Mobility    Bed Mobility Activities supine to sit     [place] supervision     Safety/Cues technique;sequencing     Assistive Device bed rails;head of bed elevated        Mobility Belt    Mobility Belt Used During Session no - other (see comments)        Sit/Stand Transfer    Surface edge of bed     [place] supervision     Safety/Cues increased time to complete;verbal cues;sequencing;technique;hand placement     Assistive Device walker, front-wheeled        Gait Training    [place], Gait supervision     Safety/Cues increased time to complete     Assistive Device walker, front-wheeled     Distance in Feet 80 feet     Pattern step-to     Deviations/Abnormal Patterns marie decreased;gait speed decreased;step length decreased;stride length decreased        Stairs Training    [place], Stairs  minimum assist (75% or more patient effort);1 person assist     Safety/Cues increased time to complete;verbal cues;technique;sequencing     Assistive Device railing     Handrail Location (Stairs) left side (ascending);right side (descending)     Number of Stairs 1     Ascending Stairs Technique step-to-step     Descending Stairs Technique step-to-step        Balance    Static Sitting Balance WNL     Dynamic Sitting Balance WNL     Sit to Stand Dynamic Balance WFL     Static Standing Balance WFL     Dynamic Standing Balance mild impairment        Impairments/Safety Issues    Impairments Affecting Function pain;endurance/activity tolerance;strength;range of motion (ROM)                      10 Meter Walk Test (Self-Selected Velocity)  Trial One: Ten Meter Walk Test (sec): 0 seconds  Trial Two: Ten Meter Walk Test (sec): 0 seconds  Trial Three: Ten Meter Walk Test (sec): 0 seconds             Education Documentation  Call Light Use, taught by Thuan Farr, PT at 10/10/2024  1:32 PM.  Learner: Patient  Readiness: Eager  Method: Explanation  Response: Verbalizes Understanding  Comment: instructed on use of RN remote.        Session Outcome  Patient in chair at end of session, all needs met, call light in reach, personal items in reach. Nursing notified about patient's performance, patient's position, and patient's response to therapy/activity.    AM-PAC™ - Mobility (Current Function)     Turning form your back to your side while in flat bed without using bedrails 3 - A Little   Moving from lying on your back to sitting on the side of a flat bed without using bedrails 3 - A Little   Moving to and from a bed to a chair 3 - A Little   Standing up from a chair using your arms 3 - A Little   To walk in a hospital room 3 - A Little   Climbing 3-5 steps with a railing 3 - A Little   AM-PAC™ Mobility Score 18      ASSESSMENT AND PLAN     Progress Summary  Pt managing bed mobility and transfers with vc's for sequencing and  technique. Amb a room to room type distance, but unable to complete stairs safely. Will benefit from ongoing pain control.    Patient/Family Therapy Goals Statement: improve mobility    PT Plan      Flowsheet Row Most Recent Value   Rehab Potential good, to achieve stated therapy goals at 10/10/2024 0822   Therapy Frequency 5 times/wk at 10/10/2024 0822   Planned Therapy Interventions balance training, bed mobility training, gait training, home exercise program, patient/family education, stair training, transfer training at 10/10/2024 0822            PT Discharge Recommendations      Flowsheet Row Most Recent Value   PT Recommended Discharge Disposition home at 10/10/2024 0822   Anticipated Equipment Needs if Discharged Home (PT) none at 10/10/2024 0822                 PT Goals      Flowsheet Row Most Recent Value   Bed Mobility Goal 1    Activity/Assistive Device bed mobility activities, all at 10/10/2024 0822   [place] modified independence at 10/10/2024 0822   Time Frame 2 days at 10/10/2024 0822   Progress/Outcome goal ongoing at 10/10/2024 0822   Transfer Goal 1    Activity/Assistive Device all transfers at 10/10/2024 0822   [place] modified independence at 10/10/2024 0822   Time Frame 2 days at 10/10/2024 0822   Progress/Outcome goal ongoing at 10/10/2024 0822   Gait Training Goal 1    Activity/Assistive Device gait (walking locomotion) at 10/10/2024 0822   [place] modified independence at 10/10/2024 0822   Time Frame 2 days at 10/10/2024 0822   Progress/Outcome goal ongoing at 10/10/2024 0822   Stairs Goal 1    Activity/Assistive Device stairs, all skills at 10/10/2024 0822   [place] supervision required at 10/10/2024 0822   Number of Stairs 13 at 10/10/2024 0822   Time Frame 2 days at 10/10/2024 0822   Progress/Outcome goal ongoing at 10/10/2024 0822

## 2024-10-11 VITALS
BODY MASS INDEX: 37.31 KG/M2 | HEART RATE: 108 BPM | RESPIRATION RATE: 18 BRPM | WEIGHT: 204 LBS | DIASTOLIC BLOOD PRESSURE: 66 MMHG | TEMPERATURE: 98.4 F | SYSTOLIC BLOOD PRESSURE: 138 MMHG | OXYGEN SATURATION: 98 %

## 2024-10-11 LAB
GLUCOSE BLD-MCNC: 125 MG/DL (ref 70–99)
GLUCOSE BLD-MCNC: 158 MG/DL (ref 70–99)
POCT TEST: ABNORMAL
POCT TEST: ABNORMAL

## 2024-10-11 PROCEDURE — 63600000 HC DRUGS/DETAIL CODE: Performed by: PHYSICIAN ASSISTANT

## 2024-10-11 PROCEDURE — 63700000 HC SELF-ADMINISTRABLE DRUG: Performed by: PHYSICIAN ASSISTANT

## 2024-10-11 PROCEDURE — 97116 GAIT TRAINING THERAPY: CPT | Mod: GP,CQ

## 2024-10-11 PROCEDURE — 97530 THERAPEUTIC ACTIVITIES: CPT | Mod: GP

## 2024-10-11 RX ORDER — ACETAMINOPHEN 500 MG
500 TABLET ORAL EVERY 6 HOURS PRN
Start: 2024-10-11 | End: 2024-11-10

## 2024-10-11 RX ORDER — AMOXICILLIN 250 MG
1 CAPSULE ORAL 2 TIMES DAILY PRN
Start: 2024-10-11 | End: 2024-11-22

## 2024-10-11 RX ORDER — ASPIRIN 81 MG/1
81 TABLET ORAL DAILY
Start: 2024-10-11 | End: 2024-11-10

## 2024-10-11 RX ORDER — ONDANSETRON 4 MG/1
4 TABLET, ORALLY DISINTEGRATING ORAL EVERY 8 HOURS PRN
Qty: 15 TABLET | Refills: 0 | Status: SHIPPED | OUTPATIENT
Start: 2024-10-11 | End: 2024-10-18

## 2024-10-11 RX ORDER — POLYETHYLENE GLYCOL 3350 17 G/17G
17 POWDER, FOR SOLUTION ORAL DAILY PRN
Start: 2024-10-11 | End: 2024-11-22

## 2024-10-11 RX ORDER — GUAIFENESIN 1200 MG
975 TABLET, EXTENDED RELEASE 12 HR ORAL EVERY 8 HOURS
Status: CANCELLED
Start: 2024-10-11 | End: 2024-10-25

## 2024-10-11 RX ORDER — ASPIRIN 325 MG
325 TABLET ORAL 2 TIMES DAILY
Start: 2024-10-11 | End: 2024-11-10

## 2024-10-11 RX ORDER — HYDROCODONE BITARTRATE AND ACETAMINOPHEN 5; 325 MG/1; MG/1
1-2 TABLET ORAL EVERY 6 HOURS PRN
Qty: 30 TABLET | Refills: 0 | Status: SHIPPED | OUTPATIENT
Start: 2024-10-11 | End: 2024-10-16

## 2024-10-11 RX ADMIN — KETOROLAC TROMETHAMINE 7.5 MG: 15 INJECTION, SOLUTION INTRAMUSCULAR; INTRAVENOUS at 11:41

## 2024-10-11 RX ADMIN — ONDANSETRON 4 MG: 4 TABLET, ORALLY DISINTEGRATING ORAL at 12:41

## 2024-10-11 RX ADMIN — METFORMIN HYDROCHLORIDE 1000 MG: 500 TABLET ORAL at 08:29

## 2024-10-11 RX ADMIN — HYDROCODONE BITARTRATE AND ACETAMINOPHEN 1 TABLET: 5; 325 TABLET ORAL at 06:12

## 2024-10-11 RX ADMIN — EMPAGLIFLOZIN 10 MG: 10 TABLET, FILM COATED ORAL at 09:09

## 2024-10-11 RX ADMIN — ASPIRIN 325 MG: 325 TABLET ORAL at 09:08

## 2024-10-11 RX ADMIN — KETOROLAC TROMETHAMINE 7.5 MG: 15 INJECTION, SOLUTION INTRAMUSCULAR; INTRAVENOUS at 05:00

## 2024-10-11 RX ADMIN — SENNOSIDES AND DOCUSATE SODIUM 1 TABLET: 8.6; 5 TABLET ORAL at 09:08

## 2024-10-11 RX ADMIN — PANTOPRAZOLE SODIUM 40 MG: 40 TABLET, DELAYED RELEASE ORAL at 08:29

## 2024-10-11 ASSESSMENT — COGNITIVE AND FUNCTIONAL STATUS - GENERAL
WALKING IN HOSPITAL ROOM: 3 - A LITTLE
CLIMB 3 TO 5 STEPS WITH RAILING: 3 - A LITTLE
AFFECT: WFL
STANDING UP FROM CHAIR USING ARMS: 3 - A LITTLE
CLIMB 3 TO 5 STEPS WITH RAILING: 2 - A LOT
WALKING IN HOSPITAL ROOM: 3 - A LITTLE
AFFECT: WFL
MOVING TO AND FROM BED TO CHAIR: 3 - A LITTLE
STANDING UP FROM CHAIR USING ARMS: 3 - A LITTLE
MOVING TO AND FROM BED TO CHAIR: 3 - A LITTLE

## 2024-10-11 NOTE — PLAN OF CARE
Problem: Adult Inpatient Plan of Care  Goal: Plan of Care Review  Outcome: Progressing  Flowsheets (Taken 10/11/2024 0653)  Progress: improving  Outcome Evaluation: Pt OOB to BR x2. Pain controlled. VSS. Dressing CDI. Ready for discharge.  Plan of Care Reviewed With: patient  Goal: Patient-Specific Goal (Individualized)  Outcome: Progressing  Goal: Absence of Hospital-Acquired Illness or Injury  Outcome: Progressing  Goal: Optimal Comfort and Wellbeing  Outcome: Progressing  Goal: Readiness for Transition of Care  Outcome: Progressing   Plan of Care Review  Plan of Care Reviewed With: patient  Progress: improving  Outcome Evaluation: Pt OOB to BR x2. Pain controlled. VSS. Dressing CDI. Ready for discharge.

## 2024-10-11 NOTE — PROGRESS NOTES
Physical Therapy -  Daily Treatment/Progress Note     Patient: Fanny Rdedy  Location: [place] [address]  MRN: [id number]  Today's date: 10/11/2024    HISTORY OF PRESENT ILLNESS     Fanny is a 74 y.o. female admitted on 10/9/2024 with Osteoarthritis of left knee, unspecified osteoarthritis type [M17.12]  Arthritis of left knee [M17.12]. Principal problem is No Principal Problem: There is no principal problem currently on the Problem List. Please update the Problem List and refresh..    Past Medical History  Fanny has a past medical history of Cutaneous abscess of neck (02/07/2019), GERD (gastroesophageal reflux disease), Hypertension, Irritable bowel syndrome with diarrhea, Lipid disorder, Meralgia paraesthetica, right, Seasonal allergies, and Type 2 diabetes mellitus (CMS/[facility]).    History of Present Illness   74-year-old female status post left total knee arthroplasty with Dr. Chan on 10/9/2024   PRIOR LEVEL OF FUNCTION AND LIVING ENVIRONMENT     Prior Level of Function      Flowsheet Row Most Recent Value   Dominant Hand right   Ambulation independent   Transferring independent   Toileting independent   Bathing independent   Dressing independent   Eating independent   IADLs independent   Prior Level of Function Comment ind no AD, ind ADLs   Assistive Device Currently Used at Home wheelchair, glucometer, cane, straight             Prior Living Environment      Flowsheet Row Most Recent Value   People in Home alone   Current Living Arrangements home   Home Accessibility stairs to enter home (Group)   Living Environment Comment [address]st floor duplex [address], lives alone (siblings to stay upon d/c)          VITALS AND PAIN        Objective   OBJECTIVE     Start time:  1420  End time:  1445  Session Length: 25 min       General Observations  Patient received supine. She was agreeable to therapy, no issues or concerns identified by nurse prior to session. nausea improved . has prescription  for help    Precautions: fall, weight bearing  Extremity Weight-Bearing Status: left lower extremity  Left LE Weight-Bearing Status: weight-bearing as tolerated (WBAT)    Limitations/Impairments: safety/cognitive      PT Eval and Treat - 10/11/24 1443          Cognition    Orientation Status oriented x 4     Affect/Mental Status WFL     Cognitive Function WFL     Comment, Cognition feeling better        Bed Mobility    Bed Mobility Activities supine to sit;sit to supine     [place] supervision     Safety/Cues increased time to complete     Comment using leg  . reccomended leg  and bed rail for home        Sit/Stand Transfer    Surface chair with arm rests;edge of bed     [place] distant supervision     Safety/Cues increased time to complete;preparatory posture     Assistive Device walker, front-wheeled        Gait Training    [place], Gait close supervision     Safety/Cues increased time to complete;proper use of assistive device;sequencing     Assistive Device walker, front-wheeled     Distance in Feet 60 feet     Pattern step-to     Deviations/Abnormal Patterns bilateral deviations;antalgic     Bilateral Gait Deviations heel strike decreased        Stairs Training    [place], Stairs supervision     Safety/Cues increased time to complete;technique     Assistive Device railing     Handrail Location (Stairs) left side (ascending);right side (descending)     Number of Stairs 4     Ascending Stairs Technique step-to-step     Descending Stairs Technique step-to-step     Comment able to complete . trial with B hands on hr and and rail and SPC        Balance    Static Sitting Balance WNL     Dynamic Sitting Balance WNL     Sit to Stand Dynamic Balance WNL     Static Standing Balance WNL     Dynamic Standing Balance WFL        Impairments/Safety Issues    Impairments Affecting Function pain;endurance/activity tolerance;strength;range of motion (ROM)     Functional Endurance improved mobility  with less nausea     Safety Issues Affecting Function safety precautions follow-through/compliance                                       Session Outcome  Patient reclined, in bed at end of session, call light in reach. Nursing notified about change in vital signs and patient's response to therapy/activity.    AM-PAC™ - Mobility (Current Function)     Turning form your back to your side while in flat bed without using bedrails 4 - None   Moving from lying on your back to sitting on the side of a flat bed without using bedrails 3 - A Little   Moving to and from a bed to a chair 3 - A Little   Standing up from a chair using your arms 3 - A Little   To walk in a hospital room 3 - A Little   Climbing 3-5 steps with a railing 3 - A Little   AM-PAC™ Mobility Score 19      ASSESSMENT AND PLAN     Progress Summary  was able to complete steps this pm now safe for dc to home    Patient/Family Therapy Goals Statement: I just want to get moving    PT Plan      Flowsheet Row Most Recent Value   Rehab Potential good, to achieve stated therapy goals at 10/11/2024 1321   Therapy Frequency 5 times/wk at 10/11/2024 1321   Planned Therapy Interventions balance training, bed mobility training, gait training, home exercise program, patient/family education, stair training, transfer training at 10/10/2024 0822            PT Discharge Recommendations      Flowsheet Row Most Recent Value   PT Recommended Discharge Disposition home with assistance, home at 10/11/2024 1443   Anticipated Equipment Needs if Discharged Home (PT) none at 10/11/2024 1443            Therapy Durable Medical Equipment  Vendor: [employer]  Equipment Provided: Walker, with Wheels, Adult     PT Goals      Flowsheet Row Most Recent Value   Bed Mobility Goal 1    Activity/Assistive Device bed mobility activities, all at 10/10/2024 0822   [place] modified independence at 10/10/2024 0822   Time Frame 2 days at 10/10/2024 0822    Progress/Outcome goal ongoing at 10/10/2024 0822   Transfer Goal 1    Activity/Assistive Device all transfers at 10/10/2024 0822   [place] modified independence at 10/10/2024 0822   Time Frame 2 days at 10/10/2024 0822   Progress/Outcome goal ongoing at 10/10/2024 0822   Gait Training Goal 1    Activity/Assistive Device gait (walking locomotion) at 10/10/2024 0822   [place] modified independence at 10/10/2024 0822   Time Frame 2 days at 10/10/2024 0822   Progress/Outcome goal ongoing at 10/10/2024 0822   Stairs Goal 1    Activity/Assistive Device stairs, all skills at 10/10/2024 0822   [place] supervision required at 10/10/2024 0822   Number of Stairs 13 at 10/10/2024 0822   Time Frame 2 days at 10/10/2024 0822   Progress/Outcome goal ongoing at 10/10/2024 0822

## 2024-10-11 NOTE — PROGRESS NOTES
[facility] received referral. Reviewed case. Spoke with patient. Referred patient to [facility] for RN and PT with anticipated discharge within the next 24 hours. Will continue to follow. Thank you

## 2024-10-11 NOTE — PROGRESS NOTES
Ortho Daily Progress Note    Subjective     POD 2: Examined at bedside, resting comfortably. NAEON. Pain controlled. Has been OOB. PT/OT 80 feet with pt but could not do stairs. No new complaints. AVSS. Dressings CDI. NVI distally. Denies fevers, HA, dizziness, CP, palpitations, SOB, N/V, numbness, paresthesias. Questions/concerns addressed.           Objective     Vital signs in last 24 hours:  Temp:  [36.4 °C (97.6 °F)-37.4 °C (99.4 °F)] 37.2 °C (98.9 °F)  Heart Rate:  [88-97] 97  Resp:  [16-18] 18  BP: (110-146)/(63-75) 135/69      Intake/Output Summary (Last 24 hours) at 10/11/2024 0621  Last data filed at 10/10/2024 1200  Gross per 24 hour   Intake 500 ml   Output --   Net 500 ml     Intake/Output this shift:  No intake/output data recorded.    Labs:            Microbiology Results       Procedure Component Value Units Date/Time    COVID-19 PAT/Pre-procedural [[id number]]  (Normal) Collected: 04/22/21 1206    Specimen: Nasopharyngeal Swab from Nasopharynx Updated: 04/23/21 0220    Narrative:      The following orders were created for panel order COVID-19 PAT/Pre-procedural.  Procedure                               Abnormality         Status                     ---------                               -----------         ------                     SARS-CoV-2 (COVID-19), PCR[[id number]]    Normal              Final result                 Please view results for these tests on the individual orders.    SARS-CoV-2 (COVID-19), PCR [[id number]]  (Normal) Collected: 04/22/21 1206    Specimen: Nasopharyngeal Swab from Nasopharynx Updated: 04/23/21 0220     SARS-CoV-2 (COVID-19) Negative              Imaging:  PO XR L knee: well aligned total knee prosthesis with post-surgical soft tissue changes present. No intraoperative fractures noted.      Physical Exam:  Gen: NAD, Cooperative  Respiratory: no Respiratory distress  MSK:  LLE  No obvious deformity. Dressing CDI  Motor intact quads/hamstrings/ehl/fhl/df/pf/peroneals  SILT  sural/saphenous/spn/dpn/tibial n  Compartments soft and compressible  Foot WWP, 2+DP    Assessment  74-year-old female status post left total knee arthroplasty with Dr. Chan on 10/9/2024    Plan:  WBAT LLE  PT/OT  OOB  Ortho stable, DC pending PT progress and recs  DVT: ASA  Pain control    Donta Eric, DO  Orthopedic Surgery, PGY-2  [facility] Pager [phone]

## 2024-10-11 NOTE — DISCHARGE SUMMARY
Ortho Discharge Summary    Admitting Provider: Kit Chan DO  Discharge Provider: Kit Chan DO  Primary Care Physician at Discharge: Lupe Mendez [phone]     Admission Date: 10/9/2024     Discharge Date: 10/11/2024    Primary Discharge Diagnosis  No Principal Problem: There is no principal problem currently on the Problem List. Please update the Problem List and refresh.    Discharge Disposition  [facility]  Code Status at Discharge: Full Code    Discharge Medications     Medication List        START taking these medications      acetaminophen 500 mg tablet  Commonly known as: TYLENOL EXTRA STRENGTH  Take 1 tablet (500 mg total) by mouth every 6 (six) hours as needed for mild pain or fever (specify temp in comments):. Please obtain over the counter. Not to exceed 3,000 mg of tylenol in a 24 hour period.  Dose: 500 mg     HYDROcodone-acetaminophen 5-325 mg per tablet  Commonly known as: NORCO  Take 1-2 tablets by mouth every 6 (six) hours as needed for severe pain for up to 5 days.  Dose: 1-2 tablet     ondansetron ODT 4 mg disintegrating tablet  Commonly known as: ZOFRAN-ODT  Take 1 tablet (4 mg total) by mouth every 8 (eight) hours as needed for nausea or vomiting (Nausea/Vomiting) for up to 7 days.  Dose: 4 mg     polyethylene glycol 17 gram packet  Commonly known as: MIRALAX  Take 17 g by mouth daily as needed (constipation). Please obtain over the counter  Dose: 17 g     sennosides-docusate sodium 8.6-50 mg  Commonly known as: SENOKOT-S  Take 1 tablet by mouth 2 (two) times a day as needed for constipation. Please obtain over the counter  Dose: 1 tablet            CHANGE how you take these medications      * aspirin 81 mg enteric coated tablet  Commonly known as: ASPIR-LOW  Take 1 tablet (81 mg total) by mouth daily. Hold medication for 6 weeks while taking Aspirin 325 mg twice a day.  May resume this dose 11/21/2024  Dose: 81 mg  What changed: additional  instructions     * aspirin 325 mg tablet  Take 1 tablet (325 mg total) by mouth 2 (two) times a day. Take Asprin for 6 weeks. Please obtain over the counter.  Dose: 325 mg  What changed: You were already taking a medication with the same name, and this prescription was added. Make sure you understand how and when to take each.           * This list has 2 medication(s) that are the same as other medications prescribed for you. Read the directions carefully, and ask your doctor or other care provider to review them with you.                CONTINUE taking these medications      CONTOUR NEXT TEST STRIPS strip  Test twice daily  Generic drug: blood sugar diagnostic     fluticasone propionate 50 mcg/actuation nasal spray  Commonly known as: FLONASE  Administer 1 spray into each nostril daily.  Dose: 1 spray     * JARDIANCE 10 mg tablet  TAKE 1 TABLET BY MOUTH EVERY DAY  Dose: 10 mg  Generic drug: empagliflozin     * empagliflozin 10 mg tablet  Commonly known as: JARDIANCE  Take 1 tablet (10 mg total) by mouth daily.  Dose: 10 mg     * losartan-hydrochlorothiazide 50-12.5 mg per tablet  Commonly known as: HYZAAR  TAKE 1 TABLET BY MOUTH EVERY DAY  Dose: 1 tablet     * losartan-hydrochlorothiazide 50-12.5 mg per tablet  Commonly known as: HYZAAR  Take 1 tablet by mouth daily.  Dose: 1 tablet     metFORMIN 1,000 mg tablet  Commonly known as: GLUCOPHAGE  Take 1 tablet (1,000 mg total) by mouth 2 (two) times a day with meals.  Dose: 1,000 mg     * omeprazole 40 mg capsule  Commonly known as: PriLOSEC  TAKE 1 CAPSULE (40 MG TOTAL) BY MOUTH DAILY.  Dose: 40 mg     * omeprazole 40 mg capsule  Commonly known as: PriLOSEC  Take 1 capsule (40 mg total) by mouth daily.  Dose: 40 mg     pregabalin 75 mg capsule  Commonly known as: LYRICA  Take 75 mg by mouth 2 (two) times a day.  Dose: 75 mg     * rosuvastatin 20 mg tablet  Commonly known as: CRESTOR  Take 1 tablet (20 mg total) by mouth daily.  Dose: 20 mg     * rosuvastatin 20 mg  tablet  Commonly known as: CRESTOR  Take 1 tablet (20 mg total) by mouth daily.  Dose: 20 mg     traZODone 50 mg tablet  Commonly known as: DESYREL  TAKE 1 SPLIT TABLET (25 MG TOTAL) BY MOUTH NIGHTLY.     valACYclovir 500 mg tablet  Commonly known as: VALTREX  Take 1 tablet (500 mg total) by mouth daily.  Dose: 500 mg           * This list has 8 medication(s) that are the same as other medications prescribed for you. Read the directions carefully, and ask your doctor or other care provider to review them with you.                STOP taking these medications      oxyCODONE 5 mg immediate release tablet  Commonly known as: ROXICODONE            Outpatient Follow-Up            In 2 months Jorge Melgar DO [facility]              Test Results Pending at Discharge  Unresulted Labs (From admission, onward)      None            DETAILS OF HOSPITAL STAY    Presenting Problem/History of Present Illness  Osteoarthritis of left knee, unspecified osteoarthritis type [M17.12]  Arthritis of left knee [M17.12]    Hospital Course  DISCHARGE SUMMARY    The patient is a 74 y.o. female who presented with a history of severe knee pain.  Dr. Chan recommended Procedure(s):  Left Knee Arthroplasty. The patient underwent the procedure on 10/9/2024 and tolerated the procedure well. Details of the procedure can be found in the operative summary. The patient’s post-operative course was  remarkable for post-operative nausea and vomiting . The patient also experienced dizziness and lightheadedness with ambulation. Lyrica and oxycodone were discontinued.  The patient was put on Norco for pain management with resolution. The patient was seen postoperatively by PT, OT, and social work and progressed to the point that on the day of discharge was tolerating a house diet, voiding without difficulty and ambulating without assistance. At the time of discharge the patient’s vital signs were stable with a clean, dry and  intact incision, and nausea and vomiting had resolved. The patient was discharged on Asprin 325 mg po BID for 6 weeks for DVT prophylaxis, continue pain medications, resume preoperative medications and to follow up with their surgeon. The patient was advised to call the office with any problems including drainage from the incision, redness around the incision, worsening pain and fever greater than 101 degrees F.      Operative Procedures Performed  Procedure(s):  Left Knee Arthroplasty

## 2024-10-11 NOTE — PROGRESS NOTES
Physical Therapy -  Daily Treatment/Progress Note     Patient: Fanny Reddy  Location: [place] [address]  MRN: [id number]  Today's date: 10/11/2024    HISTORY OF PRESENT ILLNESS     Fanny is a 74 y.o. female admitted on 10/9/2024 with Osteoarthritis of left knee, unspecified osteoarthritis type [M17.12]  Arthritis of left knee [M17.12]. Principal problem is No Principal Problem: There is no principal problem currently on the Problem List. Please update the Problem List and refresh..    Past Medical History  Fanny has a past medical history of Cutaneous abscess of neck (02/07/2019), GERD (gastroesophageal reflux disease), Hypertension, Irritable bowel syndrome with diarrhea, Lipid disorder, Meralgia paraesthetica, right, Seasonal allergies, and Type 2 diabetes mellitus (CMS/[facility]).    History of Present Illness   74-year-old female status post left total knee arthroplasty with Dr. Chan on 10/9/2024   PRIOR LEVEL OF FUNCTION AND LIVING ENVIRONMENT     Prior Level of Function      Flowsheet Row Most Recent Value   Dominant Hand right   Ambulation independent   Transferring independent   Toileting independent   Bathing independent   Dressing independent   Eating independent   IADLs independent   Prior Level of Function Comment ind no AD, ind ADLs   Assistive Device Currently Used at Home wheelchair, glucometer, cane, straight             Prior Living Environment      Flowsheet Row Most Recent Value   People in Home alone   Current Living Arrangements home   Home Accessibility stairs to enter home (Group)   Living Environment Comment [address]st floor duplex [address], lives alone (siblings to stay upon d/c)          VITALS AND PAIN     PT Vitals      Date/Time Pulse SpO2 BP BP Location Pt Position Who   10/11/24 1220 103 99 % 135/74 Right upper arm Sitting [place]             Objective   OBJECTIVE     Start time:  1213  End time:  1238  Session Length: 25 min       General Observations  Patient  received upright. She was agreeable to therapy. niece and sister present for therapy    Precautions: fall, weight bearing  Extremity Weight-Bearing Status: left lower extremity  Left LE Weight-Bearing Status: weight-bearing as tolerated (WBAT)    Limitations/Impairments: safety/cognitive      PT Eval and Treat - 10/11/24 1321          Cognition    Orientation Status oriented x 4     Affect/Mental Status WFL     Behavioral Issues difficulty managing stress;overwhelmed easily     Follows Commands WFL     Cognitive Function WFL        Sit/Stand Transfer    Surface edge of bed;chair with arm rests     [place] distant supervision     Safety/Cues verbal cues;hand placement     Assistive Device walker, front-wheeled        Gait Training    [place], Gait distant supervision;1 person assist     Safety/Cues increased time to complete     Assistive Device walker, front-wheeled     Distance in Feet 80 feet     Pattern step-to     Deviations/Abnormal Patterns antalgic     Comment co of feeling hot and nausea        Stairs Training    [place], Stairs minimum assist (75% or more patient effort);1 person assist     Safety/Cues increased time to complete;verbal cues;proper use of assistive device     Assistive Device railing     Handrail Location (Stairs) left side (ascending)     Number of Stairs 1     Ascending Stairs Technique step-to-step     Comment pt hysterically crying at steps stating she cant do it . stopped trial after one step        Balance    Static Sitting Balance WFL     Dynamic Sitting Balance WNL     Sit to Stand Dynamic Balance WFL     Static Standing Balance WFL     Dynamic Standing Balance mild impairment     Comment, Balance benefits from walker        Impairments/Safety Issues    Impairments Affecting Function pain;endurance/activity tolerance;strength;range of motion (ROM)   nausea    Functional Endurance limited by pain and nausea . alittle self limiting     Safety Issues Affecting Function  safety precautions follow-through/compliance                                       Session Outcome  Patient in chair, upright at end of session, call light in reach. Nursing notified about change in vital signs, patient's position, and patient's response to therapy/activity.    AM-PAC™ - Mobility (Current Function)     Turning form your back to your side while in flat bed without using bedrails 4 - None   Moving from lying on your back to sitting on the side of a flat bed without using bedrails 3 - A Little   Moving to and from a bed to a chair 3 - A Little   Standing up from a chair using your arms 3 - A Little   To walk in a hospital room 3 - A Little   Climbing 3-5 steps with a railing 3 - A Little   AM-PAC™ Mobility Score 19      ASSESSMENT AND PLAN     Progress Summary  pt able to walk with rw and s and transfers . she was only able to complete one step then became inconsolable, crying and co of nausea . held further steps. nurse made aware    Patient/Family Therapy Goals Statement: feel better    PT Plan      Flowsheet Row Most Recent Value   Rehab Potential good, to achieve stated therapy goals at 10/11/2024 1321   Therapy Frequency 5 times/wk at 10/11/2024 1321   Planned Therapy Interventions balance training, bed mobility training, gait training, home exercise program, patient/family education, stair training, transfer training at 10/10/2024 0822            PT Discharge Recommendations      Flowsheet Row Most Recent Value   PT Recommended Discharge Disposition home with assistance, home at 10/11/2024 1321   Anticipated Equipment Needs if Discharged Home (PT) none at 10/11/2024 1321            Therapy Durable Medical Equipment  Vendor: [employer]  Equipment Provided: Walker, with Wheels, Adult     PT Goals      Flowsheet Row Most Recent Value   Bed Mobility Goal 1    Activity/Assistive Device bed mobility activities, all at 10/10/2024 0822   [place] modified independence at  10/10/2024 0822   Time Frame 2 days at 10/10/2024 0822   Progress/Outcome goal ongoing at 10/10/2024 0822   Transfer Goal 1    Activity/Assistive Device all transfers at 10/10/2024 0822   [place] modified independence at 10/10/2024 0822   Time Frame 2 days at 10/10/2024 0822   Progress/Outcome goal ongoing at 10/10/2024 0822   Gait Training Goal 1    Activity/Assistive Device gait (walking locomotion) at 10/10/2024 0822   [place] modified independence at 10/10/2024 0822   Time Frame 2 days at 10/10/2024 0822   Progress/Outcome goal ongoing at 10/10/2024 0822   Stairs Goal 1    Activity/Assistive Device stairs, all skills at 10/10/2024 0822   [place] supervision required at 10/10/2024 0822   Number of Stairs 13 at 10/10/2024 0822   Time Frame 2 days at 10/10/2024 0822   Progress/Outcome goal ongoing at 10/10/2024 0822

## 2024-10-17 RX ORDER — BLOOD SUGAR DIAGNOSTIC
STRIP MISCELLANEOUS
Qty: 200 STRIP | Refills: 3 | Status: SHIPPED | OUTPATIENT
Start: 2024-10-17 | End: 2024-11-22

## 2024-10-22 RX ORDER — DEXTROSE 4 G
TABLET,CHEWABLE ORAL
Qty: 1 EACH | Refills: 0 | Status: SHIPPED | OUTPATIENT
Start: 2024-10-22 | End: 2024-10-25 | Stop reason: SDUPTHER

## 2024-10-22 RX ORDER — IBUPROFEN 200 MG
CAPSULE ORAL
Qty: 200 STRIP | Refills: 6 | Status: SHIPPED | OUTPATIENT
Start: 2024-10-22 | End: 2024-10-25 | Stop reason: SDUPTHER

## 2024-10-25 DIAGNOSIS — E11.65 TYPE 2 DIABETES MELLITUS WITH HYPERGLYCEMIA, WITHOUT LONG-TERM CURRENT USE OF INSULIN (CMS/HCC): Primary | ICD-10-CM

## 2024-10-25 RX ORDER — DEXTROSE 4 G
TABLET,CHEWABLE ORAL
Qty: 1 EACH | Refills: 0 | Status: SHIPPED | OUTPATIENT
Start: 2024-10-25

## 2024-10-25 RX ORDER — IBUPROFEN 200 MG
CAPSULE ORAL
Qty: 200 STRIP | Refills: 6 | Status: SHIPPED | OUTPATIENT
Start: 2024-10-25

## 2024-10-28 RX ORDER — LOSARTAN POTASSIUM AND HYDROCHLOROTHIAZIDE 12.5; 5 MG/1; MG/1
1 TABLET ORAL DAILY
Qty: 90 TABLET | Refills: 1 | Status: SHIPPED | OUTPATIENT
Start: 2024-10-28 | End: 2024-12-20

## 2024-10-28 RX ORDER — LOSARTAN POTASSIUM AND HYDROCHLOROTHIAZIDE 12.5; 5 MG/1; MG/1
1 TABLET ORAL DAILY
Qty: 90 TABLET | Refills: 1 | Status: SHIPPED | OUTPATIENT
Start: 2024-10-28

## 2024-11-08 DIAGNOSIS — E11.9 TYPE 2 DIABETES MELLITUS WITHOUT COMPLICATION, WITHOUT LONG-TERM CURRENT USE OF INSULIN (CMS/HCC): Primary | ICD-10-CM

## 2024-11-08 DIAGNOSIS — E55.9 VITAMIN D DEFICIENCY: ICD-10-CM

## 2024-11-15 RX ORDER — VALACYCLOVIR HYDROCHLORIDE 500 MG/1
500 TABLET, FILM COATED ORAL DAILY
Qty: 90 TABLET | Refills: 1 | Status: SHIPPED | OUTPATIENT
Start: 2024-11-15

## 2024-11-22 ENCOUNTER — OFFICE VISIT (OUTPATIENT)
Dept: FAMILY MEDICINE | Facility: CLINIC | Age: 74
End: 2024-11-22
Payer: COMMERCIAL

## 2024-11-22 VITALS
TEMPERATURE: 97.5 F | HEIGHT: 62 IN | DIASTOLIC BLOOD PRESSURE: 70 MMHG | OXYGEN SATURATION: 97 % | SYSTOLIC BLOOD PRESSURE: 132 MMHG | HEART RATE: 94 BPM | WEIGHT: 195 LBS | BODY MASS INDEX: 35.88 KG/M2

## 2024-11-22 DIAGNOSIS — I10 PRIMARY HYPERTENSION: ICD-10-CM

## 2024-11-22 DIAGNOSIS — R53.83 OTHER FATIGUE: Primary | ICD-10-CM

## 2024-11-22 DIAGNOSIS — K21.9 GASTROESOPHAGEAL REFLUX DISEASE WITHOUT ESOPHAGITIS: ICD-10-CM

## 2024-11-22 DIAGNOSIS — E55.9 VITAMIN D DEFICIENCY: ICD-10-CM

## 2024-11-22 DIAGNOSIS — E78.5 HYPERLIPIDEMIA, UNSPECIFIED HYPERLIPIDEMIA TYPE: ICD-10-CM

## 2024-11-22 PROBLEM — R11.0 NAUSEA: Status: ACTIVE | Noted: 2024-11-22

## 2024-11-22 PROCEDURE — 99214 OFFICE O/P EST MOD 30 MIN: CPT | Mod: GC

## 2024-11-22 PROCEDURE — 3008F BODY MASS INDEX DOCD: CPT

## 2024-11-22 PROCEDURE — 3078F DIAST BP <80 MM HG: CPT

## 2024-11-22 PROCEDURE — 3075F SYST BP GE 130 - 139MM HG: CPT

## 2024-11-22 ASSESSMENT — ENCOUNTER SYMPTOMS
VOMITING: 0
DIARRHEA: 0
PALPITATIONS: 0
UNEXPECTED WEIGHT CHANGE: 1
CONSTIPATION: 0
APPETITE CHANGE: 1
NAUSEA: 0

## 2024-11-22 NOTE — ASSESSMENT & PLAN NOTE
Patient complaining of nausea between breakfast and early dinner   Continue omeprazole  Encouraged snack in between meals

## 2024-11-22 NOTE — PATIENT INSTRUCTIONS
Please get the labs done earlier   Please have a follow up with GI   Eat a snack in between breakfast and dinner (protein-rich food) at around 1pm

## 2024-11-22 NOTE — PROGRESS NOTES
[facility]     CHIEF COMPLAINT   Fanny Reddy is a 74 y.o. female who presents to the office for evaluation of pain and fatigue.     HISTORY OF PRESENT ILLNESS      Fatigue  10/9 L Knee arthroplasty   feeling weak and tired for the past 2 weeks  No energy to do home PT   No appetite: has been drinking vitamin water and Ensure   Not experiencing pain except for PT (good pain)- soreness   Has been sleeping very well- not more than normal   Eating a lot of oatmeal with banana, wheaties,   Blood sugars 114, 130s well managed   Labs scheduled 12/14     Wt Readings from Last 3 Encounters:   11/22/24 88.5 kg (195 lb)   10/09/24 92.5 kg (204 lb)   09/18/24 92.7 kg (204 lb 4.8 oz)   Nausea  On going for the past year and only happens in the afternoon   Zofran helps   2-3x week   Peppermint and norris helps   Pickle juice helps   Getting worked up by [facility]   Occasionally has vomiting     PAST MEDICAL AND SURGICAL HISTORY      PMHx:  Patient Active Problem List    Diagnosis Date Noted    Nausea 11/22/2024    Other fatigue 11/22/2024    Preoperative examination 09/18/2024    Primary localized osteoarthritis of left knee 09/18/2024    GERD (gastroesophageal reflux disease)     Meralgia paraesthetica, right     Seasonal allergies     Post-viral cough syndrome 01/11/2023    Other irritable bowel syndrome 11/15/2021    Localized swelling, mass or lump of neck 09/11/2020    Routine general medical examination at a health care facility 07/10/2020    Type 2 diabetes mellitus (CMS/[facility]) 02/21/2020    Class 2 severe obesity due to excess calories with serious comorbidity and body mass index (BMI) of 36.0 to 36.9 in adult (CMS/HCC)     Primary hypertension 01/19/2011    Type 2 diabetes mellitus with hyperglycemia (CMS/HCC) 01/19/2011    Hyperlipidemia 01/19/2011    Vitamin D deficiency 01/19/2011     PSHx:  Past Surgical History   Procedure Laterality Date    Hysterectomy      Left Knee  Arthroplasty Left 10/9/2024    [place] [facility] [place]    [place]omy      UPPER GASTROINTESTINAL ENDOSCOPY WITH BIOPSY N/A 4/10/2024    [place]ams [place] [facility]     Reviewed at today's visit with patient.  MEDICATIONS        Current Outpatient Medications:     aspirin (ASPIR-LOW) 81 mg enteric coated tablet, Take 1 tablet (81 mg total) by mouth daily. Hold medication for 6 weeks while taking Aspirin 325 mg twice a day.  May resume this dose 11/21/2024, Disp: , Rfl:     aspirin 325 mg tablet, Take 1 tablet (325 mg total) by mouth 2 (two) times a day. Take Asprin for 6 weeks. Please obtain over the counter., Disp: , Rfl:     blood sugar diagnostic (glucose blood) strip, Test sugars bid, Disp: 200 strip, Rfl: 6    blood-glucose meter misc, Use to check sugars bid, Disp: 1 each, Rfl: 0    empagliflozin (JARDIANCE) 10 mg tablet, Take 1 tablet (10 mg total) by mouth daily., Disp: 90 tablet, Rfl: 1    JARDIANCE 10 mg tablet, TAKE 1 TABLET BY MOUTH EVERY DAY, Disp: 90 tablet, Rfl: 1    losartan-hydrochlorothiazide (HYZAAR) 50-12.5 mg per tablet, TAKE 1 TABLET BY MOUTH EVERY DAY, Disp: 90 tablet, Rfl: 1    losartan-hydrochlorothiazide (HYZAAR) 50-12.5 mg per tablet, Take 1 tablet by mouth daily., Disp: 90 tablet, Rfl: 1    metFORMIN (GLUCOPHAGE) 1,000 mg tablet, Take 1 tablet (1,000 mg total) by mouth 2 (two) times a day with meals., Disp: 180 tablet, Rfl: 1    omeprazole (PriLOSEC) 40 mg capsule, TAKE 1 CAPSULE (40 MG TOTAL) BY MOUTH DAILY., Disp: 90 capsule, Rfl: 1    ondansetron ODT (ZOFRAN-ODT) 4 mg disintegrating tablet, Take 1 tablet (4 mg total) by mouth every 8 (eight) hours as needed for nausea or vomiting (Nausea/Vomiting) for up to 7 days., Disp: 15 tablet, Rfl: 0    pregabalin (LYRICA) 75 mg capsule, Take 75 mg by mouth 2 (two) times a day., Disp: , Rfl:     rosuvastatin (CRESTOR) 20 mg tablet, Take 1 tablet (20 mg total) by mouth daily., Disp: 90 tablet, Rfl: 0     traZODone (DESYREL) 50 mg tablet, TAKE 1 SPLIT TABLET (25 MG TOTAL) BY MOUTH NIGHTLY., Disp: 90 tablet, Rfl: 1    valACYclovir (VALTREX) 500 mg tablet, Take 1 tablet (500 mg total) by mouth daily., Disp: 90 tablet, Rfl: 1  Reviewed at today's visit with patient.  ALLERGIES      Patient has no known allergies.  Reviewed at today's visit with patient.  FAMILY HISTORY      Family History   Problem Relation Name Age of Onset    Heart disease Biological Mother      Heart disease Biological Father      Emphysema Biological Brother       Reviewed at today's visit with patient.  SOCIAL HISTORY      Social History     Socioeconomic History    Marital status: Single     Spouse name: None    Number of children: None    Years of education: None    Highest education level: None   Tobacco Use    Smoking status: Never    Smokeless tobacco: Never   Substance and Sexual Activity    Alcohol use: Yes     Comment: socially at celebratory events    Drug use: No    Sexual activity: Never   Social History Narrative    Lives alone with a cat.     Social Drivers of Health     Food Insecurity: No Food Insecurity (10/9/2024)    Hunger Vital Sign     Worried About Running Out of Food in the Last Year: Never true     Ran Out of Food in the Last Year: Never true   Transportation Needs: No Transportation Needs (10/10/2024)    PRAPARE - Transportation     Lack of Transportation (Medical): No     Lack of Transportation (Non-Medical): No   Housing Stability: Unknown (10/10/2024)    Housing Stability Vital Sign     Unable to Pay for Housing in the Last Year: No     Homeless in the Last Year: No     Reviewed at today's visit with patient.  REVIEW OF SYSTEMS      .Review of Systems   Constitutional:  Positive for appetite change and unexpected weight change.   Cardiovascular:  Negative for chest pain and palpitations.   Gastrointestinal:  Negative for constipation, diarrhea, nausea and vomiting.     PHYSICAL EXAMINATION      Visit Vitals  /70  "  Pulse 94   Temp 36.4 °C (97.5 °F)   Ht 1.575 m (5' 2\")   Wt 88.5 kg (195 lb)   SpO2 97%   BMI 35.67 kg/m²     Body mass index is 35.67 kg/m².    Physical Exam  Vitals reviewed.   Constitutional:       Appearance: Normal appearance.   Cardiovascular:      Rate and Rhythm: Normal rate and regular rhythm.      Pulses: Normal pulses.      Heart sounds: Normal heart sounds.   Pulmonary:      Effort: Pulmonary effort is normal.      Breath sounds: Normal breath sounds.   Neurological:      Mental Status: She is alert.       LABS / IMAGING / STUDIES      Lab Results   Component Value Date    CREATININE 0.9 10/10/2024     Lab Results   Component Value Date    WBC 5.45 09/18/2024    HGB 13.1 09/18/2024    HCT 43.3 09/18/2024    MCV 80.6 (L) 09/18/2024     09/18/2024     Lab Results   Component Value Date    CHOL 191 06/04/2024    CHOL 167 12/01/2023    CHOL 163 05/18/2023    TRIG 113 06/04/2024    TRIG 115 12/01/2023    TRIG 77 05/18/2023    HDL 64 06/04/2024    HDL 49 12/01/2023    HDL 59 05/18/2023     Lab Results   Component Value Date    HGBA1C 7.7 (H) 09/18/2024     Lab Results   Component Value Date    MICROALBUR 6.9 06/04/2024        Reviewed at today's visit with patient.     ASSESSMENT AND PLAN   Problem List Items Addressed This Visit          Circulatory    Primary hypertension     Continue hyzaar             Digestive    GERD (gastroesophageal reflux disease)     Patient complaining of nausea between breakfast and early dinner   Continue omeprazole  Encouraged snack in between meals              Endocrine/Metabolic    Vitamin D deficiency    Relevant Orders    Vitamin D 25 hydroxy       Other    Hyperlipidemia     Continue crestor          Other fatigue - Primary     Complaining of lack of energy since knee surgery  Likely weight loss is from lack of appetite 2/2 pain prior to surgery     Vitamin D and TSH   Encouraged incorporating more regular activities with friends          Relevant Orders    TSH w " reflex FT4            Zeynep Schultz DO  11/22/24        Some or all of this note has been written using voice recognition software.    Please excuse any typographical errors.

## 2024-11-22 NOTE — ASSESSMENT & PLAN NOTE
Complaining of lack of energy since knee surgery  Likely weight loss is from lack of appetite 2/2 pain prior to surgery     Vitamin D and TSH   Encouraged incorporating more regular activities with friends

## 2024-12-04 LAB
25(OH)D3+25(OH)D2 SERPL-MCNC: 111 NG/ML (ref 30–100)
T4 FREE SERPL-MCNC: 1.17 NG/DL (ref 0.82–1.77)
TSH SERPL DL<=0.005 MIU/L-ACNC: 2.04 UIU/ML (ref 0.45–4.5)

## 2024-12-08 LAB
25(OH)D3+25(OH)D2 SERPL-MCNC: 106 NG/ML (ref 30–100)
ALBUMIN SERPL-MCNC: 4.3 G/DL (ref 3.8–4.8)
ALP SERPL-CCNC: 102 IU/L (ref 44–121)
ALT SERPL-CCNC: 14 IU/L (ref 0–32)
AST SERPL-CCNC: 20 IU/L (ref 0–40)
BASOPHILS # BLD AUTO: 0.1 X10E3/UL (ref 0–0.2)
BASOPHILS NFR BLD AUTO: 1 %
BILIRUB SERPL-MCNC: 0.5 MG/DL (ref 0–1.2)
BUN SERPL-MCNC: 14 MG/DL (ref 8–27)
BUN/CREAT SERPL: 16 (ref 12–28)
CALCIUM SERPL-MCNC: 9.9 MG/DL (ref 8.7–10.3)
CHLORIDE SERPL-SCNC: 100 MMOL/L (ref 96–106)
CHOLEST SERPL-MCNC: 172 MG/DL (ref 100–199)
CO2 SERPL-SCNC: 23 MMOL/L (ref 20–29)
CREAT SERPL-MCNC: 0.89 MG/DL (ref 0.57–1)
EGFRCR SERPLBLD CKD-EPI 2021: 68 ML/MIN/1.73
EOSINOPHIL # BLD AUTO: 0.4 X10E3/UL (ref 0–0.4)
EOSINOPHIL NFR BLD AUTO: 7 %
ERYTHROCYTE [DISTWIDTH] IN BLOOD BY AUTOMATED COUNT: 14.5 % (ref 11.7–15.4)
GLOBULIN SER CALC-MCNC: 2.2 G/DL (ref 1.5–4.5)
GLUCOSE SERPL-MCNC: 126 MG/DL (ref 70–99)
HBA1C MFR BLD: 6.7 % (ref 4.8–5.6)
HCT VFR BLD AUTO: 43.3 % (ref 34–46.6)
HDLC SERPL-MCNC: 47 MG/DL
HGB BLD-MCNC: 13.3 G/DL (ref 11.1–15.9)
IMM GRANULOCYTES # BLD AUTO: 0 X10E3/UL (ref 0–0.1)
IMM GRANULOCYTES NFR BLD AUTO: 0 %
LDLC SERPL CALC-MCNC: 106 MG/DL (ref 0–99)
LYMPHOCYTES # BLD AUTO: 2.2 X10E3/UL (ref 0.7–3.1)
LYMPHOCYTES NFR BLD AUTO: 48 %
MCH RBC QN AUTO: 24.2 PG (ref 26.6–33)
MCHC RBC AUTO-ENTMCNC: 30.7 G/DL (ref 31.5–35.7)
MCV RBC AUTO: 79 FL (ref 79–97)
MONOCYTES # BLD AUTO: 0.4 X10E3/UL (ref 0.1–0.9)
MONOCYTES NFR BLD AUTO: 9 %
NEUTROPHILS # BLD AUTO: 1.7 X10E3/UL (ref 1.4–7)
NEUTROPHILS NFR BLD AUTO: 35 %
PLATELET # BLD AUTO: 294 X10E3/UL (ref 150–450)
POTASSIUM SERPL-SCNC: 4.8 MMOL/L (ref 3.5–5.2)
PROT SERPL-MCNC: 6.5 G/DL (ref 6–8.5)
RBC # BLD AUTO: 5.5 X10E6/UL (ref 3.77–5.28)
SODIUM SERPL-SCNC: 138 MMOL/L (ref 134–144)
TRIGL SERPL-MCNC: 103 MG/DL (ref 0–149)
VLDLC SERPL CALC-MCNC: 19 MG/DL (ref 5–40)
WBC # BLD AUTO: 4.7 X10E3/UL (ref 3.4–10.8)

## 2024-12-09 LAB
ALBUMIN/CREAT UR: 4 MG/G CREAT (ref 0–29)
CREAT UR-MCNC: 83.8 MG/DL
MICROALBUMIN UR-MCNC: 3.5 UG/ML

## 2024-12-20 ENCOUNTER — OFFICE VISIT (OUTPATIENT)
Dept: FAMILY MEDICINE | Facility: CLINIC | Age: 74
End: 2024-12-20
Payer: COMMERCIAL

## 2024-12-20 VITALS
BODY MASS INDEX: 35.7 KG/M2 | HEART RATE: 91 BPM | OXYGEN SATURATION: 98 % | HEIGHT: 62 IN | TEMPERATURE: 97.2 F | WEIGHT: 194 LBS

## 2024-12-20 DIAGNOSIS — I10 PRIMARY HYPERTENSION: ICD-10-CM

## 2024-12-20 DIAGNOSIS — E11.65 TYPE 2 DIABETES MELLITUS WITH HYPERGLYCEMIA, WITHOUT LONG-TERM CURRENT USE OF INSULIN (CMS/HCC): ICD-10-CM

## 2024-12-20 DIAGNOSIS — M25.562 LEFT KNEE PAIN, UNSPECIFIED CHRONICITY: Primary | ICD-10-CM

## 2024-12-20 PROCEDURE — 3008F BODY MASS INDEX DOCD: CPT

## 2024-12-20 PROCEDURE — 99214 OFFICE O/P EST MOD 30 MIN: CPT | Mod: GC

## 2024-12-20 RX ORDER — NAPROXEN 500 MG/1
500 TABLET ORAL 2 TIMES DAILY WITH MEALS
Qty: 60 TABLET | Refills: 1 | Status: SHIPPED | OUTPATIENT
Start: 2024-12-20 | End: 2025-06-18

## 2024-12-20 ASSESSMENT — ENCOUNTER SYMPTOMS
SHORTNESS OF BREATH: 0
ABDOMINAL PAIN: 0
ARTHRALGIAS: 1
ACTIVITY CHANGE: 0
CONSTITUTIONAL NEGATIVE: 1
NEUROLOGICAL NEGATIVE: 1
APPETITE CHANGE: 0
CHEST TIGHTNESS: 0
PALPITATIONS: 0
GASTROINTESTINAL NEGATIVE: 1
PSYCHIATRIC NEGATIVE: 1
ABDOMINAL DISTENTION: 0
RESPIRATORY NEGATIVE: 1
HEMATOLOGIC/LYMPHATIC NEGATIVE: 1
CARDIOVASCULAR NEGATIVE: 1

## 2024-12-20 NOTE — PROGRESS NOTES
"     [facility]     Patient Name: Fanny Reddy  Age: 74 y.o.  Gender: female    REASON FOR VISIT      Evaluation of chronic conditions.    Patient Active Problem List   Diagnosis    Primary hypertension    Type 2 diabetes mellitus with hyperglycemia (CMS/HCC)    Hyperlipidemia    Vitamin D deficiency    Class 2 severe obesity due to excess calories with serious comorbidity and body mass index (BMI) of 36.0 to 36.9 in adult (CMS/HCC)    Type 2 diabetes mellitus (CMS/HCC)    Routine general medical examination at a health care facility    Localized swelling, mass or lump of neck    Other irritable bowel syndrome    Post-viral cough syndrome    GERD (gastroesophageal reflux disease)    Meralgia paraesthetica, right    Seasonal allergies    Preoperative examination    Primary localized osteoarthritis of left knee    Nausea    Other fatigue    Left knee pain       RELEVANT VISIT DATA   BP Readings from Last 3 Encounters:   11/22/24 132/70   10/11/24 138/66   09/18/24 138/70     Wt Readings from Last 3 Encounters:   12/20/24 88 kg (194 lb)   11/22/24 88.5 kg (195 lb)   10/09/24 92.5 kg (204 lb)            ADDITIONAL VISIT INFORMATION   Presents for follow up. No acute concerns.     DM  A1c most recent 6.7 (from 7.7)  Dec sugars       HTN  Managed well     Left Knee arthoplasty 10/9  Going to PT  Appt with Dr (ortho)- not bending as quickly   Wants angle to be 90 degrees, less than that  Inflammation pain after PT         VITALS      Vitals:    12/20/24 0930   Pulse: 91   Temp: 36.2 °C (97.2 °F)   SpO2: 98%   Weight: 88 kg (194 lb)   Height: 1.575 m (5' 2\")     Body mass index is 35.48 kg/m².     PAST MEDICAL AND SURGICAL HISTORY      Past Medical History:   Diagnosis Date    Cutaneous abscess of neck 02/07/2019    GERD (gastroesophageal reflux disease)     Hypertension     Irritable bowel syndrome with diarrhea     Lipid disorder     Meralgia paraesthetica, right     Seasonal allergies     Type 2 " diabetes mellitus (CMS/HCC)        Past Surgical History   Procedure Laterality Date    Hysterectomy      Left Knee Arthroplasty Left 10/9/2024    Performed by Kit Chan DO at [facility] OR    Tonsillectomy      UPPER GASTROINTESTINAL ENDOSCOPY WITH BIOPSY N/A 4/10/2024    Performed by Nathan Willams MD at [facility] GI       MEDICATIONS        Current Outpatient Medications:     naproxen (NAPROSYN) 500 mg tablet, Take 1 tablet (500 mg total) by mouth 2 (two) times a day with meals., Disp: 60 tablet, Rfl: 1    aspirin (ASPIR-LOW) 81 mg enteric coated tablet, Take 1 tablet (81 mg total) by mouth daily. Hold medication for 6 weeks while taking Aspirin 325 mg twice a day.  May resume this dose 11/21/2024, Disp: , Rfl:     aspirin 325 mg tablet, Take 1 tablet (325 mg total) by mouth 2 (two) times a day. Take Asprin for 6 weeks. Please obtain over the counter., Disp: , Rfl:     blood sugar diagnostic (glucose blood) strip, Test sugars bid, Disp: 200 strip, Rfl: 6    blood-glucose meter misc, Use to check sugars bid, Disp: 1 each, Rfl: 0    JARDIANCE 10 mg tablet, TAKE 1 TABLET BY MOUTH EVERY DAY, Disp: 90 tablet, Rfl: 1    losartan-hydrochlorothiazide (HYZAAR) 50-12.5 mg per tablet, TAKE 1 TABLET BY MOUTH EVERY DAY, Disp: 90 tablet, Rfl: 1    metFORMIN (GLUCOPHAGE) 1,000 mg tablet, Take 1 tablet (1,000 mg total) by mouth 2 (two) times a day with meals., Disp: 180 tablet, Rfl: 1    omeprazole (PriLOSEC) 40 mg capsule, TAKE 1 CAPSULE (40 MG TOTAL) BY MOUTH DAILY., Disp: 90 capsule, Rfl: 1    ondansetron ODT (ZOFRAN-ODT) 4 mg disintegrating tablet, Take 1 tablet (4 mg total) by mouth every 8 (eight) hours as needed for nausea or vomiting (Nausea/Vomiting) for up to 7 days., Disp: 15 tablet, Rfl: 0    pregabalin (LYRICA) 75 mg capsule, Take 75 mg by mouth 2 (two) times a day., Disp: , Rfl:     rosuvastatin (CRESTOR) 20 mg tablet, Take 1 tablet (20 mg total) by mouth daily., Disp: 90 tablet, Rfl: 0    traZODone (DESYREL) 50  mg tablet, TAKE 1 SPLIT TABLET (25 MG TOTAL) BY MOUTH NIGHTLY., Disp: 90 tablet, Rfl: 1    valACYclovir (VALTREX) 500 mg tablet, Take 1 tablet (500 mg total) by mouth daily., Disp: 90 tablet, Rfl: 1    ALLERGIES      Patient has no known allergies.    FAMILY HISTORY      Family History   Problem Relation Name Age of Onset    Heart disease Biological Mother      Heart disease Biological Father      Emphysema Biological Brother         SOCIAL HISTORY      Social History     Socioeconomic History    Marital status: Single     Spouse name: None    Number of children: None    Years of education: None    Highest education level: None   Tobacco Use    Smoking status: Never    Smokeless tobacco: Never   Substance and Sexual Activity    Alcohol use: Yes     Comment: socially at [employer] events    Drug use: No    Sexual activity: Never   Social History Narrative    Lives alone with a cat.     Social Drivers of Health     Food Insecurity: No Food Insecurity (10/9/2024)    Hunger Vital Sign     Worried About Running Out of Food in the Last Year: Never true     Ran Out of Food in the Last Year: Never true   Transportation Needs: No Transportation Needs (10/10/2024)    PRAPARE - Transportation     Lack of Transportation (Medical): No     Lack of Transportation (Non-Medical): No   Housing Stability: Unknown (10/10/2024)    Housing Stability Vital Sign     Unable to Pay for Housing in the Last Year: No     Homeless in the Last Year: No       REVIEW OF SYSTEMS      Review of Systems   Constitutional: Negative.  Negative for activity change and appetite change.   HENT: Negative.  Negative for congestion.    Respiratory: Negative.  Negative for chest tightness and shortness of breath.    Cardiovascular: Negative.  Negative for chest pain and palpitations.   Gastrointestinal: Negative.  Negative for abdominal distention and abdominal pain.   Genitourinary: Negative.    Musculoskeletal:  Positive for arthralgias.   Skin: Negative.     Neurological: Negative.    Hematological: Negative.    Psychiatric/Behavioral: Negative.         PHYSICAL EXAMINATION      Physical Exam  Constitutional:       Appearance: Normal appearance.   Cardiovascular:      Rate and Rhythm: Normal rate and regular rhythm.      Pulses: Normal pulses.      Heart sounds: Normal heart sounds.   Pulmonary:      Effort: Pulmonary effort is normal.      Breath sounds: Normal breath sounds.   Abdominal:      General: Bowel sounds are normal.      Palpations: Abdomen is soft.   Skin:     Comments: Decreased Left knee ROM   Neurological:      General: No focal deficit present.      Mental Status: She is alert and oriented to person, place, and time.   Psychiatric:         Mood and Affect: Mood normal.         Behavior: Behavior normal.          ASSESSMENT AND PLAN   Problem List Items Addressed This Visit       Primary hypertension     Stable   Cont med regimen          Type 2 diabetes mellitus with hyperglycemia (CMS/[facility])     Most recent A1c 6.7  Cont current regimen   Has improved diet, dec sugars          Left knee pain - Primary     10/9 left knee arthroplasty   Has been going to PT, pain after PT  Naproxen prn and ice for pain          Relevant Medications    naproxen (NAPROSYN) 500 mg tablet       You are recommended to complete all ordered testing as instructed by your provider. It is important that any prescribed medications be taken as directed. Keep all recommended appointments. Any orders or other recommendations from this visit may be listed below and/or in the After Visit Summary (AVS). Review the Patient Instructions contained in the AVS for any additional information that may have been included for this visit.     Contact the office with any healthcare concerns. If you are experiencing a medical emergency proceed to the nearest emergency department or dial 911 for medical assistance.     Jorge Melgar DO  12/20/24  10:19 AM

## 2025-01-06 RX ORDER — ROSUVASTATIN CALCIUM 20 MG/1
20 TABLET, COATED ORAL DAILY
Qty: 90 TABLET | Refills: 0 | Status: SHIPPED | OUTPATIENT
Start: 2025-01-06

## 2025-01-16 ENCOUNTER — APPOINTMENT (OUTPATIENT)
Dept: URBAN - METROPOLITAN AREA CLINIC 28 | Facility: CLINIC | Age: 75
Setting detail: DERMATOLOGY
End: 2025-01-16

## 2025-01-16 DIAGNOSIS — L60.8 OTHER NAIL DISORDERS: ICD-10-CM

## 2025-01-16 DIAGNOSIS — L30.1 DYSHIDROSIS [POMPHOLYX]: ICD-10-CM

## 2025-01-16 DIAGNOSIS — K13.0 DISEASES OF LIPS: ICD-10-CM

## 2025-01-16 PROCEDURE — ? COUNSELING

## 2025-01-16 PROCEDURE — ? PRESCRIPTION

## 2025-01-16 PROCEDURE — ? PRESCRIPTION MEDICATION MANAGEMENT

## 2025-01-16 PROCEDURE — ? OTC TREATMENT REGIMEN

## 2025-01-16 PROCEDURE — 99214 OFFICE O/P EST MOD 30 MIN: CPT

## 2025-01-16 RX ORDER — CRISABOROLE 20 MG/G
OINTMENT TOPICAL QDAY
Qty: 60 | Refills: 2 | Status: ERX

## 2025-01-16 RX ORDER — CLOBETASOL PROPIONATE 0.5 MG/G
CREAM TOPICAL BID
Qty: 60 | Refills: 2 | Status: ERX | COMMUNITY
Start: 2025-01-16

## 2025-01-16 RX ADMIN — CLOBETASOL PROPIONATE: 0.5 CREAM TOPICAL at 00:00

## 2025-01-16 ASSESSMENT — LOCATION SIMPLE DESCRIPTION DERM
LOCATION SIMPLE: LEFT INDEX FINGERNAIL
LOCATION SIMPLE: LEFT LIP
LOCATION SIMPLE: LEFT HAND
LOCATION SIMPLE: RIGHT HAND
LOCATION SIMPLE: RIGHT INDEX FINGERNAIL

## 2025-01-16 ASSESSMENT — LOCATION DETAILED DESCRIPTION DERM
LOCATION DETAILED: RIGHT ULNAR PALM
LOCATION DETAILED: LEFT INFERIOR VERMILION LIP
LOCATION DETAILED: LEFT RADIAL PALM
LOCATION DETAILED: RIGHT INDEX FINGERNAIL
LOCATION DETAILED: LEFT INDEX FINGERNAIL

## 2025-01-16 ASSESSMENT — LOCATION ZONE DERM
LOCATION ZONE: FINGERNAIL
LOCATION ZONE: HAND
LOCATION ZONE: LIP

## 2025-01-16 NOTE — HPI: DISCOLORATION
How Severe Is Your Skin Discoloration?: moderate
Additional History: Used Aquaphor, Vaseline, and francis bees

## 2025-01-16 NOTE — PROCEDURE: PRESCRIPTION MEDICATION MANAGEMENT
Initiate Treatment: (Restart)Eucrisa 2% topical ointment: Apply QDAY to the eczema of the hands as maintenance\\n(Restart)clobetasol 0.05% topical ointment: Apply a thin layer to aa of hands BID PRN flare\\nCutemol
Samples Given: Cutemol cream
Detail Level: Zone
Render In Strict Bullet Format?: No

## 2025-01-21 ENCOUNTER — RX ONLY (RX ONLY)
Age: 75
End: 2025-01-21

## 2025-01-21 RX ORDER — TRIAMCINOLONE ACETONIDE 1 MG/G
CREAM TOPICAL BID X 2 WEEKS
Qty: 80 | Refills: 2 | Status: ERX | COMMUNITY
Start: 2025-01-21

## 2025-02-28 RX ORDER — OMEPRAZOLE 40 MG/1
40 CAPSULE, DELAYED RELEASE ORAL DAILY
Qty: 90 CAPSULE | Refills: 1 | Status: SHIPPED | OUTPATIENT
Start: 2025-02-28

## 2025-03-13 RX ORDER — METFORMIN HYDROCHLORIDE 1000 MG/1
1000 TABLET ORAL
Qty: 180 TABLET | Refills: 1 | Status: SHIPPED | OUTPATIENT
Start: 2025-03-13

## 2025-04-03 RX ORDER — ROSUVASTATIN CALCIUM 20 MG/1
20 TABLET, COATED ORAL DAILY
Qty: 90 TABLET | Refills: 0 | Status: SHIPPED | OUTPATIENT
Start: 2025-04-03

## 2025-04-11 ENCOUNTER — HOSPITAL ENCOUNTER (OUTPATIENT)
Facility: HOSPITAL | Age: 75
Setting detail: HOSPITAL OUTPATIENT SURGERY
End: 2025-04-11
Attending: ORTHOPAEDIC SURGERY | Admitting: ORTHOPAEDIC SURGERY
Payer: COMMERCIAL

## 2025-05-01 ENCOUNTER — APPOINTMENT (OUTPATIENT)
Dept: LAB | Facility: HOSPITAL | Age: 75
End: 2025-05-01
Attending: ORTHOPAEDIC SURGERY
Payer: COMMERCIAL

## 2025-05-01 ENCOUNTER — TRANSCRIBE ORDERS (OUTPATIENT)
Dept: LAB | Facility: HOSPITAL | Age: 75
End: 2025-05-01

## 2025-05-01 ENCOUNTER — PRE-ADMISSION TESTING (OUTPATIENT)
Dept: PREADMISSION TESTING | Facility: HOSPITAL | Age: 75
End: 2025-05-01
Attending: ORTHOPAEDIC SURGERY
Payer: COMMERCIAL

## 2025-05-01 VITALS
BODY MASS INDEX: 38.18 KG/M2 | TEMPERATURE: 97.9 F | OXYGEN SATURATION: 99 % | HEIGHT: 62 IN | WEIGHT: 207.5 LBS | HEART RATE: 72 BPM | DIASTOLIC BLOOD PRESSURE: 70 MMHG | SYSTOLIC BLOOD PRESSURE: 149 MMHG | RESPIRATION RATE: 20 BRPM

## 2025-05-01 DIAGNOSIS — M24.662 ANKYLOSIS OF LEFT KNEE: Primary | ICD-10-CM

## 2025-05-01 DIAGNOSIS — M17.12 UNILATERAL PRIMARY OSTEOARTHRITIS, LEFT KNEE: ICD-10-CM

## 2025-05-01 DIAGNOSIS — Z01.818 PREOP EXAMINATION: Primary | ICD-10-CM

## 2025-05-01 DIAGNOSIS — M24.662 ANKYLOSIS OF LEFT KNEE: ICD-10-CM

## 2025-05-01 LAB
ANION GAP SERPL CALC-SCNC: 7 MEQ/L (ref 3–15)
ATRIAL RATE: 65
BUN SERPL-MCNC: 16 MG/DL (ref 7–25)
CALCIUM SERPL-MCNC: 9.9 MG/DL (ref 8.6–10.3)
CHLORIDE SERPL-SCNC: 102 MEQ/L (ref 98–107)
CO2 SERPL-SCNC: 30 MEQ/L (ref 21–31)
CREAT SERPL-MCNC: 0.9 MG/DL (ref 0.6–1.2)
EGFRCR SERPLBLD CKD-EPI 2021: >60 ML/MIN/1.73M*2
ERYTHROCYTE [DISTWIDTH] IN BLOOD BY AUTOMATED COUNT: 15 % (ref 11.7–14.4)
GLUCOSE SERPL-MCNC: 137 MG/DL (ref 70–99)
HCT VFR BLD AUTO: 44 % (ref 35–45)
HGB BLD-MCNC: 13.7 G/DL (ref 11.8–15.7)
MCH RBC QN AUTO: 24.7 PG (ref 28–33.2)
MCHC RBC AUTO-ENTMCNC: 31.1 G/DL (ref 32.2–35.5)
MCV RBC AUTO: 79.3 FL (ref 83–98)
P AXIS: 47
PLATELET # BLD AUTO: 281 K/UL (ref 150–369)
PMV BLD AUTO: 9.7 FL (ref 9.4–12.3)
POTASSIUM SERPL-SCNC: 4.8 MEQ/L (ref 3.5–5.1)
PR INTERVAL: 186
QRS DURATION: 86
QT INTERVAL: 406
QTC CALCULATION(BAZETT): 422
R AXIS: 0
RBC # BLD AUTO: 5.55 M/UL (ref 3.93–5.22)
SODIUM SERPL-SCNC: 139 MEQ/L (ref 136–145)
T WAVE AXIS: 47
VENTRICULAR RATE: 65
WBC # BLD AUTO: 4.72 K/UL (ref 3.8–10.5)

## 2025-05-01 PROCEDURE — 80048 BASIC METABOLIC PNL TOTAL CA: CPT

## 2025-05-01 PROCEDURE — 85027 COMPLETE CBC AUTOMATED: CPT

## 2025-05-01 PROCEDURE — 36415 COLL VENOUS BLD VENIPUNCTURE: CPT

## 2025-05-01 RX ORDER — LATANOPROST 50 UG/ML
1 SOLUTION/ DROPS OPHTHALMIC NIGHTLY
COMMUNITY
Start: 2025-04-29

## 2025-05-01 RX ORDER — CYCLOSPORINE 0.5 MG/ML
1 EMULSION OPHTHALMIC AS NEEDED
COMMUNITY

## 2025-05-01 RX ORDER — LANCETS 33 GAUGE
EACH MISCELLANEOUS 2 TIMES DAILY
COMMUNITY
Start: 2025-04-14

## 2025-05-01 RX ORDER — MELOXICAM 7.5 MG/1
7.5 TABLET ORAL 2 TIMES DAILY
COMMUNITY
Start: 2025-04-07

## 2025-05-09 RX ORDER — SODIUM CHLORIDE 9 MG/ML
INJECTION, SOLUTION INTRAVENOUS CONTINUOUS
OUTPATIENT
Start: 2025-05-09 | End: 2025-05-10

## 2025-06-20 DIAGNOSIS — E55.9 VITAMIN D DEFICIENCY: ICD-10-CM

## 2025-06-20 DIAGNOSIS — E11.65 TYPE 2 DIABETES MELLITUS WITH HYPERGLYCEMIA, WITHOUT LONG-TERM CURRENT USE OF INSULIN (CMS/HCC): ICD-10-CM

## 2025-06-20 DIAGNOSIS — E66.812 CLASS 2 SEVERE OBESITY DUE TO EXCESS CALORIES WITH SERIOUS COMORBIDITY AND BODY MASS INDEX (BMI) OF 36.0 TO 36.9 IN ADULT (CMS/HCC): ICD-10-CM

## 2025-06-20 DIAGNOSIS — E66.01 CLASS 2 SEVERE OBESITY DUE TO EXCESS CALORIES WITH SERIOUS COMORBIDITY AND BODY MASS INDEX (BMI) OF 36.0 TO 36.9 IN ADULT (CMS/HCC): ICD-10-CM

## 2025-06-20 DIAGNOSIS — I10 PRIMARY HYPERTENSION: Primary | ICD-10-CM

## 2025-06-20 DIAGNOSIS — E78.5 HYPERLIPIDEMIA, UNSPECIFIED HYPERLIPIDEMIA TYPE: ICD-10-CM

## 2025-07-01 RX ORDER — ROSUVASTATIN CALCIUM 20 MG/1
20 TABLET, COATED ORAL NIGHTLY
Qty: 90 TABLET | Refills: 1 | Status: SHIPPED | OUTPATIENT
Start: 2025-07-01

## 2025-07-10 LAB
ALBUMIN SERPL-MCNC: 4.5 G/DL (ref 3.8–4.8)
ALP SERPL-CCNC: 98 IU/L (ref 44–121)
ALT SERPL-CCNC: 18 IU/L (ref 0–32)
AST SERPL-CCNC: 22 IU/L (ref 0–40)
BASOPHILS # BLD AUTO: 0.1 X10E3/UL (ref 0–0.2)
BASOPHILS NFR BLD AUTO: 1 %
BILIRUB SERPL-MCNC: 0.5 MG/DL (ref 0–1.2)
BUN SERPL-MCNC: 15 MG/DL (ref 8–27)
BUN/CREAT SERPL: 15 (ref 12–28)
CALCIUM SERPL-MCNC: 9.5 MG/DL (ref 8.7–10.3)
CHLORIDE SERPL-SCNC: 102 MMOL/L (ref 96–106)
CHOLEST SERPL-MCNC: 168 MG/DL (ref 100–199)
CO2 SERPL-SCNC: 21 MMOL/L (ref 20–29)
CREAT SERPL-MCNC: 1 MG/DL (ref 0.57–1)
EGFRCR SERPLBLD CKD-EPI 2021: 59 ML/MIN/1.73
EOSINOPHIL # BLD AUTO: 0.2 X10E3/UL (ref 0–0.4)
EOSINOPHIL NFR BLD AUTO: 5 %
ERYTHROCYTE [DISTWIDTH] IN BLOOD BY AUTOMATED COUNT: 14.2 % (ref 11.7–15.4)
GLOBULIN SER CALC-MCNC: 2.2 G/DL (ref 1.5–4.5)
GLUCOSE SERPL-MCNC: 149 MG/DL (ref 70–99)
HBA1C MFR BLD: 8.2 % (ref 4.8–5.6)
HCT VFR BLD AUTO: 44.2 % (ref 34–46.6)
HDLC SERPL-MCNC: 49 MG/DL
HGB BLD-MCNC: 13.9 G/DL (ref 11.1–15.9)
IMM GRANULOCYTES # BLD AUTO: 0 X10E3/UL (ref 0–0.1)
IMM GRANULOCYTES NFR BLD AUTO: 0 %
LDLC SERPL CALC-MCNC: 98 MG/DL (ref 0–99)
LYMPHOCYTES # BLD AUTO: 1.8 X10E3/UL (ref 0.7–3.1)
LYMPHOCYTES NFR BLD AUTO: 40 %
MCH RBC QN AUTO: 24.6 PG (ref 26.6–33)
MCHC RBC AUTO-ENTMCNC: 31.4 G/DL (ref 31.5–35.7)
MCV RBC AUTO: 78 FL (ref 79–97)
MONOCYTES # BLD AUTO: 0.4 X10E3/UL (ref 0.1–0.9)
MONOCYTES NFR BLD AUTO: 9 %
NEUTROPHILS # BLD AUTO: 2.1 X10E3/UL (ref 1.4–7)
NEUTROPHILS NFR BLD AUTO: 45 %
PLATELET # BLD AUTO: 263 X10E3/UL (ref 150–450)
POTASSIUM SERPL-SCNC: 4.4 MMOL/L (ref 3.5–5.2)
PROT SERPL-MCNC: 6.7 G/DL (ref 6–8.5)
RBC # BLD AUTO: 5.64 X10E6/UL (ref 3.77–5.28)
SODIUM SERPL-SCNC: 141 MMOL/L (ref 134–144)
TRIGL SERPL-MCNC: 120 MG/DL (ref 0–149)
VLDLC SERPL CALC-MCNC: 21 MG/DL (ref 5–40)
WBC # BLD AUTO: 4.6 X10E3/UL (ref 3.4–10.8)

## 2025-07-11 LAB
25(OH)D3+25(OH)D2 SERPL-MCNC: 101 NG/ML (ref 30–100)
ALBUMIN/CREAT UR: 4 MG/G CREAT (ref 0–29)
CREAT UR-MCNC: 99.1 MG/DL
MICROALBUMIN UR-MCNC: 4.1 UG/ML
T4 FREE SERPL-MCNC: 1.11 NG/DL (ref 0.82–1.77)
TSH SERPL DL<=0.005 MIU/L-ACNC: 3.21 UIU/ML (ref 0.45–4.5)

## 2025-07-15 ENCOUNTER — OFFICE VISIT (OUTPATIENT)
Facility: CLINIC | Age: 75
End: 2025-07-15
Payer: COMMERCIAL

## 2025-07-15 VITALS
OXYGEN SATURATION: 97 % | TEMPERATURE: 97.5 F | WEIGHT: 207 LBS | BODY MASS INDEX: 38.09 KG/M2 | SYSTOLIC BLOOD PRESSURE: 128 MMHG | HEIGHT: 62 IN | DIASTOLIC BLOOD PRESSURE: 82 MMHG | HEART RATE: 87 BPM

## 2025-07-15 DIAGNOSIS — K58.0 IRRITABLE BOWEL SYNDROME WITH DIARRHEA: ICD-10-CM

## 2025-07-15 DIAGNOSIS — E66.01 CLASS 2 SEVERE OBESITY DUE TO EXCESS CALORIES WITH SERIOUS COMORBIDITY AND BODY MASS INDEX (BMI) OF 36.0 TO 36.9 IN ADULT (CMS/HCC): ICD-10-CM

## 2025-07-15 DIAGNOSIS — E66.812 CLASS 2 SEVERE OBESITY DUE TO EXCESS CALORIES WITH SERIOUS COMORBIDITY AND BODY MASS INDEX (BMI) OF 36.0 TO 36.9 IN ADULT (CMS/HCC): ICD-10-CM

## 2025-07-15 DIAGNOSIS — I10 PRIMARY HYPERTENSION: ICD-10-CM

## 2025-07-15 DIAGNOSIS — E78.5 HYPERLIPIDEMIA, UNSPECIFIED HYPERLIPIDEMIA TYPE: ICD-10-CM

## 2025-07-15 DIAGNOSIS — E11.65 TYPE 2 DIABETES MELLITUS WITH HYPERGLYCEMIA, WITHOUT LONG-TERM CURRENT USE OF INSULIN (CMS/HCC): ICD-10-CM

## 2025-07-15 DIAGNOSIS — Z12.31 BREAST CANCER SCREENING BY MAMMOGRAM: ICD-10-CM

## 2025-07-15 DIAGNOSIS — Z00.00 ENCOUNTER FOR GENERAL ADULT MEDICAL EXAMINATION WITHOUT ABNORMAL FINDINGS: Primary | ICD-10-CM

## 2025-07-15 PROCEDURE — 3079F DIAST BP 80-89 MM HG: CPT | Performed by: FAMILY MEDICINE

## 2025-07-15 PROCEDURE — 3074F SYST BP LT 130 MM HG: CPT | Performed by: FAMILY MEDICINE

## 2025-07-15 PROCEDURE — 3008F BODY MASS INDEX DOCD: CPT | Performed by: FAMILY MEDICINE

## 2025-07-15 PROCEDURE — 99397 PER PM REEVAL EST PAT 65+ YR: CPT | Performed by: FAMILY MEDICINE

## 2025-07-15 ASSESSMENT — ENCOUNTER SYMPTOMS
FREQUENCY: 0
VOMITING: 0
CONSTIPATION: 0
ABDOMINAL PAIN: 0
FEVER: 0
BACK PAIN: 0
SORE THROAT: 0
DIZZINESS: 0
COUGH: 0
DIARRHEA: 0
ACTIVITY CHANGE: 0
ARTHRALGIAS: 0
APPETITE CHANGE: 0
NUMBNESS: 0
WHEEZING: 0
NERVOUS/ANXIOUS: 0
SHORTNESS OF BREATH: 0
TROUBLE SWALLOWING: 0
FATIGUE: 0
EYE PAIN: 0
HEMATURIA: 0
NAUSEA: 0

## 2025-07-15 NOTE — PROGRESS NOTES
Subjective      Patient ID: Fanny Gilbert is a 75 y.o. female.      HPI  Patient presents today for physical exam.      Social History     Socioeconomic History    Marital status:      Spouse name: Not on file    Number of children: 1    Years of education: Not on file    Highest education level: Not on file   Occupational History    Occupation: Retired   Tobacco Use    Smoking status: Never     Passive exposure: Past    Smokeless tobacco: Never   Substance and Sexual Activity    Alcohol use: Yes     Comment: socially at celebratory events    Drug use: No    Sexual activity: Never   Other Topics Concern    Not on file   Social History Narrative    Lives alone with a cat.in  a duplex, occupies first floor     Social Drivers of Health     Financial Resource Strain: Not on file   Food Insecurity: No Food Insecurity (10/9/2024)    Hunger Vital Sign     Worried About Running Out of Food in the Last Year: Never true     Ran Out of Food in the Last Year: Never true   Transportation Needs: No Transportation Needs (10/10/2024)    PRAPARE - Transportation     Lack of Transportation (Medical): No     Lack of Transportation (Non-Medical): No   Physical Activity: Not on file   Stress: Not on file   Social Connections: Not on file   Intimate Partner Violence: Not on file   Housing Stability: Unknown (10/10/2024)    Housing Stability Vital Sign     Unable to Pay for Housing in the Last Year: No     Number of Times Moved in the Last Year: Not on file     Homeless in the Last Year: No       Family History   Problem Relation Name Age of Onset    Heart disease Biological Mother      Heart disease Biological Father      Emphysema Biological Brother         Past Surgical History   Procedure Laterality Date    Colonoscopy      Cystectomy Right     upper back    Eye surgery Right     cataract    Hysterectomy      Knee arthroplasty Left 10/09/2024    Left Knee Arthroplasty Left 10/9/2024    Performed by Kit Chan DO at  "[facility] OR    Tonsillectomy      Upper gastrointestinal endoscopy  04/10/2024    UPPER GASTROINTESTINAL ENDOSCOPY WITH BIOPSY N/A 4/10/2024    Performed by Nathan Willams MD at [facility]       Past Medical History:   Diagnosis Date    Cutaneous abscess of neck 02/07/2019    GERD (gastroesophageal reflux disease)     Hypertension     Irritable bowel syndrome with diarrhea     Lipid disorder     Meralgia paraesthetica, right     Seasonal allergies     Type 2 diabetes mellitus (CMS/[facility])         Goals    None         The following have been reviewed and updated as appropriate in this visit:   Allergies  Meds  Problems       Review of Systems   Constitutional:  Negative for activity change, appetite change, fatigue and fever.   HENT:  Negative for congestion, ear pain, postnasal drip, sore throat and trouble swallowing.    Eyes:  Negative for pain.   Respiratory:  Negative for cough, shortness of breath and wheezing.    Cardiovascular:  Negative for chest pain.   Gastrointestinal:  Negative for abdominal pain, constipation, diarrhea, nausea and vomiting.   Genitourinary:  Negative for frequency and hematuria.   Musculoskeletal:  Negative for arthralgias and back pain.   Skin:  Negative for rash.   Neurological:  Negative for dizziness and numbness.   Psychiatric/Behavioral:  The patient is not nervous/anxious.        Objective     Vitals:    07/15/25 1040   BP: 128/82   Pulse: 87   Temp: 36.4 °C (97.5 °F)   SpO2: 97%   Weight: 93.9 kg (207 lb)   Height: 1.575 m (5' 2\")     Body mass index is 37.86 kg/m².    Physical Exam  Constitutional:       Appearance: Normal appearance. She is well-developed. She is obese.   HENT:      Head: Normocephalic and atraumatic.   Neck:      Thyroid: No thyromegaly.   Cardiovascular:      Rate and Rhythm: Normal rate and regular rhythm.      Heart sounds: Normal heart sounds. No murmur heard.     No gallop.   Pulmonary:      Effort: Pulmonary effort is normal. No respiratory distress. "      Breath sounds: Normal breath sounds. No wheezing.   Musculoskeletal:      Right lower leg: No edema.      Left lower leg: No edema.   Skin:     General: Skin is warm and dry.      Findings: No erythema or rash.   Neurological:      General: No focal deficit present.      Mental Status: She is alert and oriented to person, place, and time. Mental status is at baseline.   Psychiatric:         Mood and Affect: Mood normal.         Behavior: Behavior normal.         Thought Content: Thought content normal.         Judgment: Judgment normal.         Assessment/Plan   Problem List Items Addressed This Visit          Circulatory    Primary hypertension    Stable, cont current regimen  Counseled low salt/sodium diet and increased cardiovascular exercise              Endocrine/Metabolic    Class 2 severe obesity due to excess calories with serious comorbidity and body mass index (BMI) of 36.0 to 36.9 in adult (CMS/[facility])    Encouraged DASH/ADA diet with caloric appropriate portioning and limiting salt intake to < 2000mg/day. Encouraged regular aerobic exercise for at least 120-150 mins/week for cardioprotective benefits. Discussed goal BMI < 30 for morbidity/mortality benefits.             Relevant Orders    Ambulatory referral to [facility]    Type 2 diabetes mellitus (CMS/[facility])    Lab Results   Component Value Date    HGBA1C 8.2 (H) 07/10/2025     Cont current regimen  Pt wants to work with nutrition to see if that can help bring A1c to <8%  With her IBS-D her sx are best controlled with carbs - discussed quantity and quality of carb intake  If no improvement in 3 months will increase jardiance to 25mg daily  Utd [facility] - dr dupont  Cont statin and arb         Relevant Orders    Ambulatory referral to [facility]    Hemoglobin A1c    Comprehensive metabolic panel       Other    Hyperlipidemia    Lab Results   Component Value Date    CHOL 168 07/10/2025    CHOL 172 12/07/2024    CHOL 191 06/04/2024     Lab  "Results   Component Value Date    HDL 49 07/10/2025    HDL 47 12/07/2024    HDL 64 06/04/2024     Lab Results   Component Value Date    LDLCALC 98 07/10/2025    LDLCALC 106 (H) 12/07/2024    LDLCALC 107 (H) 06/04/2024     Lab Results   Component Value Date    TRIG 120 07/10/2025    TRIG 103 12/07/2024    TRIG 113 06/04/2024     No results found for: \"CHOLHDL\"  Cont statin            Other Visit Diagnoses         Encounter for general adult medical examination without abnormal findings    -  Primary    counseled diet/nutrition and exercise      Irritable bowel syndrome with diarrhea        Relevant Orders    Ambulatory referral to [facility]      Breast cancer screening by mammogram        Relevant Orders    BI SCREENING MAMMOGRAM BILATERAL(TOMOSYNTHESIS)          Mammo - rx provided - scheduled for next week  Ophtho - has appt with Dr Hernandez  [facility]cp - has living will and POA at home - will upload via portal  Advance care plan discussed and documented in the medical record  Colonsocopy - utd (due 2027)    "

## 2025-07-15 NOTE — ASSESSMENT & PLAN NOTE
"Lab Results   Component Value Date    CHOL 168 07/10/2025    CHOL 172 12/07/2024    CHOL 191 06/04/2024     Lab Results   Component Value Date    HDL 49 07/10/2025    HDL 47 12/07/2024    HDL 64 06/04/2024     Lab Results   Component Value Date    LDLCALC 98 07/10/2025    LDLCALC 106 (H) 12/07/2024    LDLCALC 107 (H) 06/04/2024     Lab Results   Component Value Date    TRIG 120 07/10/2025    TRIG 103 12/07/2024    TRIG 113 06/04/2024     No results found for: \"CHOLHDL\"  Cont statin    "

## 2025-07-15 NOTE — PATIENT INSTRUCTIONS
Dr Hernandez - if you can help find out who he is recommending to follow with upon his longterm    Send me a copy via the portal of your living will/POA

## 2025-07-15 NOTE — ASSESSMENT & PLAN NOTE
Lab Results   Component Value Date    HGBA1C 8.2 (H) 07/10/2025     Cont current regimen  Pt wants to work with nutrition to see if that can help bring A1c to <8%  With her IBS-D her sx are best controlled with carbs - discussed quantity and quality of carb intake  If no improvement in 3 months will increase jardiance to 25mg daily  Utd ophtho - dr dupont  Cont statin and arb

## 2025-07-23 ENCOUNTER — HOSPITAL ENCOUNTER (OUTPATIENT)
Dept: RADIOLOGY | Facility: HOSPITAL | Age: 75
Discharge: HOME | End: 2025-07-23
Attending: FAMILY MEDICINE
Payer: COMMERCIAL

## 2025-07-23 DIAGNOSIS — Z12.31 BREAST CANCER SCREENING BY MAMMOGRAM: ICD-10-CM

## 2025-07-23 PROCEDURE — 77067 SCR MAMMO BI INCL CAD: CPT

## 2025-07-30 RX ORDER — LOSARTAN POTASSIUM AND HYDROCHLOROTHIAZIDE 12.5; 5 MG/1; MG/1
1 TABLET ORAL DAILY
Qty: 90 TABLET | Refills: 1 | Status: SHIPPED
Start: 2025-07-30

## 2025-07-30 RX ORDER — OMEPRAZOLE 40 MG/1
40 CAPSULE, DELAYED RELEASE ORAL DAILY
Qty: 90 CAPSULE | Refills: 1 | Status: SHIPPED
Start: 2025-07-30

## 2025-07-30 RX ORDER — LOSARTAN POTASSIUM AND HYDROCHLOROTHIAZIDE 12.5; 5 MG/1; MG/1
1 TABLET ORAL DAILY
Qty: 90 TABLET | Refills: 1 | Status: SHIPPED | OUTPATIENT
Start: 2025-07-30

## 2025-07-31 DIAGNOSIS — E11.65 TYPE 2 DIABETES MELLITUS WITH HYPERGLYCEMIA, WITHOUT LONG-TERM CURRENT USE OF INSULIN (CMS/HCC): ICD-10-CM

## 2025-07-31 RX ORDER — IBUPROFEN 200 MG
CAPSULE ORAL
Qty: 200 STRIP | Refills: 6 | Status: SHIPPED | OUTPATIENT
Start: 2025-07-31

## 2025-08-01 RX ORDER — VALACYCLOVIR HYDROCHLORIDE 500 MG/1
500 TABLET, FILM COATED ORAL DAILY
Qty: 90 TABLET | Refills: 1 | Status: SHIPPED | OUTPATIENT
Start: 2025-08-01

## 2025-08-11 ENCOUNTER — NURSE TRIAGE (OUTPATIENT)
Facility: CLINIC | Age: 75
End: 2025-08-11
Payer: COMMERCIAL

## 2025-08-12 ENCOUNTER — OFFICE VISIT (OUTPATIENT)
Facility: CLINIC | Age: 75
End: 2025-08-12
Payer: COMMERCIAL

## 2025-08-12 VITALS
OXYGEN SATURATION: 96 % | BODY MASS INDEX: 37.13 KG/M2 | DIASTOLIC BLOOD PRESSURE: 78 MMHG | HEART RATE: 79 BPM | SYSTOLIC BLOOD PRESSURE: 156 MMHG | WEIGHT: 203 LBS | TEMPERATURE: 97.3 F

## 2025-08-12 DIAGNOSIS — H61.22 IMPACTED CERUMEN, LEFT EAR: ICD-10-CM

## 2025-08-12 DIAGNOSIS — R51.9 SINUS HEADACHE: ICD-10-CM

## 2025-08-12 DIAGNOSIS — R42 DIZZINESS: Primary | ICD-10-CM

## 2025-08-12 DIAGNOSIS — E11.65 TYPE 2 DIABETES MELLITUS WITH HYPERGLYCEMIA, WITHOUT LONG-TERM CURRENT USE OF INSULIN (CMS/HCC): ICD-10-CM

## 2025-08-12 LAB
EXPIRATION DATE: NORMAL
GLUCOSE BLOOD, POC: 171
Lab: NORMAL
POCT MANUFACTURER: NORMAL

## 2025-08-12 PROCEDURE — 99214 OFFICE O/P EST MOD 30 MIN: CPT | Mod: 25 | Performed by: FAMILY MEDICINE

## 2025-08-12 PROCEDURE — 3077F SYST BP >= 140 MM HG: CPT | Performed by: FAMILY MEDICINE

## 2025-08-12 PROCEDURE — 82962 GLUCOSE BLOOD TEST: CPT | Performed by: FAMILY MEDICINE

## 2025-08-12 PROCEDURE — 3008F BODY MASS INDEX DOCD: CPT | Performed by: FAMILY MEDICINE

## 2025-08-12 PROCEDURE — 3078F DIAST BP <80 MM HG: CPT | Performed by: FAMILY MEDICINE

## (undated) DEVICE — FORCEP COLD RADIAL JAW

## (undated) DEVICE — GLOVE SZ 8 LINER PROTEXIS PI BL

## (undated) DEVICE — MANIFOLD FOUR PORT NEPTUNE

## (undated) DEVICE — ADHESIVE SKIN DERMABOND ADVANCED 0.7ML

## (undated) DEVICE — GLOVE SURG PROTEXIS PF 7.5

## (undated) DEVICE — PAD GROUND ELECTROSURGICAL W/CORD

## (undated) DEVICE — SOLN IRRIG .9%SOD 3L

## (undated) DEVICE — DRESSING AQUACEL AG 14IN

## (undated) DEVICE — NEEDLE SPINAL 18G X3-1-2IN

## (undated) DEVICE — HOOD T7 PLUS

## (undated) DEVICE — WRAP COBAN LATEX FREE 4IN STERILE

## (undated) DEVICE — APPLICATOR CHLORAPREP 26ML ORANGE TINT

## (undated) DEVICE — MOUTHPIECE 60FR ENDO BITEBLOCK

## (undated) DEVICE — BLADE SAGITTAL DUAL CUT 4118-127-90

## (undated) DEVICE — GOWN SIRUS FABRNF RAGLAN XL ST 28/CS

## (undated) DEVICE — CUFF TOURNIQUET DISP 34 X 4

## (undated) DEVICE — STIRRUP STRAP DISPOSABLE

## (undated) DEVICE — PACK RFID TOTAL KNEE

## (undated) DEVICE — COVER LIGHTHANDLE (STERILE SINGLE PA

## (undated) DEVICE — MIX-EVAC HIGH VACUUM KIT

## (undated) DEVICE — SOLN IRRIG .9%SOD 1000ML

## (undated) DEVICE — SUTURE QUILL 2 PDO RX-2066Q

## (undated) DEVICE — DRAPE-U-1015

## (undated) DEVICE — TUBE SUCTION 1/4INX20FT STERILE

## (undated) DEVICE — SUTURE STRATAFIX PGA 3-0 FS-1 CUTTING 30CM

## (undated) DEVICE — TIP SUCTION YANKAUER

## (undated) DEVICE — BLADE PATELLA REAMER SZ 46

## (undated) DEVICE — SOLN IV 0.9% NSS 1000ML

## (undated) DEVICE — HOOD T7 PLUS W/PEEL AWAY SHIELD

## (undated) DEVICE — BLADE RECIPROCATING 1MM

## (undated) DEVICE — SUTURE POLYSORB 2-0 UNDYED 1X30 GS-11

## (undated) DEVICE — TOTAL KNEE PERSONA FEMALE HEX SCREW 2.5MM